# Patient Record
Sex: FEMALE | Race: WHITE | NOT HISPANIC OR LATINO
[De-identification: names, ages, dates, MRNs, and addresses within clinical notes are randomized per-mention and may not be internally consistent; named-entity substitution may affect disease eponyms.]

---

## 2021-07-08 ENCOUNTER — RX RENEWAL (OUTPATIENT)
Age: 76
End: 2021-07-08

## 2021-09-05 ENCOUNTER — RX RENEWAL (OUTPATIENT)
Age: 76
End: 2021-09-05

## 2021-12-09 ENCOUNTER — RX RENEWAL (OUTPATIENT)
Age: 76
End: 2021-12-09

## 2022-02-14 ENCOUNTER — RX RENEWAL (OUTPATIENT)
Age: 77
End: 2022-02-14

## 2022-04-18 ENCOUNTER — RX RENEWAL (OUTPATIENT)
Age: 77
End: 2022-04-18

## 2022-04-22 ENCOUNTER — RX RENEWAL (OUTPATIENT)
Age: 77
End: 2022-04-22

## 2022-04-27 ENCOUNTER — RX RENEWAL (OUTPATIENT)
Age: 77
End: 2022-04-27

## 2022-05-19 ENCOUNTER — RX RENEWAL (OUTPATIENT)
Age: 77
End: 2022-05-19

## 2022-09-15 ENCOUNTER — NON-APPOINTMENT (OUTPATIENT)
Age: 77
End: 2022-09-15

## 2022-09-15 ENCOUNTER — LABORATORY RESULT (OUTPATIENT)
Age: 77
End: 2022-09-15

## 2022-09-15 ENCOUNTER — APPOINTMENT (OUTPATIENT)
Dept: INTERNAL MEDICINE | Facility: CLINIC | Age: 77
End: 2022-09-15

## 2022-09-15 VITALS
OXYGEN SATURATION: 95 % | SYSTOLIC BLOOD PRESSURE: 150 MMHG | HEART RATE: 71 BPM | BODY MASS INDEX: 18.95 KG/M2 | WEIGHT: 111 LBS | DIASTOLIC BLOOD PRESSURE: 80 MMHG | TEMPERATURE: 97.5 F | HEIGHT: 64 IN | RESPIRATION RATE: 17 BRPM

## 2022-09-15 DIAGNOSIS — Z00.00 ENCOUNTER FOR GENERAL ADULT MEDICAL EXAMINATION W/OUT ABNORMAL FINDINGS: ICD-10-CM

## 2022-09-15 PROCEDURE — G0439: CPT

## 2022-09-15 PROCEDURE — 93000 ELECTROCARDIOGRAM COMPLETE: CPT

## 2022-09-15 PROCEDURE — 36415 COLL VENOUS BLD VENIPUNCTURE: CPT

## 2022-09-15 PROCEDURE — 99214 OFFICE O/P EST MOD 30 MIN: CPT | Mod: 25

## 2022-09-15 RX ORDER — ALBUTEROL SULFATE 90 UG/1
108 (90 BASE) INHALANT RESPIRATORY (INHALATION)
Qty: 1 | Refills: 3 | Status: ACTIVE | COMMUNITY
Start: 2022-09-15 | End: 1900-01-01

## 2022-09-16 ENCOUNTER — TRANSCRIPTION ENCOUNTER (OUTPATIENT)
Age: 77
End: 2022-09-16

## 2022-09-16 LAB
25(OH)D3 SERPL-MCNC: 92.5 NG/ML
ALBUMIN SERPL ELPH-MCNC: 4.3 G/DL
ALP BLD-CCNC: 119 U/L
ALT SERPL-CCNC: 27 U/L
ANION GAP SERPL CALC-SCNC: 10 MMOL/L
APPEARANCE: CLEAR
AST SERPL-CCNC: 36 U/L
BACTERIA: NEGATIVE
BASOPHILS # BLD AUTO: 2.96 K/UL
BASOPHILS NFR BLD AUTO: 8.1 %
BILIRUB SERPL-MCNC: 0.6 MG/DL
BILIRUBIN URINE: NEGATIVE
BLOOD URINE: NEGATIVE
BUN SERPL-MCNC: 17 MG/DL
CALCIUM SERPL-MCNC: 11.1 MG/DL
CHLORIDE SERPL-SCNC: 102 MMOL/L
CHOLEST SERPL-MCNC: 223 MG/DL
CO2 SERPL-SCNC: 33 MMOL/L
COLOR: NORMAL
CREAT SERPL-MCNC: 0.57 MG/DL
EGFR: 95 ML/MIN/1.73M2
EOSINOPHIL # BLD AUTO: 1.2 K/UL
EOSINOPHIL NFR BLD AUTO: 3.3 %
ESTIMATED AVERAGE GLUCOSE: 123 MG/DL
GLUCOSE QUALITATIVE U: NEGATIVE
GLUCOSE SERPL-MCNC: 91 MG/DL
HBA1C MFR BLD HPLC: 5.9 %
HBV SURFACE AG SER QL: NONREACTIVE
HCT VFR BLD CALC: 49.7 %
HCV AB SER QL: NONREACTIVE
HCV S/CO RATIO: 0.09 S/CO
HDLC SERPL-MCNC: 76 MG/DL
HGB BLD-MCNC: 14.9 G/DL
HYALINE CASTS: 0 /LPF
KETONES URINE: NEGATIVE
LDLC SERPL CALC-MCNC: 123 MG/DL
LEUKOCYTE ESTERASE URINE: NEGATIVE
LYMPHOCYTES # BLD AUTO: 4.75 K/UL
LYMPHOCYTES NFR BLD AUTO: 13 %
MAN DIFF?: NORMAL
MCHC RBC-ENTMCNC: 27.9 PG
MCHC RBC-ENTMCNC: 30 GM/DL
MCV RBC AUTO: 92.9 FL
MICROSCOPIC-UA: NORMAL
MONOCYTES # BLD AUTO: 2.08 K/UL
MONOCYTES NFR BLD AUTO: 5.7 %
NEUTROPHILS # BLD AUTO: 21.69 K/UL
NEUTROPHILS NFR BLD AUTO: 59.4 %
NITRITE URINE: NEGATIVE
NONHDLC SERPL-MCNC: 148 MG/DL
PH URINE: 7.5
PLATELET # BLD AUTO: 750 K/UL
POTASSIUM SERPL-SCNC: 4.8 MMOL/L
PROT SERPL-MCNC: 6.7 G/DL
PROTEIN URINE: NEGATIVE
RBC # BLD: 5.35 M/UL
RBC # FLD: 16.5 %
RED BLOOD CELLS URINE: 1 /HPF
SODIUM SERPL-SCNC: 146 MMOL/L
SPECIFIC GRAVITY URINE: 1.01
SQUAMOUS EPITHELIAL CELLS: 0 /HPF
T3 SERPL-MCNC: 133 NG/DL
T4 FREE SERPL-MCNC: 1.2 NG/DL
TRIGL SERPL-MCNC: 123 MG/DL
TSH SERPL-ACNC: 2.11 UIU/ML
UROBILINOGEN URINE: NORMAL
VIT B12 SERPL-MCNC: >2000 PG/ML
WBC # FLD AUTO: 36.51 K/UL
WHITE BLOOD CELLS URINE: 0 /HPF

## 2022-09-18 LAB — BACTERIA UR CULT: NORMAL

## 2022-09-18 NOTE — HISTORY OF PRESENT ILLNESS
[FreeTextEntry1] : monika [de-identified] : pfizer x4\par pneum x2\par copd- not on inhalers. has wixela at home . not smoking\par will see dr Trevizo- cardiol.\par Otherwise feels good. Appet, sleep, bms, voiding, mood all ok. no pain.\par

## 2022-09-18 NOTE — HEALTH RISK ASSESSMENT
[Good] : ~his/her~  mood as  good [Former] : Former [20 or more] : 20 or more [No] : In the past 12 months have you used drugs other than those required for medical reasons? No [No falls in past year] : Patient reported no falls in the past year [0] : 2) Feeling down, depressed, or hopeless: Not at all (0) [PHQ-2 Negative - No further assessment needed] : PHQ-2 Negative - No further assessment needed [None] : None [Alone] : lives alone [Retired] : retired [Single] : single [Feels Safe at Home] : Feels safe at home [Fully functional (bathing, dressing, toileting, transferring, walking, feeding)] : Fully functional (bathing, dressing, toileting, transferring, walking, feeding) [Fully functional (using the telephone, shopping, preparing meals, housekeeping, doing laundry, using] : Fully functional and needs no help or supervision to perform IADLs (using the telephone, shopping, preparing meals, housekeeping, doing laundry, using transportation, managing medications and managing finances) [Audit-CScore] : 0 [de-identified] : active [de-identified] : healthy [[id number]] : 0 [Patient declined mammogram] : Patient declined mammogram [Patient declined PAP Smear] : Patient declined PAP Smear [Patient declined bone density test] : Patient declined bone density test [Patient declined colonoscopy] : Patient declined colonoscopy [Change in mental status noted] : No change in mental status noted [Language] : denies difficulty with language [Behavior] : denies difficulty with behavior [Handling Complex Tasks] : denies difficulty handling complex tasks [Reasoning] : denies difficulty with reasoning [Reports changes in hearing] : Reports no changes in hearing [Reports changes in vision] : Reports no changes in vision [Reports changes in dental health] : Reports no changes in dental health [MammogramDate] : yrs [PapSmearDate] : yrs [BoneDensityDate] : yrs [ColonoscopyDate] : 2016 [ColonoscopyComments] : cologuard [AdvancecareDate] : 9/15/22

## 2022-09-18 NOTE — ASSESSMENT
[FreeTextEntry1] : COPD- quit tob 2022, alb/wixela prn\par Hoarse- ent eval\par HTN- stable, cont amlo, atenolol, valsartan hct\par HLD- d/e, chk labs\par Discussed healthy lifestyle, updated vaccinations, healthy diet, exercise, chk labs, discussed appropriate screening tests including colonoscopy, mammo, bone density and pap.\par Discussed the importance of screening for colon cancer. Reviewed screening reccomendations including colonoscopy, Cologuard and Fit DNA testing. I strongly encouraged colonoscopy as that is the best screening test to detect both colon cancer and polyps and is the gold standard.\par Discussed the importance and benefit of a healthy lifestyle including a heart healthy diet such as the Mediterranean diet and regular exercise to try to lose weight as well as 7 hours of sleep nightly and minimization of stress where possible as a way of decreasing heart risk and improving health\par \par

## 2022-09-21 ENCOUNTER — OUTPATIENT (OUTPATIENT)
Dept: OUTPATIENT SERVICES | Facility: HOSPITAL | Age: 77
LOS: 1 days | Discharge: HOME | End: 2022-09-21

## 2022-09-21 ENCOUNTER — TRANSCRIPTION ENCOUNTER (OUTPATIENT)
Age: 77
End: 2022-09-21

## 2022-09-21 DIAGNOSIS — Z01.812 ENCOUNTER FOR PREPROCEDURAL LABORATORY EXAMINATION: ICD-10-CM

## 2022-09-22 ENCOUNTER — RESULT REVIEW (OUTPATIENT)
Age: 77
End: 2022-09-22

## 2022-09-22 ENCOUNTER — TRANSCRIPTION ENCOUNTER (OUTPATIENT)
Age: 77
End: 2022-09-22

## 2022-09-22 ENCOUNTER — APPOINTMENT (OUTPATIENT)
Dept: HEMATOLOGY ONCOLOGY | Facility: CLINIC | Age: 77
End: 2022-09-22

## 2022-09-22 ENCOUNTER — OUTPATIENT (OUTPATIENT)
Dept: OUTPATIENT SERVICES | Facility: HOSPITAL | Age: 77
LOS: 1 days | End: 2022-09-22
Payer: MEDICARE

## 2022-09-22 ENCOUNTER — NON-APPOINTMENT (OUTPATIENT)
Age: 77
End: 2022-09-22

## 2022-09-22 ENCOUNTER — APPOINTMENT (OUTPATIENT)
Dept: RADIOLOGY | Facility: IMAGING CENTER | Age: 77
End: 2022-09-22

## 2022-09-22 ENCOUNTER — LABORATORY RESULT (OUTPATIENT)
Age: 77
End: 2022-09-22

## 2022-09-22 VITALS
WEIGHT: 113.98 LBS | HEIGHT: 62.99 IN | DIASTOLIC BLOOD PRESSURE: 68 MMHG | OXYGEN SATURATION: 97 % | SYSTOLIC BLOOD PRESSURE: 161 MMHG | TEMPERATURE: 98.2 F | RESPIRATION RATE: 16 BRPM | BODY MASS INDEX: 20.2 KG/M2 | HEART RATE: 68 BPM

## 2022-09-22 DIAGNOSIS — Z00.8 ENCOUNTER FOR OTHER GENERAL EXAMINATION: ICD-10-CM

## 2022-09-22 DIAGNOSIS — Z01.812 ENCOUNTER FOR PREPROCEDURAL LABORATORY EXAMINATION: ICD-10-CM

## 2022-09-22 DIAGNOSIS — R05.9 COUGH, UNSPECIFIED: ICD-10-CM

## 2022-09-22 LAB
BASOPHILS # BLD AUTO: 0.66 K/UL — HIGH (ref 0–0.2)
BASOPHILS NFR BLD AUTO: 2 % — SIGNIFICANT CHANGE UP (ref 0–2)
EOSINOPHIL # BLD AUTO: 0.66 K/UL — HIGH (ref 0–0.5)
EOSINOPHIL NFR BLD AUTO: 2 % — SIGNIFICANT CHANGE UP (ref 0–6)
HCT VFR BLD CALC: 44.1 % — SIGNIFICANT CHANGE UP (ref 34.5–45)
HGB BLD-MCNC: 13.9 G/DL — SIGNIFICANT CHANGE UP (ref 11.5–15.5)
LYMPHOCYTES # BLD AUTO: 10 % — LOW (ref 13–44)
LYMPHOCYTES # BLD AUTO: 3.31 K/UL — HIGH (ref 1–3.3)
MCHC RBC-ENTMCNC: 27.7 PG — SIGNIFICANT CHANGE UP (ref 27–34)
MCHC RBC-ENTMCNC: 31.5 G/DL — LOW (ref 32–36)
MCV RBC AUTO: 87.8 FL — SIGNIFICANT CHANGE UP (ref 80–100)
METAMYELOCYTES # FLD: 4 % — HIGH (ref 0–0)
MONOCYTES # BLD AUTO: 1.33 K/UL — HIGH (ref 0–0.9)
MONOCYTES NFR BLD AUTO: 4 % — SIGNIFICANT CHANGE UP (ref 2–14)
MYELOCYTES NFR BLD: 11 % — HIGH (ref 0–0)
NEUTROPHILS # BLD AUTO: 22.2 K/UL — HIGH (ref 1.8–7.4)
NEUTROPHILS NFR BLD AUTO: 67 % — SIGNIFICANT CHANGE UP (ref 43–77)
NRBC # BLD: 0 /100 — SIGNIFICANT CHANGE UP (ref 0–0)
NRBC # BLD: SIGNIFICANT CHANGE UP /100 WBCS (ref 0–0)
PLAT MORPH BLD: NORMAL — SIGNIFICANT CHANGE UP
PLATELET # BLD AUTO: 799 K/UL — HIGH (ref 150–400)
RBC # BLD: 5.02 M/UL — SIGNIFICANT CHANGE UP (ref 3.8–5.2)
RBC # FLD: 15.9 % — HIGH (ref 10.3–14.5)
RBC BLD AUTO: SIGNIFICANT CHANGE UP
RETICS #: 108.9 K/UL — SIGNIFICANT CHANGE UP (ref 25–125)
RETICS/RBC NFR: 2.2 % — SIGNIFICANT CHANGE UP (ref 0.5–2.5)
WBC # BLD: 33.13 K/UL — HIGH (ref 3.8–10.5)
WBC # FLD AUTO: 33.13 K/UL — HIGH (ref 3.8–10.5)

## 2022-09-22 PROCEDURE — 71046 X-RAY EXAM CHEST 2 VIEWS: CPT | Mod: 26

## 2022-09-22 PROCEDURE — 99215 OFFICE O/P EST HI 40 MIN: CPT

## 2022-09-22 PROCEDURE — 86900 BLOOD TYPING SEROLOGIC ABO: CPT

## 2022-09-22 PROCEDURE — 86901 BLOOD TYPING SEROLOGIC RH(D): CPT

## 2022-09-22 PROCEDURE — 71046 X-RAY EXAM CHEST 2 VIEWS: CPT

## 2022-09-22 PROCEDURE — 86850 RBC ANTIBODY SCREEN: CPT

## 2022-09-23 ENCOUNTER — NON-APPOINTMENT (OUTPATIENT)
Age: 77
End: 2022-09-23

## 2022-09-26 NOTE — HISTORY OF PRESENT ILLNESS
[de-identified] : This is a 76 year old woman, hx of smoking and alcohol use, COPD.  Patient had a checkup in May 2021.  [place] [facility] [place]. Was said to have normal Bloodwork.   She did have aa HCT of 52k/ul but normal WBC and diff platelets normal\par \par Now WBC 3.6 with immature granulocytes metamyelocytes, no blasts basophils 81% Plates 750 Hg preserved at 14.9 now. \par \par Fatigue, sweats at nights though patient denies this Babita daughter who is a nurse noted this for months.  Quit smoking 2 months ago. \par Did lose weight over the past year.  \par GI tract is well, normal bowel movements.   Appetite adequate.\par \par Daughter Ariana diagnosed in February with breast caner, patient stopped drinking following this.

## 2022-09-26 NOTE — ASSESSMENT
[FreeTextEntry1] : This is a 76 year old woman, history alcohol use, ex smoker, presents for the evaluation of a critically high WBC count 36,000 with promyelocytes and myelocytes in peripheral peripheral blood. Platelet cont 730, underlying defect from 3/2021 was a polycythemia HCT 52%.  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  Can also be myelofibrosis or CMML.  Repeat CBC, aforementioned leukocytosis would have patient called back for bone marrow biopsy for complete diagnosis. Explained to patient and daughter Trica that this is a readily treatable though incurable chronic leukemia. that responds well to oral tyrosine kinases inhibitors. While this is serious it is not an immanently life threatening findings. Explained to patient  that there is a very very small probability that the CBC is a lab error from label swapping.  \par \par \par Given smoking history, dry cough and weight loss,  no recent imaging, also recommend a baseline Chest Xray.  \par \par Peripheral smear reviewed 9/23/22\par RBC normocytic normochromic normal morphology. Platelets elevated in number, normal granulations and morphology, no clumping.  WBC show some immature cells promyelocytes metamyelocytes and myelocytes 7%, 8% basophils 2% monocytes, 18% lymphocytes, 65% mature segmented neutrophils.  \par \par CBC still shows 790 platelets and 33k/ul WBC, confirmed MPN on peripheral smear, WIll have patient  called back re: bone marrow biopsy.  \par \par Addendum 9/26/22\par Chest Xray\par \par Workup for leukocytosis has not returned, but Friday's Plain Xray PA/Lat demonstrated a 3-4 CM right hilar mass. Will have patient go for CT scan IV contrast then see Thoracic surgery for evaluation.  Explained to patient's daughter Babita that the Bone marrow is still necessary, as we would need to clear patient for surgery once the CT scan is done, but the lung tumor is now the more immanent medical priority.

## 2022-09-27 ENCOUNTER — NON-APPOINTMENT (OUTPATIENT)
Age: 77
End: 2022-09-27

## 2022-09-28 ENCOUNTER — APPOINTMENT (OUTPATIENT)
Dept: CT IMAGING | Facility: IMAGING CENTER | Age: 77
End: 2022-09-28

## 2022-09-28 ENCOUNTER — OUTPATIENT (OUTPATIENT)
Dept: OUTPATIENT SERVICES | Facility: HOSPITAL | Age: 77
LOS: 1 days | End: 2022-09-28
Payer: MEDICARE

## 2022-09-28 DIAGNOSIS — R91.8 OTHER NONSPECIFIC ABNORMAL FINDING OF LUNG FIELD: ICD-10-CM

## 2022-09-28 PROCEDURE — 71260 CT THORAX DX C+: CPT | Mod: 26

## 2022-09-28 PROCEDURE — 71260 CT THORAX DX C+: CPT

## 2022-09-29 ENCOUNTER — APPOINTMENT (OUTPATIENT)
Dept: HEMATOLOGY ONCOLOGY | Facility: CLINIC | Age: 77
End: 2022-09-29

## 2022-09-29 ENCOUNTER — LABORATORY RESULT (OUTPATIENT)
Age: 77
End: 2022-09-29

## 2022-09-29 ENCOUNTER — RESULT REVIEW (OUTPATIENT)
Age: 77
End: 2022-09-29

## 2022-09-29 VITALS
RESPIRATION RATE: 16 BRPM | DIASTOLIC BLOOD PRESSURE: 64 MMHG | TEMPERATURE: 98.1 F | HEART RATE: 61 BPM | BODY MASS INDEX: 15.94 KG/M2 | OXYGEN SATURATION: 94 % | SYSTOLIC BLOOD PRESSURE: 132 MMHG | WEIGHT: 89.95 LBS

## 2022-09-29 LAB
BASOPHILS # BLD AUTO: 0.85 K/UL — HIGH (ref 0–0.2)
BASOPHILS NFR BLD AUTO: 3 % — HIGH (ref 0–2)
EOSINOPHIL # BLD AUTO: 0.85 K/UL — HIGH (ref 0–0.5)
EOSINOPHIL NFR BLD AUTO: 3 % — SIGNIFICANT CHANGE UP (ref 0–6)
HCT VFR BLD CALC: 41.6 % — SIGNIFICANT CHANGE UP (ref 34.5–45)
HGB BLD-MCNC: 13.7 G/DL — SIGNIFICANT CHANGE UP (ref 11.5–15.5)
LYMPHOCYTES # BLD AUTO: 1.99 K/UL — SIGNIFICANT CHANGE UP (ref 1–3.3)
LYMPHOCYTES # BLD AUTO: 7 % — LOW (ref 13–44)
MCHC RBC-ENTMCNC: 28.1 PG — SIGNIFICANT CHANGE UP (ref 27–34)
MCHC RBC-ENTMCNC: 32.9 G/DL — SIGNIFICANT CHANGE UP (ref 32–36)
MCV RBC AUTO: 85.2 FL — SIGNIFICANT CHANGE UP (ref 80–100)
METAMYELOCYTES # FLD: 4 % — HIGH (ref 0–0)
MONOCYTES # BLD AUTO: 0.28 K/UL — SIGNIFICANT CHANGE UP (ref 0–0.9)
MONOCYTES NFR BLD AUTO: 1 % — LOW (ref 2–14)
MYELOCYTES NFR BLD: 14 % — HIGH (ref 0–0)
NEUTROPHILS # BLD AUTO: 19.35 K/UL — HIGH (ref 1.8–7.4)
NEUTROPHILS NFR BLD AUTO: 68 % — SIGNIFICANT CHANGE UP (ref 43–77)
NRBC # BLD: 0 /100 — SIGNIFICANT CHANGE UP (ref 0–0)
NRBC # BLD: SIGNIFICANT CHANGE UP /100 WBCS (ref 0–0)
PLAT MORPH BLD: NORMAL — SIGNIFICANT CHANGE UP
PLATELET # BLD AUTO: 742 K/UL — HIGH (ref 150–400)
RBC # BLD: 4.88 M/UL — SIGNIFICANT CHANGE UP (ref 3.8–5.2)
RBC # FLD: 15.7 % — HIGH (ref 10.3–14.5)
RBC BLD AUTO: SIGNIFICANT CHANGE UP
WBC # BLD: 28.46 K/UL — HIGH (ref 3.8–10.5)
WBC # FLD AUTO: 28.46 K/UL — HIGH (ref 3.8–10.5)

## 2022-09-29 PROCEDURE — 38222 DX BONE MARROW BX & ASPIR: CPT

## 2022-09-29 NOTE — PROCEDURE
[Bone Marrow Biopsy] : bone marrow biopsy [Bone Marrow Aspiration] : bone marrow aspiration  [Patient] : the patient [Verbal Consent Obtained] : verbal consent was obtained prior to the procedure [Patient identification verified] : patient identification verified [Procedure verified and consent obtained] : procedure verified and consent obtained [Laterality verified and correct site marked] : laterality verified and correct site marked [Right] : site: right [Correct positioning] : correct positioning [Prone] : prone [Superior iliac spine was identified] : the superior iliac spine was identified. [The right posterior iliac crest was prepped with betadine and draped, using sterile technique.] : The right posterior iliac crest was prepped with betadine and draped, using sterile technique. [Lidocaine was injected and into the periosteum overlying the site.] : Lidocaine was injected and into the periosteum overlying the site. [Aspirate] : aspirate [Cytogenetics] : cytogenetics [FISH] : FISH [Biopsy] : biopsy [Flow Cytometry] : flow cytometry [] : The patient was instructed to remove the bandage the following AM. The patient may bathe. Acetaminophen may be taken for discomfort, as per package directions.If there are any other problems, the patient was instructed to call the office. The patient verbalized understanding, and is aware of the office contact numbers. [FreeTextEntry1] : leukocytosis/ thrombocytosis [FreeTextEntry2] : 6 cc of lidocaine was used for the procedure. \par \par WBC: 28.46\par Hgb: 13.7\par Hct: 41.6\par Plts: 742\par \par Bone marrow aspiration and biopsy were done. MPN and CML panel requested.

## 2022-09-29 NOTE — REASON FOR VISIT
[Bone Marrow Biopsy] : bone marrow biopsy [Bone Marrow Aspiration] : bone marrow aspiration [FreeTextEntry2] : leukocytosis/ thrombocytosis

## 2022-10-03 ENCOUNTER — APPOINTMENT (OUTPATIENT)
Dept: HEMATOLOGY ONCOLOGY | Facility: CLINIC | Age: 77
End: 2022-10-03

## 2022-10-03 ENCOUNTER — APPOINTMENT (OUTPATIENT)
Dept: THORACIC SURGERY | Facility: CLINIC | Age: 77
End: 2022-10-03

## 2022-10-03 ENCOUNTER — RESULT REVIEW (OUTPATIENT)
Age: 77
End: 2022-10-03

## 2022-10-03 ENCOUNTER — NON-APPOINTMENT (OUTPATIENT)
Age: 77
End: 2022-10-03

## 2022-10-03 VITALS
BODY MASS INDEX: 20.61 KG/M2 | WEIGHT: 112 LBS | OXYGEN SATURATION: 93 % | SYSTOLIC BLOOD PRESSURE: 136 MMHG | DIASTOLIC BLOOD PRESSURE: 74 MMHG | HEIGHT: 62 IN | HEART RATE: 59 BPM

## 2022-10-03 DIAGNOSIS — Z78.9 OTHER SPECIFIED HEALTH STATUS: ICD-10-CM

## 2022-10-03 DIAGNOSIS — Z77.22 CONTACT WITH AND (SUSPECTED) EXPOSURE TO ENVIRONMENTAL TOBACCO SMOKE (ACUTE) (CHRONIC): ICD-10-CM

## 2022-10-03 DIAGNOSIS — Z87.891 PERSONAL HISTORY OF NICOTINE DEPENDENCE: ICD-10-CM

## 2022-10-03 LAB
BASOPHILS # BLD AUTO: 0.72 K/UL — HIGH (ref 0–0.2)
BASOPHILS NFR BLD AUTO: 2 % — SIGNIFICANT CHANGE UP (ref 0–2)
EOSINOPHIL # BLD AUTO: 0.36 K/UL — SIGNIFICANT CHANGE UP (ref 0–0.5)
EOSINOPHIL NFR BLD AUTO: 1 % — SIGNIFICANT CHANGE UP (ref 0–6)
HCT VFR BLD CALC: 41.2 % — SIGNIFICANT CHANGE UP (ref 34.5–45)
HGB BLD-MCNC: 13.4 G/DL — SIGNIFICANT CHANGE UP (ref 11.5–15.5)
LYMPHOCYTES # BLD AUTO: 13 % — SIGNIFICANT CHANGE UP (ref 13–44)
LYMPHOCYTES # BLD AUTO: 4.68 K/UL — HIGH (ref 1–3.3)
MCHC RBC-ENTMCNC: 28 PG — SIGNIFICANT CHANGE UP (ref 27–34)
MCHC RBC-ENTMCNC: 32.5 G/DL — SIGNIFICANT CHANGE UP (ref 32–36)
MCV RBC AUTO: 86 FL — SIGNIFICANT CHANGE UP (ref 80–100)
METAMYELOCYTES # FLD: 5 % — HIGH (ref 0–0)
MONOCYTES # BLD AUTO: 0 K/UL — SIGNIFICANT CHANGE UP (ref 0–0.9)
MONOCYTES NFR BLD AUTO: 0 % — LOW (ref 2–14)
MYELOCYTES NFR BLD: 10 % — HIGH (ref 0–0)
NEUTROPHILS # BLD AUTO: 24.84 K/UL — HIGH (ref 1.8–7.4)
NEUTROPHILS NFR BLD AUTO: 69 % — SIGNIFICANT CHANGE UP (ref 43–77)
NRBC # BLD: 0 /100 — SIGNIFICANT CHANGE UP (ref 0–0)
NRBC # BLD: SIGNIFICANT CHANGE UP /100 WBCS (ref 0–0)
PLAT MORPH BLD: NORMAL — SIGNIFICANT CHANGE UP
PLATELET # BLD AUTO: 870 K/UL — HIGH (ref 150–400)
RBC # BLD: 4.79 M/UL — SIGNIFICANT CHANGE UP (ref 3.8–5.2)
RBC # FLD: 15.8 % — HIGH (ref 10.3–14.5)
RBC BLD AUTO: SIGNIFICANT CHANGE UP
WBC # BLD: 36 K/UL — HIGH (ref 3.8–10.5)
WBC # FLD AUTO: 36 K/UL — HIGH (ref 3.8–10.5)

## 2022-10-03 PROCEDURE — 99205 OFFICE O/P NEW HI 60 MIN: CPT

## 2022-10-03 NOTE — DATA REVIEWED
[FreeTextEntry1] : I have independently reviewed the following:\par CXR on 9/22/22\par CT Chest w/IV contrast on 9/28/22

## 2022-10-03 NOTE — HISTORY OF PRESENT ILLNESS
[FreeTextEntry1] : Ms. KALA VALLEJO, 76 year old female, former smoker, w/ hx of HTN, COPD, leukocytosis, who presented to PCP for routine physical check, was found to have elevated WBC. Pt was then sent to Hem/Onc Dr. Courtney Pandya for further workup. Now s/p bone marrow biopsy, pending result. Pt was also instructed to do a CXR, which showed a lung mass incidentally. \par \par CXR on 9/22/22:\par - 3 and 4 cm Rt hilar masses concerning for neoplasm\par \par CT Chest w/IV contrast on 9/28/22:\par - emphysema\par - mild biapical scarring\par - tree-in-bud opacities with anterior RUL, likely impacted airways\par - 6.4 cm partially calcified  Rt hilar mass within RUL/superior segment of RLL\par - pancreatic duct and CBD are mildly dilated\par \par Pt presents today for CT Sx consultation, referred by Dr. Courtney Pandya. \par \par \par \par

## 2022-10-03 NOTE — ASSESSMENT
[FreeTextEntry1] : Ms. KALA VALLEJO, 76 year old female, former smoker, w/ hx of HTN, COPD, leukocytosis, who presented to PCP for routine physical check, was found to have elevated WBC. Pt was then sent to Hem/Onc Dr. Courtney Pandya for further workup. Now s/p bone marrow biopsy, pending result. Pt was also instructed to do a CXR, which showed a lung mass incidentally. \par \par CXR on 9/22/22:\par - 3 and 4 cm Rt hilar masses concerning for neoplasm\par \par CT Chest w/IV contrast on 9/28/22:\par - emphysema\par - mild biapical scarring\par - tree-in-bud opacities with anterior RUL, likely impacted airways\par - 6.4 cm partially calcified  Rt hilar mass within RUL/superior segment of RLL\par - pancreatic duct and CBD are mildly dilated\par \par I have reviewed the patient's medical records and diagnostic images at time of this office consultation and have made the following recommendation:\par 1. CT reviewed with pt, would like to order a PET/CT for further evaluation of the lung mass. \par 2. BW for Quantiferon TB, Fungitell, Aspergillus, and ACE\par 3. RTC after all above tests\par \par \par I personally performed the services described in the documentation, reviewed the documentation recorded by the scribe in my presence and it accurately and completely records my words and actions. \par \par I, Dannielle Will NP, am scribing for and the presence of TRACEY Stanley, the following sections HISTORY OF PRESENT ILLNESS, PAST MEDICAL/FAMILY/SOCIAL HISTORY; REVIEW OF SYSTEMS; VITAL SIGNS; PHYSICAL EXAM; DISPOSITION.\par

## 2022-10-03 NOTE — CONSULT LETTER
[Dear  ___] : Dear  [unfilled], [Consult Letter:] : I had the pleasure of evaluating your patient, [unfilled]. [( Thank you for referring [unfilled] for consultation for _____ )] : Thank you for referring [unfilled] for consultation for [unfilled] [Please see my note below.] : Please see my note below. [Consult Closing:] : Thank you very much for allowing me to participate in the care of this patient.  If you have any questions, please do not hesitate to contact me. [Sincerely,] : Sincerely, [FreeTextEntry2] : Dr. Courtney Pandya (ref/Hem/Onc) [FreeTextEntry3] : Meaghan Cortes MD\par Attending Surgeon\par Division of Thoracic Surgery\par , [employer] at [facility]\par

## 2022-10-04 LAB
ACE BLD-CCNC: 109 U/L
M TB IFN-G BLD-IMP: NEGATIVE
QUANTIFERON TB PLUS MITOGEN MINUS NIL: 2.49 IU/ML
QUANTIFERON TB PLUS NIL: 0.02 IU/ML
QUANTIFERON TB PLUS TB1 MINUS NIL: -0.01 IU/ML
QUANTIFERON TB PLUS TB2 MINUS NIL: -0.01 IU/ML

## 2022-10-05 ENCOUNTER — APPOINTMENT (OUTPATIENT)
Dept: HEMATOLOGY ONCOLOGY | Facility: CLINIC | Age: 77
End: 2022-10-05

## 2022-10-05 ENCOUNTER — NON-APPOINTMENT (OUTPATIENT)
Age: 77
End: 2022-10-05

## 2022-10-05 LAB
ALBUMIN SERPL ELPH-MCNC: 4.5 G/DL
ALP BLD-CCNC: 111 U/L
ALT SERPL-CCNC: 26 U/L
ANION GAP SERPL CALC-SCNC: 10 MMOL/L
APTT BLD: 29.6 SEC
AST SERPL-CCNC: 30 U/L
BILIRUB SERPL-MCNC: 0.6 MG/DL
BUN SERPL-MCNC: 21 MG/DL
CALCIUM SERPL-MCNC: 10.9 MG/DL
CHLORIDE SERPL-SCNC: 98 MMOL/L
CO2 SERPL-SCNC: 33 MMOL/L
CREAT SERPL-MCNC: 0.55 MG/DL
EGFR: 96 ML/MIN/1.73M2
EXTRACTION: NORMAL
FUNGITELL QUANTITATIVE VALUE: <31 PG/ML
GENE XXX MUT ANL BLD/T: NORMAL
GLUCOSE SERPL-MCNC: 95 MG/DL
INR PPP: 1.03 RATIO
JAK2 P.V617F BLD/T QL: NORMAL
LAB DIRECTOR NAME PROVIDER: NORMAL
LABORATORY COMMENT REPORT: NORMAL
POTASSIUM SERPL-SCNC: 4.3 MMOL/L
PROT SERPL-MCNC: 6.6 G/DL
PT BLD: 11.9 SEC
REFLEX:: NORMAL
SODIUM SERPL-SCNC: 141 MMOL/L
T(9;22)(ABL1,BCR)/CONTROL BLD/T: NORMAL
VWF AG PPP IA-ACNC: 192 %
VWF:RCO ACT/NOR PPP PL AGG: 104 %

## 2022-10-05 PROCEDURE — 99441: CPT

## 2022-10-05 PROCEDURE — 99442: CPT | Mod: 95

## 2022-10-06 ENCOUNTER — RX RENEWAL (OUTPATIENT)
Age: 77
End: 2022-10-06

## 2022-10-06 LAB — GALACTOMANNAN AG SERPL QL IA: 0.04 INDEX

## 2022-10-10 ENCOUNTER — APPOINTMENT (OUTPATIENT)
Dept: NUCLEAR MEDICINE | Facility: IMAGING CENTER | Age: 77
End: 2022-10-10

## 2022-10-10 ENCOUNTER — OUTPATIENT (OUTPATIENT)
Dept: OUTPATIENT SERVICES | Facility: HOSPITAL | Age: 77
LOS: 1 days | End: 2022-10-10
Payer: MEDICARE

## 2022-10-10 DIAGNOSIS — R91.8 OTHER NONSPECIFIC ABNORMAL FINDING OF LUNG FIELD: ICD-10-CM

## 2022-10-10 PROCEDURE — 78815 PET IMAGE W/CT SKULL-THIGH: CPT | Mod: 26,PI

## 2022-10-10 PROCEDURE — 78815 PET IMAGE W/CT SKULL-THIGH: CPT

## 2022-10-10 PROCEDURE — A9552: CPT

## 2022-10-13 ENCOUNTER — APPOINTMENT (OUTPATIENT)
Dept: INTERNAL MEDICINE | Facility: CLINIC | Age: 77
End: 2022-10-13

## 2022-10-17 ENCOUNTER — APPOINTMENT (OUTPATIENT)
Dept: CARDIOLOGY | Facility: CLINIC | Age: 77
End: 2022-10-17

## 2022-10-17 ENCOUNTER — APPOINTMENT (OUTPATIENT)
Dept: THORACIC SURGERY | Facility: CLINIC | Age: 77
End: 2022-10-17

## 2022-10-17 VITALS — SYSTOLIC BLOOD PRESSURE: 160 MMHG | DIASTOLIC BLOOD PRESSURE: 78 MMHG

## 2022-10-17 VITALS
BODY MASS INDEX: 19.25 KG/M2 | OXYGEN SATURATION: 92 % | DIASTOLIC BLOOD PRESSURE: 69 MMHG | WEIGHT: 110 LBS | SYSTOLIC BLOOD PRESSURE: 162 MMHG | HEIGHT: 63.5 IN | RESPIRATION RATE: 16 BRPM | HEART RATE: 63 BPM

## 2022-10-17 DIAGNOSIS — Z86.79 PERSONAL HISTORY OF OTHER DISEASES OF THE CIRCULATORY SYSTEM: ICD-10-CM

## 2022-10-17 PROCEDURE — 93010 ELECTROCARDIOGRAM REPORT: CPT

## 2022-10-17 PROCEDURE — 99205 OFFICE O/P NEW HI 60 MIN: CPT

## 2022-10-17 PROCEDURE — 99215 OFFICE O/P EST HI 40 MIN: CPT

## 2022-10-17 PROCEDURE — 93246 EXT ECG>7D<15D RECORDING: CPT | Mod: 79

## 2022-10-17 PROCEDURE — 93000 ELECTROCARDIOGRAM COMPLETE: CPT | Mod: NC,59

## 2022-10-17 NOTE — HISTORY OF PRESENT ILLNESS
[FreeTextEntry1] : Ms. KALA VALLEJO, 76 year old female, former smoker, w/ hx of HTN, COPD, leukocytosis, who presented to PCP for routine physical check, was found to have elevated WBC. Pt was then sent to Hem/Onc Dr. Courtney Pandya for further workup. Now s/p bone marrow biopsy, pending result. Pt was also instructed to do a CXR, which showed a lung mass incidentally. \par \par CXR on 9/22/22:\par - 3 and 4 cm Rt hilar masses concerning for neoplasm\par \par CT Chest w/IV contrast on 9/28/22:\par - emphysema\par - mild biapical scarring\par - tree-in-bud opacities with anterior RUL, likely impacted airways\par - 6.4 cm partially calcified  Rt hilar mass within RUL/superior segment of RLL\par - pancreatic duct and CBD are mildly dilated\par \par BW for Quantiferon, Fungitell and Aspergillus are negative. ACE level is elevated. \par \par PET/CT on 10/7/22:\par - suboptimally visualized hypodense thyroid nodules with NO ABNORMAL uptake, measuring up to approximately 1.0 cm\par - heterogeneously calcified mass at the RIGHT hilum demonstrates intense uptake, 5.3 x 4.3 cm, SUV 13.5\par - mild uptake seen at peripherally located tree-in-bud nodularity in the anterior RIGHT UPPER lobe, SUV 1.2\par - heterogeneous, moderate to intense uptake seen within the skeleton, predominantly at the spine and pelvic bones, without focally suspicious findings\par \par Pt presents today for follow up. Pt admits to "tasting" blood in the mouth, feeling fatigue, Lt sided abd discomfort and nausea.

## 2022-10-17 NOTE — CONSULT LETTER
[Dear  ___] : Dear  [unfilled], [Consult Letter:] : I had the pleasure of evaluating your patient, [unfilled]. [( Thank you for referring [unfilled] for consultation for _____ )] : Thank you for referring [unfilled] for consultation for [unfilled] [Please see my note below.] : Please see my note below. [Consult Closing:] : Thank you very much for allowing me to participate in the care of this patient.  If you have any questions, please do not hesitate to contact me. [Sincerely,] : Sincerely, [FreeTextEntry2] : Dr. Courtney Pandya (ref/Hem/Onc) [FreeTextEntry3] : Meaghan Cortes MD\par Attending Surgeon\par Division of Thoracic Surgery\par , [facility] [employer] [facility] at \[facility]\""/[facility]\par

## 2022-10-17 NOTE — ASSESSMENT
[FreeTextEntry1] : Ms. KALA VALLEJO, 76 year old female, former smoker, w/ hx of HTN, COPD, leukocytosis, who presented to PCP for routine physical check, was found to have elevated WBC. Pt was then sent to Hem/Onc Dr. Courtney Pandya for further workup. Now s/p bone marrow biopsy, pending result. Pt was also instructed to do a CXR, which showed a lung mass incidentally. \par \par BW for Quantiferon, Fungitell and Aspergillus are negative. ACE level is elevated. \par \par PET/CT on 10/7/22:\par - suboptimally visualized hypodense thyroid nodules with NO ABNORMAL uptake, measuring up to approximately 1.0 cm\par - heterogeneously calcified mass at the RIGHT hilum demonstrates intense uptake, 5.3 x 4.3 cm, SUV 13.5\par - mild uptake seen at peripherally located tree-in-bud nodularity in the anterior RIGHT UPPER lobe, SUV 1.2\par - heterogeneous, moderate to intense uptake seen within the skeleton, predominantly at the spine and pelvic bones, without focally suspicious findings\par \par I have reviewed the patient's medical records and diagnostic images at time of this office consultation and have made the following recommendation:\par 1. PET/CT reviewed with pt, I would like to refer pt to Dr. Lin for navigational bronchoscopy +EBUS biopsy. \par 2. PFTs and V/Q scan\par 3. RTC after all above studies \par \par \par I personally performed the services described in the documentation, reviewed the documentation recorded by the scribe in my presence and it accurately and completely records my words and actions. \par \par I, Dannielle Will, NP, am scribing for and the presence of TRACEY Stanley, the following sections HISTORY OF PRESENT ILLNESS, PAST MEDICAL/FAMILY/SOCIAL HISTORY; REVIEW OF SYSTEMS; VITAL SIGNS; PHYSICAL EXAM; DISPOSITION.\par

## 2022-10-19 NOTE — ASSESSMENT
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 An echocardiogram will be obtained.\par \par On examination there were frequent ectopic beats. A Zio patch monitor will be obtained to further assess this, but need not be completed prior to proceeding with the biopsy.\par \par Optimized to proceed with this low risk procedure from the cardiovascular point of view. She should take all her BP medications as she normally does, including on the AM of the procedure with a small sip of water.\par \par Because of atherosclerotic calcifications and ASCVD risk, aspirin and statin may be prescribed, but will defer doing so until after the biopsy procedure when there is clarity on the plan of care for the CML and lung mass.\par \par All above recommendations were discussed with the patient and her daughter and all questions answered to the best of my ability and their apparent satisfaction.\par

## 2022-10-19 NOTE — HISTORY OF PRESENT ILLNESS
[FreeTextEntry1] : KALA VALLEJO is a 76 year woman smoker with a history of hypertension for many years who was recently found to have CML after routine blood tests done by her PCP. During workup for CML, imaging of the chest showed a 6.4 cm partially calcified right hilar mass in the right upper lobe.\par \par  \par She denies any history of heart disease. Lipid panel and A1c done by PMD in September. Recently quit smoking after 60 years when her partner was diagnosed with breast cancer. She denies any change in exercise tolerance. Able to do >4 METS without difficulty.\par \par Is on atenolol 50, amlodipine, and valsartan/HCTZ for hypertension, but at times BP remains uncontrolled per daughter.\par \par \par Cardiovascular Summary:\par ----------------------------------------------\par ECG:\par 10/14/2022: NSR, possible inferior infarct, possible anterior infarct.\par 9/15/2022: NSR, non-specific IVCD - similar to 10/14/2022\par ----------------------------------------------\par CT/MRI\par 9/28/2022: coronary/aortic calcifications, 6.4 cm right hilar mass as above\par ----------------------------------------------\par \par

## 2022-10-19 NOTE — REASON FOR VISIT
[Family Member] : family member [FreeTextEntry3] : Dr. Hopkins [FreeTextEntry1] : KALA VALLEJO is a 76 year woman with a history of COPD recently diagnosed with CML and lung mass who is referred for cardiac evaluation and clearance for endobronchial biopsy.\par \par \par

## 2022-10-19 NOTE — PHYSICAL EXAM
[Well Developed] : well developed [No Acute Distress] : no acute distress [Normal Conjunctiva] : normal conjunctiva [No Xanthelasma] : no xanthelasma [Normal Venous Pressure] : normal venous pressure [No Carotid Bruit] : no carotid bruit [Normal S1, S2] : normal S1, S2 [No Murmur] : no murmur [No Rub] : no rub [No Gallop] : no gallop [Clear Lung Fields] : clear lung fields [Good Air Entry] : good air entry [No Respiratory Distress] : no respiratory distress  [Soft] : abdomen soft [Normal Gait] : normal gait [Gait - Sufficient for Exercise Testing] : gait - sufficient for exercise testing [No Edema] : no edema [No Cyanosis] : no cyanosis [No Clubbing] : no clubbing [No Varicosities] : no varicosities [No Rash] : no rash [Moves all extremities] : moves all extremities [No Focal Deficits] : no focal deficits [Normal Speech] : normal speech [Alert and Oriented] : alert and oriented [de-identified] : irregular heart beat

## 2022-10-20 ENCOUNTER — NON-APPOINTMENT (OUTPATIENT)
Age: 77
End: 2022-10-20

## 2022-10-20 ENCOUNTER — APPOINTMENT (OUTPATIENT)
Dept: CARDIOLOGY | Facility: CLINIC | Age: 77
End: 2022-10-20

## 2022-10-20 LAB — SARS-COV-2 N GENE NPH QL NAA+PROBE: NOT DETECTED

## 2022-10-20 PROCEDURE — 93306 TTE W/DOPPLER COMPLETE: CPT

## 2022-10-20 PROCEDURE — 93356 MYOCRD STRAIN IMG SPCKL TRCK: CPT

## 2022-10-21 ENCOUNTER — APPOINTMENT (OUTPATIENT)
Dept: PULMONOLOGY | Facility: CLINIC | Age: 77
End: 2022-10-21

## 2022-10-21 ENCOUNTER — LABORATORY RESULT (OUTPATIENT)
Age: 77
End: 2022-10-21

## 2022-10-21 VITALS
HEIGHT: 61 IN | TEMPERATURE: 96.8 F | BODY MASS INDEX: 20.58 KG/M2 | HEART RATE: 60 BPM | WEIGHT: 109 LBS | OXYGEN SATURATION: 96 % | SYSTOLIC BLOOD PRESSURE: 122 MMHG | DIASTOLIC BLOOD PRESSURE: 57 MMHG

## 2022-10-21 PROCEDURE — 94010 BREATHING CAPACITY TEST: CPT

## 2022-10-21 PROCEDURE — 99205 OFFICE O/P NEW HI 60 MIN: CPT | Mod: 25

## 2022-10-21 PROCEDURE — 94726 PLETHYSMOGRAPHY LUNG VOLUMES: CPT

## 2022-10-21 PROCEDURE — 94729 DIFFUSING CAPACITY: CPT

## 2022-10-21 PROCEDURE — ZZZZZ: CPT

## 2022-10-21 NOTE — ASSESSMENT
[FreeTextEntry1] : This is a 77 y/o female recently diagnosed with CML and CML workup revealed large, calcified right sided hilar mass, who presents today with daughter for IP evaluation for right sided lung mass. The differential diagnosis of the mass based on location, history and appearance including malignancy, infectious etiology, inflammatory etiology and other etiologies was discussed\par \par Procedure to be done:\par [place] robotic assisted navigation bronchoscopy, EBUS, Flexible Bronch, Lung biopsy, Lymph node biopsy and cytology. It will be done with fluoroscopy. \par . \par The diagnostic yield of robotic assisted navigation assisted bronchoscopy with biopsy as well as EBUS with biopsy was discussed with the patient. The risk and benefits of bronchoscopy with navigation assisted transbronchial biopsy including risk of bleeding and  risk pneumothorax was discussed with the patient and they demonstrated understanding. In case of pneumothorax, we discussed the need for in hospital monitoring and chest tube placement. In case of bleeding, we explained that they may require inpatient or ICU admission. In case the lesion is suspicious for malignancy on preliminary or there is mediastinal or hilar adenopathy, the patient will need staging of the mediastinum with EBUS guided TBNA in the same procedure. The risk and benefits of EBUS including the risk of bleeding and risk of pneumothorax associated with EBUS was discussed with the patient and he demonstrated understanding. We will also send the tissue/BAL for culture to assess for infectious etiology during the procedure. The patient is agreeable to proceed with a navigation assisted bronchoscopy with biopsy of the lung lesion and EBUS with TBNA. The patient will undergo PST to assess candidacy of general anesthesia as well as discuss the risks and benefits with the anesthesiologist. Educational reading material regarding the procedure, risks and benefits provided to the patient in paper format. \par \par Pt advised she will need the following tests done prior to IP procedure:\par CBC with diff, BMP, PT/INR, PTT, A1c and COVID swab\par \par Office staff will help coordinate scheduling all PST labs. Cardiology/medical clearance pre-op\par \par For patient's dry cough and COPD symptoms, will start her on (LABA/ICS) Breo Ellipta. Referring patient to either Dr. Benavides or Dr. Lisker for copd/pulmonology management. \par  \par \par \par \par \par

## 2022-10-21 NOTE — HISTORY OF PRESENT ILLNESS
[TextBox_4] : Interventional Pulmonology Consultation/Visit Note\par \par 76 year old female with pmhx of former smoker (quit 3 months ago), w/ hx of long-standing HTN, COPD, recently found to have CML. CML workup showed presence of a large calcified right hilar mass. Pt presents for IP evaluation. Referred by Thoracic Surgeon, Dr. Cortes\par \par CT chest w/ contrast 9/28/22:\par - 6.4 x 4.7 x 5.4 cm calcified hilar mass \par - Emphysema\par \par Chest XR 9/22/22:\par - large right hilar mass 3cm and 4cm concerning for neoplasm\par - no pleural effusion and no pnemothorax \par \par Pt reports having a dry cough. Using albuterol but it is not fully helping. Has dyspnea on exertion. Voice has improved since utilizing albuterol.

## 2022-10-21 NOTE — REVIEW OF SYSTEMS
[Cough] : cough [Negative] : Endocrine [Dyspnea] : dyspnea [SOB on Exertion] : sob on exertion [Hemoptysis] : no hemoptysis [Sputum] : no sputum [Edema] : no edema [Headache] : no headache

## 2022-10-21 NOTE — DISCUSSION/SUMMARY
[FreeTextEntry1] : The patients most recent CT scan was reviewed by my independently and my interpretation is stated below:\par \par Right paratracheal/parahilar calcified mass

## 2022-10-21 NOTE — CONSULT LETTER
[Dear  ___] : Dear  [unfilled], [Consult Letter:] : I had the pleasure of evaluating your patient, [unfilled]. [Please see my note below.] : Please see my note below. [Consult Closing:] : Thank you very much for allowing me to participate in the care of this patient.  If you have any questions, please do not hesitate to contact me. [Sincerely,] : Sincerely, [FreeTextEntry3] : Bob Lin MD\par Director of Interventional Pulmonology & Bronchoscopy\par . \par Division of Pulmonary, Critical Care & Sleep Medicine\par [facility] at [facility], [place].\par \par

## 2022-10-21 NOTE — PHYSICAL EXAM
[No Acute Distress] : no acute distress [No Deformities] : no deformities [Normal Oropharynx] : normal oropharynx [Normal Appearance] : normal appearance [Normal Rate/Rhythm] : normal rate/rhythm [Normal S1, S2] : normal s1, s2 [No Resp Distress] : no resp distress [No Acc Muscle Use] : no acc muscle use [Normal Palpation] : normal palpation [Normal Rhythm and Effort] : normal rhythm and effort [Clear to Auscultation Bilaterally] : clear to auscultation bilaterally [No Abnormalities] : no abnormalities [Benign] : benign [Normal Gait] : normal gait [No Clubbing] : no clubbing [No Edema] : no edema [Normal Color/ Pigmentation] : normal color/ pigmentation [No Focal Deficits] : no focal deficits [Oriented x3] : oriented x3 [Normal Mood] : normal mood [Normal Affect] : normal affect

## 2022-10-21 NOTE — REASON FOR VISIT
[Cough] : cough [Emphysema] : emphysema [Family Member] : family member [Consultation] : a consultation [TextBox_44] : lung nodule [TextBox_13] : Dr. Cortes

## 2022-10-25 ENCOUNTER — OUTPATIENT (OUTPATIENT)
Dept: OUTPATIENT SERVICES | Facility: HOSPITAL | Age: 77
LOS: 1 days | End: 2022-10-25

## 2022-10-25 ENCOUNTER — APPOINTMENT (OUTPATIENT)
Dept: NUCLEAR MEDICINE | Facility: HOSPITAL | Age: 77
End: 2022-10-25

## 2022-10-25 VITALS
HEIGHT: 62 IN | DIASTOLIC BLOOD PRESSURE: 65 MMHG | TEMPERATURE: 99 F | SYSTOLIC BLOOD PRESSURE: 133 MMHG | OXYGEN SATURATION: 96 % | WEIGHT: 110.01 LBS | RESPIRATION RATE: 14 BRPM | HEART RATE: 55 BPM

## 2022-10-25 DIAGNOSIS — Z90.710 ACQUIRED ABSENCE OF BOTH CERVIX AND UTERUS: Chronic | ICD-10-CM

## 2022-10-25 DIAGNOSIS — L05.91 PILONIDAL CYST WITHOUT ABSCESS: Chronic | ICD-10-CM

## 2022-10-25 DIAGNOSIS — R91.1 SOLITARY PULMONARY NODULE: ICD-10-CM

## 2022-10-25 DIAGNOSIS — R91.8 OTHER NONSPECIFIC ABNORMAL FINDING OF LUNG FIELD: ICD-10-CM

## 2022-10-25 PROCEDURE — 78597 LUNG PERFUSION DIFFERENTIAL: CPT | Mod: 26

## 2022-10-25 RX ORDER — SODIUM CHLORIDE 9 MG/ML
1000 INJECTION, SOLUTION INTRAVENOUS
Refills: 0 | Status: DISCONTINUED | OUTPATIENT
Start: 2022-10-27 | End: 2022-11-10

## 2022-10-25 NOTE — H&P PST ADULT - NSANTHOSAYNRD_GEN_A_CORE
No. ARISTEO screening performed.  STOP BANG Legend: 0-2 = LOW Risk; 3-4 = INTERMEDIATE Risk; 5-8 = HIGH Risk

## 2022-10-25 NOTE — H&P PST ADULT - RESPIRATORY AND THORAX COMMENTS
right lung calcified mass, hx of sob for for the past 3 yrs, feels symptoms have improved since have been taking inhaler and stopped smoking

## 2022-10-25 NOTE — H&P PST ADULT - PROBLEM SELECTOR PLAN 1
Pt scheduled for monarch robotic fariha endobronchial US bronchoscopy/ radial endobronchial US bronchoscopy/ linear with fluro on 10/27/2022.  labs results in chart, ekg in chart.  Preop teaching done, pt able to verbalize understanding.   medications day of procedure-  amlodipine, atenolol, albuterol,  Abreva   pst request   cardiology eval in chart  echo 2022 in chart   dental eval-  pt with loose tooth instructed to obtain dental eval, Dr. Lin notified via email

## 2022-10-25 NOTE — H&P PST ADULT - RESPIRATORY
normal/clear to auscultation bilaterally/no wheezes/no rales/no rhonchi/no use of accessory muscles/no subcutaneous emphysema/breath sounds equal/good air movement/respirations non-labored/no intercostal retractions

## 2022-10-25 NOTE — H&P PST ADULT - ATTENDING COMMENTS
Patient here for flexible bronchoscopy, robotic assisted navigation bronchoscopy, lung biopsy, ebus and lymph node biopsy. Risks including but not limited to pneumothorax and bleeding discussed, and possible interventions. Agreeable to proceed.

## 2022-10-25 NOTE — H&P PST ADULT - SURGICAL SITE INCISION
-- DO NOT REPLY / DO NOT REPLY ALL --  -- Message is from the [facility]--    General Patient Message      Reason for Call: Patient is scheduled 01/21/2020 at 10am at [facility] for her CT cervical spine and they are requesting the authorization to be sent to them.     Fax number: [phone] (attn: Zoe)    Caller Information       Type Contact Phone    01/15/2020 10:33 AM Phone (Incoming) [facility] CENTRAL SCHEDULING (Provider) [phone]          Alternative phone number: n/a    Turnaround time given to caller:   \"This message will be sent to [state Provider's name]. The clinical team will fulfill your request as soon as they review your message.\"}     no

## 2022-10-25 NOTE — H&P PST ADULT - GASTROINTESTINAL
negative normal/soft/nontender/nondistended/normal active bowel sounds/no guarding/no rigidity/no organomegaly/no palpable sandra/no masses palpable

## 2022-10-26 ENCOUNTER — NON-APPOINTMENT (OUTPATIENT)
Age: 77
End: 2022-10-26

## 2022-10-26 ENCOUNTER — TRANSCRIPTION ENCOUNTER (OUTPATIENT)
Age: 77
End: 2022-10-26

## 2022-10-26 ENCOUNTER — APPOINTMENT (OUTPATIENT)
Dept: PULMONOLOGY | Facility: CLINIC | Age: 77
End: 2022-10-26

## 2022-10-26 VITALS
TEMPERATURE: 97.3 F | HEIGHT: 61 IN | BODY MASS INDEX: 21.34 KG/M2 | DIASTOLIC BLOOD PRESSURE: 67 MMHG | RESPIRATION RATE: 17 BRPM | HEART RATE: 73 BPM | SYSTOLIC BLOOD PRESSURE: 146 MMHG | WEIGHT: 113 LBS | OXYGEN SATURATION: 95 %

## 2022-10-26 PROCEDURE — 99214 OFFICE O/P EST MOD 30 MIN: CPT

## 2022-10-26 NOTE — ASU PATIENT PROFILE, ADULT - FALL HARM RISK - UNIVERSAL INTERVENTIONS
Bed in lowest position, wheels locked, appropriate side rails in place/Call bell, personal items and telephone in reach/Instruct patient to call for assistance before getting out of bed or chair/Non-slip footwear when patient is out of bed/[place] to call system/Physically safe environment - no spills, clutter or unnecessary equipment/Purposeful Proactive Rounding/Room/bathroom lighting operational, light cord in reach

## 2022-10-26 NOTE — DISCUSSION/SUMMARY
[FreeTextEntry1] : Pulmonary function test reveals moderate to severe reduction with hyperinflation and a decreased diffusion capacity.\par \par In summary, this is a 76-year-old woman with a 60-pack-year tobacco history who quit 3 months ago, recently diagnosed with CML as well as a large partially calcified right hilar mass that is PET positive.  And moderate to severe COPD.\par \par Patient is going for bronchoscopy tomorrow.  Further evaluation and treatment based on those results.  She is being followed by interventional pulmonary as well as thoracic surgery at this time.\par \par Given severity of her obstruction and symptoms, I would prefer switching the Breo to Trelegy.  Prescription was sent to the mail order, she will see how much it cost.  If not, we can also just add a LAMA to the Breo\par Continue albuterol as needed\par \par May consider pulmonary rehab as well, if she is not not benefiting from her functional status with these inhalers.\par \par She is up-to-date with COVID, influenza, and Pneum vaccinations\par \par Follow-up in 2 to 3 weeks after above procedures

## 2022-10-26 NOTE — HISTORY OF PRESENT ILLNESS
[Former] : former [TextBox_4] : 76-year-old woman here for initial evaluation with me.  Accompanied by her daughter.\par \par Patient has a 60-pack-year tobacco history which she quit approximately 3 months ago.\par \par In her wellness visit in September with her physician, she had a CBC which was abnormal, she was referred to hematology oncology and was diagnosed with likely CML.  She is scheduled to start Gleevec.  Patient also complained of shortness of breath and chronic cough, therefore she had a chest x-ray which was abnormal.  She then went on to have a CAT scan as well as a follow-up PET scan, which shows a highly FDG avid partially calcified right perihilar mass suspicious for malignancy.  Also noted are the emphysema and some tree-in-bud opacities.  Patient has been evaluated by thoracic surgery as well as interventional pulmonology.  She is scheduled for bronchoscopy tomorrow with biopsy.  She has also undergone a perfusion scan.\par \par Per the patient, and even more so her daughter who is a nurse, she has been having shortness of breath and dyspnea with exertion for quite some time.  As well as a chronic cough.  Patient thinks she may be can walk about 1 city block before becoming out of breath.  However, she was started on Breo last week, which she does feel has already made some of a difference.  Daughter feels she is definitely coughing less and less out of breath when walking and talking.

## 2022-10-26 NOTE — ASU PATIENT PROFILE, ADULT - NSICDXPASTMEDICALHX_GEN_ALL_CORE_FT
PAST MEDICAL HISTORY:  CML (chronic myelocytic leukemia)     COPD (chronic obstructive pulmonary disease)     Hypertension     Solitary pulmonary nodule

## 2022-10-27 ENCOUNTER — TRANSCRIPTION ENCOUNTER (OUTPATIENT)
Age: 77
End: 2022-10-27

## 2022-10-27 ENCOUNTER — RESULT REVIEW (OUTPATIENT)
Age: 77
End: 2022-10-27

## 2022-10-27 ENCOUNTER — APPOINTMENT (OUTPATIENT)
Dept: PULMONOLOGY | Facility: HOSPITAL | Age: 77
End: 2022-10-27

## 2022-10-27 ENCOUNTER — OUTPATIENT (OUTPATIENT)
Dept: OUTPATIENT SERVICES | Facility: HOSPITAL | Age: 77
LOS: 1 days | Discharge: HOME | End: 2022-10-27

## 2022-10-27 VITALS
OXYGEN SATURATION: 96 % | DIASTOLIC BLOOD PRESSURE: 60 MMHG | RESPIRATION RATE: 14 BRPM | SYSTOLIC BLOOD PRESSURE: 139 MMHG | HEART RATE: 62 BPM | TEMPERATURE: 98 F

## 2022-10-27 VITALS
RESPIRATION RATE: 17 BRPM | DIASTOLIC BLOOD PRESSURE: 64 MMHG | WEIGHT: 110.01 LBS | HEIGHT: 62 IN | HEART RATE: 56 BPM | SYSTOLIC BLOOD PRESSURE: 154 MMHG | OXYGEN SATURATION: 97 % | TEMPERATURE: 98 F

## 2022-10-27 DIAGNOSIS — Z90.710 ACQUIRED ABSENCE OF BOTH CERVIX AND UTERUS: Chronic | ICD-10-CM

## 2022-10-27 DIAGNOSIS — R91.1 SOLITARY PULMONARY NODULE: ICD-10-CM

## 2022-10-27 DIAGNOSIS — L05.91 PILONIDAL CYST WITHOUT ABSCESS: Chronic | ICD-10-CM

## 2022-10-27 PROBLEM — I10 ESSENTIAL (PRIMARY) HYPERTENSION: Chronic | Status: ACTIVE | Noted: 2022-10-25

## 2022-10-27 PROBLEM — J44.9 CHRONIC OBSTRUCTIVE PULMONARY DISEASE, UNSPECIFIED: Chronic | Status: ACTIVE | Noted: 2022-10-25

## 2022-10-27 PROBLEM — C92.10 CHRONIC MYELOID LEUKEMIA, BCR/ABL-POSITIVE, NOT HAVING ACHIEVED REMISSION: Chronic | Status: ACTIVE | Noted: 2022-10-25

## 2022-10-27 PROCEDURE — 31625 BRONCHOSCOPY W/BIOPSY(S): CPT | Mod: 59,GC

## 2022-10-27 PROCEDURE — 88173 CYTOPATH EVAL FNA REPORT: CPT | Mod: 26

## 2022-10-27 PROCEDURE — 31652 BRONCH EBUS SAMPLNG 1/2 NODE: CPT | Mod: GC

## 2022-10-27 PROCEDURE — 88342 IMHCHEM/IMCYTCHM 1ST ANTB: CPT | Mod: 26,59

## 2022-10-27 PROCEDURE — 31645 BRNCHSC W/THER ASPIR 1ST: CPT | Mod: GC

## 2022-10-27 PROCEDURE — 88305 TISSUE EXAM BY PATHOLOGIST: CPT | Mod: 26

## 2022-10-27 PROCEDURE — 31624 DX BRONCHOSCOPE/LAVAGE: CPT | Mod: GC

## 2022-10-27 PROCEDURE — 88360 TUMOR IMMUNOHISTOCHEM/MANUAL: CPT | Mod: 26

## 2022-10-27 PROCEDURE — 31641 BRONCHOSCOPY TREAT BLOCKAGE: CPT | Mod: GC

## 2022-10-27 RX ORDER — HYDROMORPHONE HYDROCHLORIDE 2 MG/ML
0.5 INJECTION INTRAMUSCULAR; INTRAVENOUS; SUBCUTANEOUS ONCE
Refills: 0 | Status: DISCONTINUED | OUTPATIENT
Start: 2022-10-27 | End: 2022-10-27

## 2022-10-27 RX ORDER — ONDANSETRON 8 MG/1
4 TABLET, FILM COATED ORAL ONCE
Refills: 0 | Status: DISCONTINUED | OUTPATIENT
Start: 2022-10-27 | End: 2022-11-10

## 2022-10-27 NOTE — BRIEF OPERATIVE NOTE - NSICDXBRIEFPOSTOP_GEN_ALL_CORE_FT
POST-OP DIAGNOSIS:  Bronchial neoplasm 27-Oct-2022 10:39:41  Stanislav Lozano  Hilar mass 27-Oct-2022 10:39:53  Stanislav Lozano

## 2022-10-27 NOTE — BRIEF OPERATIVE NOTE - NSICDXBRIEFPROCEDURE_GEN_ALL_CORE_FT
PROCEDURES:  Bronchoscopy with endobronchial ultrasound and 1 or 2 biopsies 27-Oct-2022 10:40:24  Stanislav Lozano  Bronchoscopy, with argon plasma coagulation 27-Oct-2022 10:40:42  Stanislav Lozano

## 2022-10-27 NOTE — ASU DISCHARGE PLAN (ADULT/PEDIATRIC) - PROCEDURE
Flexible Bronchoscopy with cold forceps biopsy of right upper lobe endobronchial lesion. Argon plasma coagulation of right upper lobe lesion. Endobronchial ultrasound and fine needle aspiration of lymph nodes at stations 4R and 7.

## 2022-10-27 NOTE — ASU DISCHARGE PLAN (ADULT/PEDIATRIC) - FOLLOW UP APPOINTMENTS
may also call Recovery Room (PACU) 24/7 @ [phone]/[facility]

## 2022-10-27 NOTE — ASU DISCHARGE PLAN (ADULT/PEDIATRIC) - CARE PROVIDER_API CALL
Meaghan Cortes)  Surgery; Thoracic Surgery  [id number] [place] [address]  Phone: [phone]  Fax: [phone]  Follow Up Time:

## 2022-10-27 NOTE — BRIEF OPERATIVE NOTE - OPERATION/FINDINGS
Flexible Bronchoscopy with forceps biopsy of Right upper lobe endobroncial lesion. Argon plasma coagulation of RUL endobronchial lesion. EBUS-FNA of lymph nodes at 4R, and 7. Flexible Bronchoscopy with forceps biopsy/fulguration of Right upper lobe endobroncial lesion. Argon plasma coagulation of RUL endobronchial lesion for hemostasis. EBUS-FNA staging of lymph nodes at 4R, and 7.    #[id number]

## 2022-10-28 ENCOUNTER — NON-APPOINTMENT (OUTPATIENT)
Age: 77
End: 2022-10-28

## 2022-11-10 LAB — NON-GYNECOLOGICAL CYTOLOGY STUDY: SIGNIFICANT CHANGE UP

## 2022-11-11 ENCOUNTER — NON-APPOINTMENT (OUTPATIENT)
Age: 77
End: 2022-11-11

## 2022-11-11 PROBLEM — Z86.79 HISTORY OF IRREGULAR HEARTBEAT: Status: RESOLVED | Noted: 2022-10-17 | Resolved: 2022-11-11

## 2022-11-14 ENCOUNTER — APPOINTMENT (OUTPATIENT)
Dept: THORACIC SURGERY | Facility: CLINIC | Age: 77
End: 2022-11-14

## 2022-11-14 VITALS
HEART RATE: 56 BPM | SYSTOLIC BLOOD PRESSURE: 145 MMHG | HEIGHT: 63 IN | BODY MASS INDEX: 19.49 KG/M2 | DIASTOLIC BLOOD PRESSURE: 86 MMHG | OXYGEN SATURATION: 95 % | WEIGHT: 110 LBS | RESPIRATION RATE: 15 BRPM

## 2022-11-14 VITALS — DIASTOLIC BLOOD PRESSURE: 60 MMHG | SYSTOLIC BLOOD PRESSURE: 130 MMHG

## 2022-11-14 PROCEDURE — 99215 OFFICE O/P EST HI 40 MIN: CPT

## 2022-11-14 NOTE — REVIEW OF SYSTEMS
[Recent Weight Loss (___ Lbs)] : recent [unfilled] ~Ulb weight loss [SOB on Exertion] : shortness of breath during exertion [Negative] : Heme/Lymph

## 2022-11-14 NOTE — HISTORY OF PRESENT ILLNESS
[FreeTextEntry1] : Ms. KALA VALLEJO, 76 year old female, former smoker, w/ hx of HTN, COPD, leukocytosis, who presented to PCP for routine physical check, was found to have elevated WBC. Pt was then sent to Hem/Onc Dr. Courtney Pandya for further workup. Now s/p bone marrow biopsy, + for CML. Pt was also instructed to do a CXR, which showed a lung mass incidentally. \par \par CXR on 22:\par - 3 and 4 cm Rt hilar masses concerning for neoplasm\par \par CT Chest w/IV contrast on 22:\par - emphysema\par - mild biapical scarring\par - tree-in-bud opacities with anterior RUL, likely impacted airways\par - 6.4 cm partially calcified  Rt hilar mass within RUL/superior segment of RLL\par - pancreatic duct and CBD are mildly dilated\par \par BW for Quantiferon, Fungitell and Aspergillus are negative. ACE level is elevated. \par \par 2022: bone marrow biopsy and Bloodwork demonstrating BCR/ABL positive CML, not myelofibrosis, not blast phase. \par \par PET/CT on 10/7/22:\par - suboptimally visualized hypodense thyroid nodules with NO ABNORMAL uptake, measuring up to approximately 1.0 cm\par - heterogeneously calcified mass at the RIGHT hilum demonstrates intense uptake, 5.3 x 4.3 cm, SUV 13.5\par - mild uptake seen at peripherally located tree-in-bud nodularity in the anterior RIGHT UPPER lobe, SUV 1.2\par - heterogeneous, moderate to intense uptake seen within the skeleton, predominantly at the spine and pelvic bones, without focally suspicious findings\par \par ECHO on 10/20/22: EF: 65%\par \par PFTs on 10/21: FVC: 1.5 (61%); FEV1: 0.92 (50%); DLCO: 10.6 (56%)\par \par VQ scan on 10/25/22:\par Right lun.34% Left lun.66%\par Right upper lun.74%    Left upper lung: 15.41%\par Right mid lun.17% Left mid lun.96%\par Right lower lung:  15.43%    Left lower lun.30%\par \par s/p Flex bronch, endobronchial biopsy, argon plasma coagulation and electrocautery assisted tumor debulking and biopsy, EBUS guided lymph node biopsy of level 4R and level VII lymph nodes on 10/27/22\par Path: RUL biopsy: + Squamous cell carcinoma. PDL1 TPS: 2%\par LN level 4R and level 7 : Negative for malignancy; Favor a reactive LN\par \par *****OF NOTE per HemeOnc***Patient can wait for the lung tumor to be rectified before stating Gleevec for the chronic phase CML. There is a small chance that the Gleevec could affect the lung, meanwhile no urgency to start treatment for the CML.\par \par Presents to office today to discuss biopsy results as well as further plan of care. Patient reports feeling well. Reports SOB when going up and down stairs. Reports recent weight loss. Denies dizziness, hemoptysis, new coughing.\par \par \par

## 2022-11-14 NOTE — DATA REVIEWED
[FreeTextEntry1] : Independently reviewed the following:\par - PFTs 10/2022\par - VQ scan 10/2022\par - Biopsy results 10/2022\par - ECHO 10/2022

## 2022-11-14 NOTE — ASSESSMENT
[FreeTextEntry1] : Ms. KALA VALLEJO, 76 year old female, former smoker, w/ hx of HTN, COPD; Now w/ recent diagnosis of CML. During workup, incidentally found to have right hilar mass. Upon initial thoracic surgery consultation, was advised to undergo additional diagnostic testing for further characterization of lung finding as well as to determine baseline pulmonary function. She is here today to discuss results and plan of care. Follows with Dr. Dion Pandya ([facility])\par \par Plan:\par \par 1.Patient would require pneumonectomy, given PFT not a surgical candidate at this time.\par \par 1. Follow up at [facility] to establish oncology care. Patient records to be emailed to provided address.\par \par \par \par I personally performed the services described in the documentation, reviewed the documentation recorded by the scribe in my presence and it accurately and completely records my words and actions.\par \par  \par I, Cathryn Gaston NP, am scribing for and the presence of TRACEY Stanley, the following sections HISTORY OF PRESENT ILLNESS, PAST MEDICAL/FAMILY/SOCIAL HISTORY; REVIEW OF SYSTEMS; VITAL SIGNS; PHYSICAL EXAM; DISPOSITION.\par

## 2022-11-14 NOTE — CONSULT LETTER
[Dear  ___] : Dear  [unfilled], [Consult Letter:] : I had the pleasure of evaluating your patient, [unfilled]. [( Thank you for referring [unfilled] for consultation for _____ )] : Thank you for referring [unfilled] for consultation for [unfilled] [Please see my note below.] : Please see my note below. [Consult Closing:] : Thank you very much for allowing me to participate in the care of this patient.  If you have any questions, please do not hesitate to contact me. [Sincerely,] : Sincerely, [FreeTextEntry2] : Dr. Courtney Pandya (ref/Hem/Onc) [FreeTextEntry3] : Meaghan Cortes MD\par Attending Surgeon\par Division of Thoracic Surgery\par , [facility] [employer] [facility]\par

## 2022-11-14 NOTE — PHYSICAL EXAM
[Auscultation Breath Sounds / Voice Sounds] : lungs were clear to auscultation bilaterally [Heart Rate And Rhythm] : heart rate was normal and rhythm regular [Heart Sounds] : normal S1 and S2 [Heart Sounds Gallop] : no gallops [Murmurs] : no murmurs [Heart Sounds Pericardial Friction Rub] : no pericardial rub [Examination Of The Chest] : the chest was normal in appearance [Chest Visual Inspection Thoracic Asymmetry] : no chest asymmetry [Diminished Respiratory Excursion] : normal chest expansion [2+] : left 2+ [No Abnormalities] : the abdominal aorta was not enlarged and no bruit was heard [Bowel Sounds] : normal bowel sounds [Abdomen Soft] : soft [Abdomen Tenderness] : non-tender [] : no hepato-splenomegaly [Abdomen Mass (___ Cm)] : no abdominal mass palpated [Cervical Lymph Nodes Enlarged Posterior Bilaterally] : posterior cervical [Cervical Lymph Nodes Enlarged Anterior Bilaterally] : anterior cervical [Supraclavicular Lymph Nodes Enlarged Bilaterally] : supraclavicular [Axillary Lymph Nodes Enlarged Bilaterally] : axillary [Femoral Lymph Nodes Enlarged Bilaterally] : femoral [Inguinal Lymph Nodes Enlarged Bilaterally] : inguinal [Right Carotid Bruit] : no bruit heard over the right carotid [Left Carotid Bruit] : no bruit heard over the left carotid [Right Femoral Bruit] : no bruit heard over the right femoral artery [Left Femoral Bruit] : no bruit heard over the left femoral artery

## 2022-11-28 ENCOUNTER — OUTPATIENT (OUTPATIENT)
Dept: OUTPATIENT SERVICES | Facility: HOSPITAL | Age: 77
LOS: 1 days | Discharge: HOME | End: 2022-11-28

## 2022-11-28 ENCOUNTER — APPOINTMENT (OUTPATIENT)
Dept: CARDIOLOGY | Facility: CLINIC | Age: 77
End: 2022-11-28

## 2022-11-28 VITALS
DIASTOLIC BLOOD PRESSURE: 71 MMHG | WEIGHT: 113 LBS | BODY MASS INDEX: 20.02 KG/M2 | SYSTOLIC BLOOD PRESSURE: 129 MMHG | HEART RATE: 64 BPM | OXYGEN SATURATION: 96 % | RESPIRATION RATE: 16 BRPM | TEMPERATURE: 97.6 F | HEIGHT: 63 IN

## 2022-11-28 DIAGNOSIS — L05.91 PILONIDAL CYST WITHOUT ABSCESS: Chronic | ICD-10-CM

## 2022-11-28 DIAGNOSIS — Z01.812 ENCOUNTER FOR PREPROCEDURAL LABORATORY EXAMINATION: ICD-10-CM

## 2022-11-28 DIAGNOSIS — Z90.710 ACQUIRED ABSENCE OF BOTH CERVIX AND UTERUS: Chronic | ICD-10-CM

## 2022-11-28 PROCEDURE — 93000 ELECTROCARDIOGRAM COMPLETE: CPT

## 2022-11-28 PROCEDURE — 93010 ELECTROCARDIOGRAM REPORT: CPT

## 2022-11-28 PROCEDURE — 99215 OFFICE O/P EST HI 40 MIN: CPT

## 2022-11-28 NOTE — PHYSICAL EXAM
[Well Developed] : well developed [No Acute Distress] : no acute distress [Normal Conjunctiva] : normal conjunctiva [No Xanthelasma] : no xanthelasma [Normal Venous Pressure] : normal venous pressure [No Carotid Bruit] : no carotid bruit [Normal S1, S2] : normal S1, S2 [No Murmur] : no murmur [No Rub] : no rub [No Gallop] : no gallop [Clear Lung Fields] : clear lung fields [Good Air Entry] : good air entry [No Respiratory Distress] : no respiratory distress  [Soft] : abdomen soft [Normal Gait] : normal gait [Gait - Sufficient for Exercise Testing] : gait - sufficient for exercise testing [No Edema] : no edema [No Cyanosis] : no cyanosis [No Clubbing] : no clubbing [No Varicosities] : no varicosities [No Rash] : no rash [Moves all extremities] : moves all extremities [No Focal Deficits] : no focal deficits [Normal Speech] : normal speech [Alert and Oriented] : alert and oriented [de-identified] : irregular heart beat

## 2022-11-28 NOTE — HISTORY OF PRESENT ILLNESS
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 She chose to go to [facility] for a second opinion and will begin chemo/radiation in a few weeks. Plan for 7 weeks of radiation 35 fractions total (5 days per week for 7 weeks) along with 7 weekly cycles of chemotherapy. Possibility of immunotherapy was discussed after this. CML to be managed conservatively with observation pending treatment of lung cancer.\par \par Started apixaban and notes some easier bleeding (from lip), but was able to achieve hemostasis with pressure and denies any significant blood loss. Occasional palpitations when arguing with her partner. No chest pain. No edema, orthopnea.\par \par \par History:\par KALA VALLEJO is a 76 year woman smoker with a history of hypertension for many years found to have CML after routine blood tests done by her PCP. During workup for CML, imaging of the chest showed a 6.4 cm partially calcified right hilar mass in the right upper lobe.\par \par Is on atenolol 50, amlodipine, and valsartan/HCTZ for hypertension, but at times BP remains uncontrolled.\par \par \par Cardiovascular Summary:\par ----------------------------------------------\par ECG:\par 11/28/2022: NSR 60 bpm, possible lateral infarct, possible inferior infarct. No significant change.\par 10/14/2022: NSR, possible inferior infarct, possible anterior infarct.\par 9/15/2022: NSR, non-specific IVCD - similar to 10/14/2022\par ----------------------------------------------\par Echo:\par 10/20/2022: EF 65 %, GLS -22.6 %\par ----------------------------------------------\par CT/MRI\par 9/28/2022: coronary/aortic calcifications, 6.4 cm right hilar mass as above\par ----------------------------------------------\par Remote/ambulatory rhythm monitoring:\par 11/2022: Zio patch - atrial fibrillation\par

## 2022-11-28 NOTE — REVIEW OF SYSTEMS
[Dyspnea on exertion] : dyspnea during exertion [Negative] : Heme/Lymph [Palpitations] : palpitations

## 2022-11-28 NOTE — REASON FOR VISIT
[Family Member] : family member [Other: _____] : [unfilled] [FreeTextEntry3] : [facility] (Dr. Leon) [FreeTextEntry1] : KALA VALLEJO is a 76 year woman with a history of COPD recently diagnosed with CML and lung mass who presents for follow up of paroxysmal atrial fibrillation, HTN, and HLD.\par \par \par

## 2022-11-28 NOTE — ASSESSMENT
[FreeTextEntry1] : 76 year old woman with ASCVD risk factors, atherosclerotic calcifications on CT scan, newly diagnosed with CML and stage II lung cancer pending initial treatment with chemotherapy/radiation.\par \par ECG suggestive of long-standing hypertension. And echo showed normal LVEF and strain with concentric LVH.\par \par Patch monitoring done for irregular rhythm disclosed paroxysmal atrial fibrillation, mostly at night, rates controlled. Started on apixaban for stroke prevention. Discussed with patient and her daughter the risk/benefits and approach to management.\par \par Needs statin for atherosclerosis.\par \par # Paroxysmal AF:\par - continue apixaban 5 mg BID\par - ok to hold as needed for procedures\par - hold for platelet < 75 or bleeding\par - continue atenolol 50 for now\par \par # Hypertension: BP controlled today. Discussed possibility of labile BP after starting chemotherapy due to dehydration, changes in PO intake, and tumor lysis etc.\par - will monitor BP and call if readings become out of range\par - will continue losartan-HCTZ and atenolol for now\par \par # Atherosclerotic calcifications, CML, lung cancer. JAK2 negative.\par - add rosuvastatin 5 mg daily\par - Lipid panel 9/16/22: , non-, HDL 76, total 223\par \par Follow up with me in 3 months\par \par All above recommendations were discussed with the patient, her partner, and her daughter (by telephone) and all questions answered to the best of my ability and their apparent satisfaction.

## 2022-11-29 ENCOUNTER — APPOINTMENT (OUTPATIENT)
Dept: PULMONOLOGY | Facility: CLINIC | Age: 77
End: 2022-11-29

## 2022-11-29 VITALS
RESPIRATION RATE: 16 BRPM | TEMPERATURE: 97.3 F | OXYGEN SATURATION: 96 % | DIASTOLIC BLOOD PRESSURE: 68 MMHG | WEIGHT: 110.25 LBS | HEIGHT: 63 IN | BODY MASS INDEX: 19.54 KG/M2 | SYSTOLIC BLOOD PRESSURE: 120 MMHG | HEART RATE: 72 BPM

## 2022-11-29 PROCEDURE — 99213 OFFICE O/P EST LOW 20 MIN: CPT

## 2022-11-29 NOTE — DISCUSSION/SUMMARY
[FreeTextEntry1] : SCC , RUL endobronchial biopsy.\par Moderate to severe COPD\par Not a candidate for surgery, which would need to be a pneumonectomy due to location.\par Scheduled to start chemo and RT at [facility] next month\par CML, treatment oh hold for now\par \par COPD - doing well on daily Trelegy\par VEE greatly improved.\par Albuterol prn\par \par Up to date with flu, pna and COVID vaccinations

## 2022-11-29 NOTE — HISTORY OF PRESENT ILLNESS
[TextBox_4] : Follow up visit. \par \par Patient was evaluated by thoracic surgery for the SCC, not a surgical candidate due to location, would require pneumonectomy and patient's lung function is not adequate.\par \par PET and Brain MRI negative\par \par She is now being [place] [facility] [place] planning to start concomitant chemotherapy and RT\par \par CML tx on hold\par \par Using Trelegy daily

## 2022-11-29 NOTE — CONSULT LETTER
[Consult Letter:] : I had the pleasure of evaluating your patient, [unfilled]. [FreeTextEntry2] : Dr. Woo Leon\par [facility] [address]\par [place]

## 2023-02-12 NOTE — PHYSICAL EXAM
[Well Developed] : well developed [No Acute Distress] : no acute distress [Normal Conjunctiva] : normal conjunctiva [No Xanthelasma] : no xanthelasma [Normal Venous Pressure] : normal venous pressure [No Carotid Bruit] : no carotid bruit [Normal S1, S2] : normal S1, S2 [No Murmur] : no murmur [No Rub] : no rub [No Gallop] : no gallop [Clear Lung Fields] : clear lung fields [Good Air Entry] : good air entry [No Respiratory Distress] : no respiratory distress  [Soft] : abdomen soft [Normal Gait] : normal gait [Gait - Sufficient for Exercise Testing] : gait - sufficient for exercise testing [No Edema] : no edema [No Cyanosis] : no cyanosis [No Clubbing] : no clubbing [No Varicosities] : no varicosities [No Rash] : no rash [Moves all extremities] : moves all extremities [No Focal Deficits] : no focal deficits [Normal Speech] : normal speech [Alert and Oriented] : alert and oriented

## 2023-02-13 ENCOUNTER — RESULT REVIEW (OUTPATIENT)
Age: 78
End: 2023-02-13

## 2023-02-13 ENCOUNTER — OUTPATIENT (OUTPATIENT)
Dept: OUTPATIENT SERVICES | Facility: HOSPITAL | Age: 78
LOS: 1 days | Discharge: HOME | End: 2023-02-13
Payer: MEDICARE

## 2023-02-13 ENCOUNTER — APPOINTMENT (OUTPATIENT)
Dept: CARDIOLOGY | Facility: CLINIC | Age: 78
End: 2023-02-13
Payer: MEDICARE

## 2023-02-13 VITALS
BODY MASS INDEX: 21.48 KG/M2 | HEART RATE: 72 BPM | TEMPERATURE: 97.7 F | WEIGHT: 121.25 LBS | RESPIRATION RATE: 16 BRPM | SYSTOLIC BLOOD PRESSURE: 122 MMHG | OXYGEN SATURATION: 95 % | DIASTOLIC BLOOD PRESSURE: 68 MMHG | HEIGHT: 63 IN

## 2023-02-13 DIAGNOSIS — C34.90 MALIGNANT NEOPLASM OF UNSPECIFIED PART OF UNSPECIFIED BRONCHUS OR LUNG: ICD-10-CM

## 2023-02-13 DIAGNOSIS — Z90.710 ACQUIRED ABSENCE OF BOTH CERVIX AND UTERUS: Chronic | ICD-10-CM

## 2023-02-13 DIAGNOSIS — L05.91 PILONIDAL CYST WITHOUT ABSCESS: Chronic | ICD-10-CM

## 2023-02-13 DIAGNOSIS — I48.0 PAROXYSMAL ATRIAL FIBRILLATION: ICD-10-CM

## 2023-02-13 LAB
BASOPHILS # BLD AUTO: 0.14 K/UL — SIGNIFICANT CHANGE UP (ref 0–0.2)
BASOPHILS NFR BLD AUTO: 2.9 % — HIGH (ref 0–2)
EOSINOPHIL # BLD AUTO: 0.06 K/UL — SIGNIFICANT CHANGE UP (ref 0–0.5)
EOSINOPHIL NFR BLD AUTO: 1.2 % — SIGNIFICANT CHANGE UP (ref 0–6)
HCT VFR BLD CALC: 39.6 % — SIGNIFICANT CHANGE UP (ref 34.5–45)
HGB BLD-MCNC: 12.8 G/DL — SIGNIFICANT CHANGE UP (ref 11.5–15.5)
IMM GRANULOCYTES NFR BLD AUTO: 0.4 % — SIGNIFICANT CHANGE UP (ref 0–0.9)
LYMPHOCYTES # BLD AUTO: 0.28 K/UL — LOW (ref 1–3.3)
LYMPHOCYTES # BLD AUTO: 5.7 % — LOW (ref 13–44)
MCHC RBC-ENTMCNC: 29.5 PG — SIGNIFICANT CHANGE UP (ref 27–34)
MCHC RBC-ENTMCNC: 32.3 G/DL — SIGNIFICANT CHANGE UP (ref 32–36)
MCV RBC AUTO: 91.2 FL — SIGNIFICANT CHANGE UP (ref 80–100)
MONOCYTES # BLD AUTO: 0.35 K/UL — SIGNIFICANT CHANGE UP (ref 0–0.9)
MONOCYTES NFR BLD AUTO: 7.1 % — SIGNIFICANT CHANGE UP (ref 2–14)
NEUTROPHILS # BLD AUTO: 4.06 K/UL — SIGNIFICANT CHANGE UP (ref 1.8–7.4)
NEUTROPHILS NFR BLD AUTO: 82.7 % — HIGH (ref 43–77)
NRBC # BLD: 0 /100 WBCS — SIGNIFICANT CHANGE UP (ref 0–0)
PLATELET # BLD AUTO: 429 K/UL — HIGH (ref 150–400)
RBC # BLD: 4.34 M/UL — SIGNIFICANT CHANGE UP (ref 3.8–5.2)
RBC # FLD: 15.7 % — HIGH (ref 10.3–14.5)
TROPONIN-T, HIGH SENSITIVITY: 7 NG/L
WBC # BLD: 4.91 K/UL — SIGNIFICANT CHANGE UP (ref 3.8–10.5)
WBC # FLD AUTO: 4.91 K/UL — SIGNIFICANT CHANGE UP (ref 3.8–10.5)

## 2023-02-13 PROCEDURE — 93000 ELECTROCARDIOGRAM COMPLETE: CPT

## 2023-02-13 PROCEDURE — 93010 ELECTROCARDIOGRAM REPORT: CPT

## 2023-02-13 PROCEDURE — 99215 OFFICE O/P EST HI 40 MIN: CPT

## 2023-02-13 RX ORDER — ONDANSETRON 8 MG/1
8 TABLET ORAL
Qty: 30 | Refills: 0 | Status: ACTIVE | COMMUNITY
Start: 2022-12-13

## 2023-02-13 RX ORDER — OMEPRAZOLE 20 MG/1
20 CAPSULE, DELAYED RELEASE ORAL
Qty: 30 | Refills: 0 | Status: ACTIVE | COMMUNITY
Start: 2023-01-12

## 2023-02-13 RX ORDER — APIXABAN 5 MG/1
5 TABLET, FILM COATED ORAL
Qty: 180 | Refills: 3 | Status: DISCONTINUED | COMMUNITY
Start: 2022-11-15 | End: 2023-02-13

## 2023-02-13 RX ORDER — CLONAZEPAM 0.25 MG/1
0.25 TABLET, ORALLY DISINTEGRATING ORAL
Qty: 60 | Refills: 0 | Status: ACTIVE | COMMUNITY
Start: 2022-12-15

## 2023-02-13 NOTE — ASSESSMENT
[FreeTextEntry1] : 77 year old woman with ASCVD risk factors, atherosclerotic calcifications on CT scan, newly diagnosed with CML and stage II lung cancer pending initial treatment with chemotherapy/radiation.\par \par ECG suggestive of long-standing hypertension. And echo showed normal LVEF and strain with concentric LVH.\par \par Patch monitoring done for irregular rhythm disclosed paroxysmal atrial fibrillation, mostly at night, rates controlled. Started on apixaban for stroke prevention. Discussed with patient and her daughter the risk/benefits and approach to management.\par \par Needs statin for atherosclerosis.\par \par # Paroxysmal AF:\par - will change apixaban to rivaroxaban 20 mg daily for insurance coverage\par - ok to hold as needed for procedures, hold 3 days prior to tooth extraction/dental work\par - hold for platelet < 75 or bleeding\par - continue atenolol 50 for now\par \par # Hypertension: BP Discussed possibility of labile BP after starting chemotherapy due to dehydration, changes in PO intake, and tumor lysis etc. BP normal today.\par - will monitor BP and call if readings become out of range\par - continue valsartan-HCTZ 160-12.5, atenolol 50, amlodipine 5 mg daily\par \par # Atherosclerotic calcifications, CML, lung cancer. JAK2 negative.\par - continue rosuvastatin 5 mg daily\par - Lipid panel 9/16/22: , non-, HDL 76, total 223\par - baseline troponin T and troponin I today prior to immunotherapy\par \par Follow up with me in 3 months (after starting immunotherapy).\par \par All above recommendations were discussed with the patient and Trica her daughter (by telephone) and all questions answered to the best of my ability and their apparent satisfaction.

## 2023-02-13 NOTE — HISTORY OF PRESENT ILLNESS
[FreeTextEntry1] : Interval History:\par Has difficulty affording apixaban, Xarelto preferred (tier 3).\par Feels well. No palpitations. 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 [place] [facility] [place] and will begin chemo/radiation in a few weeks. Plan for 7 weeks of radiation 35 fractions total (5 days per week for 7 weeks) along with 7 weekly cycles of chemotherapy to be followed by immune-checkpoint inhibitor. CML to be managed conservatively with observation pending treatment of lung cancer.\par \par Started apixaban and notes bleeding from lip, but able to achieve hemostasis with pressure and denies significant blood loss. Occasional palpitations when arguing with Ariana. No chest pain. No edema, orthopnea.\par \par \par Cardiovascular Summary:\par ----------------------------------------------\par ECG:\par 2/13/2023: NSR, possible lateral infarct, possible inferior infarct. No change from prior.\par 11/28/2022: NSR 60 bpm, possible lateral infarct, possible inferior infarct. No significant change.\par 10/14/2022: NSR, possible inferior infarct, possible anterior infarct.\par 9/15/2022: NSR, non-specific IVCD - similar to 10/14/2022\par ----------------------------------------------\par Echo:\par 10/20/2022: EF 65 %, GLS -22.6 %\par ----------------------------------------------\par CT/MRI\par 9/28/2022: coronary/aortic calcifications, 6.4 cm right hilar mass as above\par ----------------------------------------------\par Remote/ambulatory rhythm monitoring:\par 11/2022: Zio patch - atrial fibrillation

## 2023-02-13 NOTE — REVIEW OF SYSTEMS
[Dyspnea on exertion] : dyspnea during exertion [Palpitations] : palpitations [Negative] : Heme/Lymph [Dysphagia] : dysphagia

## 2023-02-13 NOTE — REASON FOR VISIT
[Family Member] : family member [Other: _____] : [unfilled] [FreeTextEntry3] [place] [facility] [place] [FreeTextEntry1] : KALA VALLEJO is a 77 year old woman with a history of COPD recently diagnosed with CML and non-small cell lung carcinoma seen for follow up of paroxysmal atrial fibrillation, HTN, and HLD.\par \par Prior Cancer Treatments:\par ------------------------------------------------------------------------\par Chemo/targeted therapy:\par 12/2022-2/2023: carbo/paclitaxel\par ------------------------------------------------------------------------\par Surgery:\par ------------------------------------------------------------------------\par Radiation:\par 12/2022-2/2022: 35 fractions over 7 weeks

## 2023-02-14 LAB
ALBUMIN SERPL ELPH-MCNC: 4 G/DL
ALP BLD-CCNC: 159 U/L
ALT SERPL-CCNC: 24 U/L
ANION GAP SERPL CALC-SCNC: 10 MMOL/L
AST SERPL-CCNC: 20 U/L
BILIRUB SERPL-MCNC: 1.1 MG/DL
BUN SERPL-MCNC: 17 MG/DL
CALCIUM SERPL-MCNC: 10 MG/DL
CHLORIDE SERPL-SCNC: 101 MMOL/L
CO2 SERPL-SCNC: 31 MMOL/L
CREAT SERPL-MCNC: 0.54 MG/DL
EGFR: 95 ML/MIN/1.73M2
GLUCOSE SERPL-MCNC: 99 MG/DL
NT-PROBNP SERPL-MCNC: 275 PG/ML
POTASSIUM SERPL-SCNC: 4.4 MMOL/L
PROT SERPL-MCNC: 5.9 G/DL
SODIUM SERPL-SCNC: 141 MMOL/L
TROPONIN I SERPL-MCNC: <0.01 NG/ML

## 2023-02-15 LAB
CHOLEST SERPL-MCNC: 171 MG/DL
HDLC SERPL-MCNC: 88 MG/DL
LDLC SERPL CALC-MCNC: 65 MG/DL
NONHDLC SERPL-MCNC: 83 MG/DL
TRIGL SERPL-MCNC: 90 MG/DL

## 2023-02-17 ENCOUNTER — INPATIENT (INPATIENT)
Facility: HOSPITAL | Age: 78
LOS: 3 days | Discharge: HOME | End: 2023-02-21
Attending: INTERNAL MEDICINE | Admitting: INTERNAL MEDICINE
Payer: MEDICARE

## 2023-02-17 VITALS
RESPIRATION RATE: 19 BRPM | HEIGHT: 63 IN | SYSTOLIC BLOOD PRESSURE: 113 MMHG | HEART RATE: 99 BPM | WEIGHT: 119.93 LBS | OXYGEN SATURATION: 97 % | TEMPERATURE: 102 F | DIASTOLIC BLOOD PRESSURE: 77 MMHG

## 2023-02-17 DIAGNOSIS — L05.91 PILONIDAL CYST WITHOUT ABSCESS: Chronic | ICD-10-CM

## 2023-02-17 DIAGNOSIS — Z90.710 ACQUIRED ABSENCE OF BOTH CERVIX AND UTERUS: Chronic | ICD-10-CM

## 2023-02-17 LAB
ALBUMIN SERPL ELPH-MCNC: 2.9 G/DL — LOW (ref 3.3–5)
ALP SERPL-CCNC: 108 U/L — SIGNIFICANT CHANGE UP (ref 40–120)
ALT FLD-CCNC: 21 U/L — SIGNIFICANT CHANGE UP (ref 12–78)
ANION GAP SERPL CALC-SCNC: 9 MMOL/L — SIGNIFICANT CHANGE UP (ref 5–17)
APPEARANCE UR: CLEAR — SIGNIFICANT CHANGE UP
APTT BLD: 36 SEC — HIGH (ref 27.5–35.5)
AST SERPL-CCNC: 18 U/L — SIGNIFICANT CHANGE UP (ref 15–37)
BASOPHILS # BLD AUTO: 0.09 K/UL — SIGNIFICANT CHANGE UP (ref 0–0.2)
BASOPHILS NFR BLD AUTO: 2.1 % — HIGH (ref 0–2)
BILIRUB SERPL-MCNC: 1 MG/DL — SIGNIFICANT CHANGE UP (ref 0.2–1.2)
BILIRUB UR-MCNC: NEGATIVE — SIGNIFICANT CHANGE UP
BUN SERPL-MCNC: 23 MG/DL — SIGNIFICANT CHANGE UP (ref 7–23)
CALCIUM SERPL-MCNC: 8.2 MG/DL — LOW (ref 8.5–10.1)
CHLORIDE SERPL-SCNC: 101 MMOL/L — SIGNIFICANT CHANGE UP (ref 96–108)
CO2 SERPL-SCNC: 28 MMOL/L — SIGNIFICANT CHANGE UP (ref 22–31)
COLOR SPEC: YELLOW — SIGNIFICANT CHANGE UP
COMMENT - URINE: SIGNIFICANT CHANGE UP
CREAT SERPL-MCNC: 0.64 MG/DL — SIGNIFICANT CHANGE UP (ref 0.5–1.3)
DIFF PNL FLD: ABNORMAL
EGFR: 92 ML/MIN/1.73M2 — SIGNIFICANT CHANGE UP
EOSINOPHIL # BLD AUTO: 0 K/UL — SIGNIFICANT CHANGE UP (ref 0–0.5)
EOSINOPHIL NFR BLD AUTO: 0 % — SIGNIFICANT CHANGE UP (ref 0–6)
EPI CELLS # UR: SIGNIFICANT CHANGE UP
GLUCOSE SERPL-MCNC: 99 MG/DL — SIGNIFICANT CHANGE UP (ref 70–99)
GLUCOSE UR QL: NEGATIVE MG/DL — SIGNIFICANT CHANGE UP
HCT VFR BLD CALC: 35.3 % — SIGNIFICANT CHANGE UP (ref 34.5–45)
HGB BLD-MCNC: 11.5 G/DL — SIGNIFICANT CHANGE UP (ref 11.5–15.5)
IMM GRANULOCYTES NFR BLD AUTO: 0.9 % — SIGNIFICANT CHANGE UP (ref 0–0.9)
INR BLD: 1.54 RATIO — HIGH (ref 0.88–1.16)
KETONES UR-MCNC: ABNORMAL
LACTATE SERPL-SCNC: 0.9 MMOL/L — SIGNIFICANT CHANGE UP (ref 0.7–2)
LEUKOCYTE ESTERASE UR-ACNC: NEGATIVE — SIGNIFICANT CHANGE UP
LYMPHOCYTES # BLD AUTO: 0.27 K/UL — LOW (ref 1–3.3)
LYMPHOCYTES # BLD AUTO: 6.3 % — LOW (ref 13–44)
MCHC RBC-ENTMCNC: 29 PG — SIGNIFICANT CHANGE UP (ref 27–34)
MCHC RBC-ENTMCNC: 32.6 G/DL — SIGNIFICANT CHANGE UP (ref 32–36)
MCV RBC AUTO: 89.1 FL — SIGNIFICANT CHANGE UP (ref 80–100)
MONOCYTES # BLD AUTO: 0.48 K/UL — SIGNIFICANT CHANGE UP (ref 0–0.9)
MONOCYTES NFR BLD AUTO: 11.2 % — SIGNIFICANT CHANGE UP (ref 2–14)
NEUTROPHILS # BLD AUTO: 3.41 K/UL — SIGNIFICANT CHANGE UP (ref 1.8–7.4)
NEUTROPHILS NFR BLD AUTO: 79.5 % — HIGH (ref 43–77)
NITRITE UR-MCNC: NEGATIVE — SIGNIFICANT CHANGE UP
NRBC # BLD: 0 /100 WBCS — SIGNIFICANT CHANGE UP (ref 0–0)
PH UR: 5 — SIGNIFICANT CHANGE UP (ref 5–8)
PLATELET # BLD AUTO: 306 K/UL — SIGNIFICANT CHANGE UP (ref 150–400)
POTASSIUM SERPL-MCNC: 3.3 MMOL/L — LOW (ref 3.5–5.3)
POTASSIUM SERPL-SCNC: 3.3 MMOL/L — LOW (ref 3.5–5.3)
PROT SERPL-MCNC: 6 GM/DL — SIGNIFICANT CHANGE UP (ref 6–8.3)
PROT UR-MCNC: 15 MG/DL
PROTHROM AB SERPL-ACNC: 18.5 SEC — HIGH (ref 10.5–13.4)
RAPID RVP RESULT: SIGNIFICANT CHANGE UP
RBC # BLD: 3.96 M/UL — SIGNIFICANT CHANGE UP (ref 3.8–5.2)
RBC # FLD: 15.4 % — HIGH (ref 10.3–14.5)
RBC CASTS # UR COMP ASSIST: ABNORMAL /HPF (ref 0–4)
SARS-COV-2 RNA SPEC QL NAA+PROBE: SIGNIFICANT CHANGE UP
SODIUM SERPL-SCNC: 138 MMOL/L — SIGNIFICANT CHANGE UP (ref 135–145)
SP GR SPEC: 1.02 — SIGNIFICANT CHANGE UP (ref 1.01–1.02)
UROBILINOGEN FLD QL: NEGATIVE MG/DL — SIGNIFICANT CHANGE UP
WBC # BLD: 4.29 K/UL — SIGNIFICANT CHANGE UP (ref 3.8–10.5)
WBC # FLD AUTO: 4.29 K/UL — SIGNIFICANT CHANGE UP (ref 3.8–10.5)
WBC UR QL: SIGNIFICANT CHANGE UP

## 2023-02-17 PROCEDURE — 70450 CT HEAD/BRAIN W/O DYE: CPT | Mod: 26,MA

## 2023-02-17 PROCEDURE — 99285 EMERGENCY DEPT VISIT HI MDM: CPT | Mod: FS,CS

## 2023-02-17 PROCEDURE — 99223 1ST HOSP IP/OBS HIGH 75: CPT

## 2023-02-17 PROCEDURE — 93010 ELECTROCARDIOGRAM REPORT: CPT

## 2023-02-17 PROCEDURE — 71045 X-RAY EXAM CHEST 1 VIEW: CPT | Mod: 26

## 2023-02-17 RX ORDER — SODIUM CHLORIDE 9 MG/ML
1700 INJECTION INTRAMUSCULAR; INTRAVENOUS; SUBCUTANEOUS ONCE
Refills: 0 | Status: COMPLETED | OUTPATIENT
Start: 2023-02-17 | End: 2023-02-17

## 2023-02-17 RX ORDER — AZITHROMYCIN 500 MG/1
500 TABLET, FILM COATED ORAL ONCE
Refills: 0 | Status: DISCONTINUED | OUTPATIENT
Start: 2023-02-17 | End: 2023-02-20

## 2023-02-17 RX ORDER — VANCOMYCIN HCL 1 G
1000 VIAL (EA) INTRAVENOUS ONCE
Refills: 0 | Status: COMPLETED | OUTPATIENT
Start: 2023-02-17 | End: 2023-02-17

## 2023-02-17 RX ORDER — ACETAMINOPHEN 500 MG
975 TABLET ORAL ONCE
Refills: 0 | Status: COMPLETED | OUTPATIENT
Start: 2023-02-17 | End: 2023-02-17

## 2023-02-17 RX ORDER — CEFTRIAXONE 500 MG/1
1000 INJECTION, POWDER, FOR SOLUTION INTRAMUSCULAR; INTRAVENOUS ONCE
Refills: 0 | Status: COMPLETED | OUTPATIENT
Start: 2023-02-17 | End: 2023-02-17

## 2023-02-17 RX ORDER — POTASSIUM CHLORIDE 20 MEQ
40 PACKET (EA) ORAL ONCE
Refills: 0 | Status: COMPLETED | OUTPATIENT
Start: 2023-02-17 | End: 2023-02-17

## 2023-02-17 RX ADMIN — SODIUM CHLORIDE 1700 MILLILITER(S): 9 INJECTION INTRAMUSCULAR; INTRAVENOUS; SUBCUTANEOUS at 19:10

## 2023-02-17 RX ADMIN — CEFTRIAXONE 100 MILLIGRAM(S): 500 INJECTION, POWDER, FOR SOLUTION INTRAMUSCULAR; INTRAVENOUS at 18:48

## 2023-02-17 RX ADMIN — Medication 975 MILLIGRAM(S): at 19:10

## 2023-02-17 RX ADMIN — Medication 975 MILLIGRAM(S): at 18:14

## 2023-02-17 RX ADMIN — Medication 40 MILLIEQUIVALENT(S): at 21:29

## 2023-02-17 RX ADMIN — SODIUM CHLORIDE 1700 MILLILITER(S): 9 INJECTION INTRAMUSCULAR; INTRAVENOUS; SUBCUTANEOUS at 17:53

## 2023-02-17 RX ADMIN — Medication 250 MILLIGRAM(S): at 21:26

## 2023-02-17 NOTE — ED PROVIDER NOTE - NS ED MD DISPO DIVISION
[facility]

## 2023-02-17 NOTE — H&P ADULT - NSHPLABSRESULTS_GEN_ALL_CORE
=======================================================  Labs:                        11.5   4.29  )-----------( 306      ( 17 Feb 2023 17:58 )             35.3     02-17    138  |  101  |  23  ----------------------------<  99  3.3<L>   |  28  |  0.64    Ca    8.2<L>      17 Feb 2023 17:58    TPro  6.0  /  Alb  2.9<L>  /  TBili  1.0  /  DBili  x   /  AST  18  /  ALT  21  /  AlkPhos  108  02-17      Creatinine, Serum: 0.64 mg/dL (02-17-23 @ 17:58)            WBC Count: 4.29 K/uL (02-17-23 @ 17:58)    SARS-CoV-2: NotDetec (02-17-23 @ 17:58)      Alkaline Phosphatase, Serum: 108 U/L (02-17-23 @ 17:58)  Alanine Aminotransferase (ALT/SGPT): 21 U/L (02-17-23 @ 17:58)  Aspartate Aminotransferase (AST/SGOT): 18 U/L (02-17-23 @ 17:58)  Bilirubin Total, Serum: 1.0 mg/dL (02-17-23 @ 17:58)      < from: CT Head No Cont (02.17.23 @ 19:51) >    IMPRESSION:  1. No evidence of acute territorial infarction, hemorrhage or mass effect.  2. Age-appropriate atrophy and patchy periventricular hypoattenuation,   involving the left greater than right external capsules as well; a   nonspecific finding which statistically reflects chronic microvascular   ischemic change.    --- End of Report ---    < end of copied text >

## 2023-02-17 NOTE — ED PROVIDER NOTE - OBJECTIVE STATEMENT
78 yo F PMHx HTN, Afib on Eliquis, COPD not on home O2, lung CA on chemo/radiation, leukemia presents to ED with daughter c/o fever/chills x 2 days. Daughter also reports having noticed some disorientation/confusion over the last 5 hours. Pt 2 days sp shingles vaccine. Pt denies any specific complaint. Denies HA, dizziness, chest pain, SOB, cough, abd pain, N/V/D, urinary symptoms. Pt states she did not take any medications prior to arrival. 76 yo F PMHx HTN, Afib on Eliquis, COPD not on home O2, lung CA on chemo/radiation, leukemia presents to ED with daughter c/o fever/chills x 2 days. Daughter also reports having noticed some disorientation/confusion over the last 5 hours. Pt 2 days sp shingles vaccine. Pt denies any specific complaint. Denies HA, dizziness, chest pain, SOB, cough, abd pain, N/V/D, urinary symptoms. Pt states she did not take any medications prior to arrival.

## 2023-02-17 NOTE — ED CLERICAL - DIVISION
[facility]... [facility]... [facility]...

## 2023-02-17 NOTE — ED ADULT TRIAGE NOTE - CHIEF COMPLAINT QUOTE
patient BIBA c/o of fever started yesterday , patient has a lung CA last chemo last week , asper daughter Babita patient has episodes of confusion started today , moving all extremities no facial droop clear speech at the time of triage A&Ox3, patient took a shingles shot 3 days ago

## 2023-02-17 NOTE — ED ADULT NURSE NOTE - NSIMPLEMENTINTERV_GEN_ALL_ED
Implemented All Universal Safety Interventions:  [place] to call system. Call bell, personal items and telephone within reach. Instruct patient to call for assistance. Room bathroom lighting operational. Non-slip footwear when patient is off stretcher. Physically safe environment: no spills, clutter or unnecessary equipment. Stretcher in lowest position, wheels locked, appropriate side rails in place. Implemented All Universal Safety Interventions:  [place] to call system. Call bell, personal items and telephone within reach. Instruct patient to call for assistance. Room bathroom lighting operational. Non-slip footwear when patient is off stretcher. Physically safe environment: no spills, clutter or unnecessary equipment. Stretcher in lowest position, wheels locked, appropriate side rails in place. Implemented All Universal Safety Interventions:  [place] to call system. Call bell, personal items and telephone within reach. Instruct patient to call for assistance. Room bathroom lighting operational. Non-slip footwear when patient is off stretcher. Physically safe environment: no spills, clutter or unnecessary equipment. Stretcher in lowest position, wheels locked, appropriate side rails in place.

## 2023-02-17 NOTE — H&P ADULT - PROBLEM SELECTOR PLAN 1
hypotensive in ED , febrile  Unknown source of fever at this time  unlikely due to vaccine considering 48hrs out and tmax 102  broad spectrum coverage Vanc + zosyn  Consider ID in AM   have radiology read the cxr if any pna which I was not able to see considering extensive changes 2/2 lung ca

## 2023-02-17 NOTE — H&P ADULT - NSHPREVIEWOFSYSTEMS_GEN_ALL_CORE
REVIEW OF SYSTEMS:    CONSTITUTIONAL: No weakness, + fevers /chills  EYES/ENT: No visual changes;  No vertigo or throat pain   NECK: No pain or stiffness  RESPIRATORY: + cough baseline,- wheezing, hemoptysis; No shortness of breath though 02 low 90 on RA in ED   CARDIOVASCULAR: No chest pain or palpitations  GASTROINTESTINAL: No abdominal or epigastric pain. No nausea, vomiting, or hematemesis; No diarrhea or constipation. No melena or hematochezia.  GENITOURINARY: No dysuria, frequency or hematuria  NEUROLOGICAL: No numbness or weakness  SKIN: No itching, rashes

## 2023-02-17 NOTE — ED PROVIDER NOTE - NSICDXPASTMEDICALHX_GEN_ALL_CORE_FT
PAST MEDICAL HISTORY:  Atrial fibrillation     COPD (chronic obstructive pulmonary disease)     Hypertension     Leukemia     Lung cancer

## 2023-02-17 NOTE — ED ADULT NURSE NOTE - OBJECTIVE STATEMENT
patient brought in for episode of confusion earlier today. Reports fever since this morning, states received shingles vaccine 2 days prior. Pt is axo4 on assessment, respirations spontaneous and unlabored.   Hx: Leukemia, Lung Ca, COPD, Afib, HTN

## 2023-02-17 NOTE — H&P ADULT - NSHPPHYSICALEXAM_GEN_ALL_CORE
VITALS:   T(C): 38 (02-18-23 @ 05:36), Max: 39 (02-17-23 @ 17:17)  HR: 78 (02-18-23 @ 05:36) (66 - 99)  BP: 120/57 (02-18-23 @ 05:36) (102/50 - 120/57)  RR: 18 (02-18-23 @ 05:36) (16 - 19)  SpO2: 97% (02-18-23 @ 05:36) (95% - 98%)    GENERAL: NAD, lying in bed comfortably  HEAD:  Atraumatic, Normocephalic  EYES: EOMI, PERRLA, conjunctiva and sclera clear  ENT: Moist mucous membranes  NECK: Supple, No JVD  CHEST/LUNG: mild rhonchi,  - wheezing, or rubs. Unlabored respirations  HEART: Regular rate and rhythm; No murmurs, rubs, or gallops  ABDOMEN: BSx4; Soft, nontender, nondistended  EXTREMITIES:  2+ Peripheral Pulses, brisk capillary refill. No clubbing, cyanosis, or edema  NERVOUS SYSTEM:  A&Ox3, no focal deficits   SKIN: No rashes or lesions

## 2023-02-17 NOTE — H&P ADULT - NSHPADDITIONALINFOADULT_GEN_ALL_CORE
HTN meds held for hypotension in ED with BP in the low 100s while normaly BP in the 130s per daughter.   - restart when appropriate.     get accurate med rec in AM

## 2023-02-17 NOTE — ED ADULT NURSE NOTE - CAS EDP DISCH DISPOSITION ADMI
[facility]

## 2023-02-17 NOTE — ED PROVIDER NOTE - ATTENDING APP SHARED VISIT CONTRIBUTION OF CARE
Chest xray looks to me like pneumonia vs lung cancer. given the fever, will start broad spectrum abx but target CAP.

## 2023-02-17 NOTE — H&P ADULT - ASSESSMENT
78 yo F PMHx HTN, Afib on Eliquis, COPD not on home O2, lung CA on chemo/radiation, leukemia presents to ED with daughter c/o fever/chills x 2 days. Denies any specific complaints. last chemo and radiation last week.  76 yo F PMHx HTN, Afib on Eliquis, COPD not on home O2, lung CA on chemo/radiation, leukemia presents to ED with daughter c/o fever/chills x 2 days. Denies any specific complaints. last chemo and radiation last week.

## 2023-02-18 DIAGNOSIS — A41.9 SEPSIS, UNSPECIFIED ORGANISM: ICD-10-CM

## 2023-02-18 DIAGNOSIS — I48.91 UNSPECIFIED ATRIAL FIBRILLATION: ICD-10-CM

## 2023-02-18 DIAGNOSIS — C34.90 MALIGNANT NEOPLASM OF UNSPECIFIED PART OF UNSPECIFIED BRONCHUS OR LUNG: ICD-10-CM

## 2023-02-18 DIAGNOSIS — J44.9 CHRONIC OBSTRUCTIVE PULMONARY DISEASE, UNSPECIFIED: ICD-10-CM

## 2023-02-18 LAB
ANION GAP SERPL CALC-SCNC: 7 MMOL/L — SIGNIFICANT CHANGE UP (ref 5–17)
BUN SERPL-MCNC: 12 MG/DL — SIGNIFICANT CHANGE UP (ref 7–23)
CALCIUM SERPL-MCNC: 8.3 MG/DL — LOW (ref 8.5–10.1)
CHLORIDE SERPL-SCNC: 104 MMOL/L — SIGNIFICANT CHANGE UP (ref 96–108)
CO2 SERPL-SCNC: 30 MMOL/L — SIGNIFICANT CHANGE UP (ref 22–31)
CREAT SERPL-MCNC: 0.45 MG/DL — LOW (ref 0.5–1.3)
CULTURE RESULTS: NO GROWTH — SIGNIFICANT CHANGE UP
EGFR: 100 ML/MIN/1.73M2 — SIGNIFICANT CHANGE UP
GLUCOSE SERPL-MCNC: 79 MG/DL — SIGNIFICANT CHANGE UP (ref 70–99)
POTASSIUM SERPL-MCNC: 3.4 MMOL/L — LOW (ref 3.5–5.3)
POTASSIUM SERPL-SCNC: 3.4 MMOL/L — LOW (ref 3.5–5.3)
SODIUM SERPL-SCNC: 141 MMOL/L — SIGNIFICANT CHANGE UP (ref 135–145)
SPECIMEN SOURCE: SIGNIFICANT CHANGE UP

## 2023-02-18 PROCEDURE — 99233 SBSQ HOSP IP/OBS HIGH 50: CPT

## 2023-02-18 RX ORDER — SODIUM CHLORIDE 9 MG/ML
1000 INJECTION INTRAMUSCULAR; INTRAVENOUS; SUBCUTANEOUS
Refills: 0 | Status: COMPLETED | OUTPATIENT
Start: 2023-02-18 | End: 2023-02-18

## 2023-02-18 RX ORDER — ACETAMINOPHEN 500 MG
650 TABLET ORAL EVERY 6 HOURS
Refills: 0 | Status: DISCONTINUED | OUTPATIENT
Start: 2023-02-18 | End: 2023-02-21

## 2023-02-18 RX ORDER — VANCOMYCIN HCL 1 G
750 VIAL (EA) INTRAVENOUS EVERY 12 HOURS
Refills: 0 | Status: DISCONTINUED | OUTPATIENT
Start: 2023-02-18 | End: 2023-02-21

## 2023-02-18 RX ORDER — PIPERACILLIN AND TAZOBACTAM 4; .5 G/20ML; G/20ML
3.38 INJECTION, POWDER, LYOPHILIZED, FOR SOLUTION INTRAVENOUS EVERY 8 HOURS
Refills: 0 | Status: DISCONTINUED | OUTPATIENT
Start: 2023-02-18 | End: 2023-02-21

## 2023-02-18 RX ORDER — PANTOPRAZOLE SODIUM 20 MG/1
40 TABLET, DELAYED RELEASE ORAL
Refills: 0 | Status: DISCONTINUED | OUTPATIENT
Start: 2023-02-18 | End: 2023-02-21

## 2023-02-18 RX ORDER — RIVAROXABAN 15 MG-20MG
20 KIT ORAL DAILY
Refills: 0 | Status: DISCONTINUED | OUTPATIENT
Start: 2023-02-18 | End: 2023-02-21

## 2023-02-18 RX ORDER — POTASSIUM CHLORIDE 20 MEQ
40 PACKET (EA) ORAL ONCE
Refills: 0 | Status: COMPLETED | OUTPATIENT
Start: 2023-02-18 | End: 2023-02-20

## 2023-02-18 RX ORDER — CLONAZEPAM 1 MG
0.25 TABLET ORAL
Refills: 0 | Status: DISCONTINUED | OUTPATIENT
Start: 2023-02-18 | End: 2023-02-19

## 2023-02-18 RX ORDER — LANOLIN ALCOHOL/MO/W.PET/CERES
3 CREAM (GRAM) TOPICAL AT BEDTIME
Refills: 0 | Status: DISCONTINUED | OUTPATIENT
Start: 2023-02-18 | End: 2023-02-21

## 2023-02-18 RX ORDER — BUDESONIDE AND FORMOTEROL FUMARATE DIHYDRATE 160; 4.5 UG/1; UG/1
2 AEROSOL RESPIRATORY (INHALATION)
Refills: 0 | Status: DISCONTINUED | OUTPATIENT
Start: 2023-02-18 | End: 2023-02-21

## 2023-02-18 RX ORDER — ONDANSETRON 8 MG/1
4 TABLET, FILM COATED ORAL EVERY 8 HOURS
Refills: 0 | Status: DISCONTINUED | OUTPATIENT
Start: 2023-02-18 | End: 2023-02-21

## 2023-02-18 RX ORDER — ALBUTEROL 90 UG/1
2 AEROSOL, METERED ORAL EVERY 6 HOURS
Refills: 0 | Status: DISCONTINUED | OUTPATIENT
Start: 2023-02-17 | End: 2023-02-21

## 2023-02-18 RX ADMIN — Medication 650 MILLIGRAM(S): at 06:11

## 2023-02-18 RX ADMIN — PIPERACILLIN AND TAZOBACTAM 25 GRAM(S): 4; .5 INJECTION, POWDER, LYOPHILIZED, FOR SOLUTION INTRAVENOUS at 21:01

## 2023-02-18 RX ADMIN — RIVAROXABAN 20 MILLIGRAM(S): KIT at 11:32

## 2023-02-18 RX ADMIN — SODIUM CHLORIDE 100 MILLILITER(S): 9 INJECTION INTRAMUSCULAR; INTRAVENOUS; SUBCUTANEOUS at 21:01

## 2023-02-18 RX ADMIN — Medication 30 MILLILITER(S): at 21:58

## 2023-02-18 RX ADMIN — Medication 250 MILLIGRAM(S): at 09:32

## 2023-02-18 RX ADMIN — Medication 650 MILLIGRAM(S): at 17:18

## 2023-02-18 RX ADMIN — PANTOPRAZOLE SODIUM 40 MILLIGRAM(S): 20 TABLET, DELAYED RELEASE ORAL at 07:27

## 2023-02-18 RX ADMIN — SODIUM CHLORIDE 100 MILLILITER(S): 9 INJECTION INTRAMUSCULAR; INTRAVENOUS; SUBCUTANEOUS at 03:06

## 2023-02-18 RX ADMIN — BUDESONIDE AND FORMOTEROL FUMARATE DIHYDRATE 2 PUFF(S): 160; 4.5 AEROSOL RESPIRATORY (INHALATION) at 06:12

## 2023-02-18 RX ADMIN — PIPERACILLIN AND TAZOBACTAM 25 GRAM(S): 4; .5 INJECTION, POWDER, LYOPHILIZED, FOR SOLUTION INTRAVENOUS at 14:03

## 2023-02-18 RX ADMIN — PIPERACILLIN AND TAZOBACTAM 25 GRAM(S): 4; .5 INJECTION, POWDER, LYOPHILIZED, FOR SOLUTION INTRAVENOUS at 06:12

## 2023-02-18 RX ADMIN — SODIUM CHLORIDE 100 MILLILITER(S): 9 INJECTION INTRAMUSCULAR; INTRAVENOUS; SUBCUTANEOUS at 06:21

## 2023-02-18 RX ADMIN — Medication 250 MILLIGRAM(S): at 21:02

## 2023-02-18 RX ADMIN — BUDESONIDE AND FORMOTEROL FUMARATE DIHYDRATE 2 PUFF(S): 160; 4.5 AEROSOL RESPIRATORY (INHALATION) at 17:20

## 2023-02-18 RX ADMIN — Medication 650 MILLIGRAM(S): at 16:19

## 2023-02-18 NOTE — PROGRESS NOTE ADULT - PROBLEM SELECTOR PLAN 1
per my colleague's documentation "hypotensive in ED , febrile  Unknown source of fever at this time  unlikely due to vaccine considering 48hrs out and tmax 102  broad spectrum coverage Vanc + zosyn  Consider ID in AM   have radiology read the cxr if any pna which I was not able to see considering extensive changes 2/2 lung ca"  2/18/2023 will get ct chest continue with antibx for now until culture data has resulted, f/u ct chest per my colleague's documentation "hypotensive in ED , febrile  Unknown source of fever at this time  unlikely due to vaccine considering 48hrs out and tmax 102  broad spectrum coverage Vanc + zosyn  Consider ID in AM   have radiology read the cxr if any pna which I was not able to see considering extensive changes 2/2 lung ca"  2/18/2023 will get ct chest continue with antibx for now until culture data has resulted, f/u ct chest   if culture data unrevealing one can presume fever could be secondary to shingles vaccine per CDC can have sx   2-3 days post vaccine

## 2023-02-18 NOTE — PATIENT PROFILE ADULT - FALL HARM RISK - HARM RISK INTERVENTIONS
Assistance with ambulation/Assistance OOB with selected safe patient handling equipment/Communicate Risk of Fall with Harm to all staff/Discuss with provider need for PT consult/Monitor gait and stability/Reinforce activity limits and safety measures with patient and family/Sit up slowly, dangle for a short time, stand at bedside before walking/Tailored Fall Risk Interventions/Visual Cue: Yellow wristband and red socks/Bed in lowest position, wheels locked, appropriate side rails in place/Call bell, personal items and telephone in reach/Instruct patient to call for assistance before getting out of bed or chair/Non-slip footwear when patient is out of bed/[place] to call system/Physically safe environment - no spills, clutter or unnecessary equipment/Purposeful Proactive Rounding/Room/bathroom lighting operational, light cord in reach Assistance with ambulation/Assistance OOB with selected safe patient handling equipment/Communicate Risk of Fall with Harm to all staff/Discuss with provider need for PT consult/Monitor gait and stability/Reinforce activity limits and safety measures with patient and family/Sit up slowly, dangle for a short time, stand at bedside before walking/Tailored Fall Risk Interventions/Visual Cue: Yellow wristband and red socks/Bed in lowest position, wheels locked, appropriate side rails in place/Call bell, personal items and telephone in reach/Instruct patient to call for assistance before getting out of bed or chair/Non-slip footwear when patient is out of bed/[place] to call system/Physically safe environment - no spills, clutter or unnecessary equipment/Purposeful Proactive Rounding/Room/bathroom lighting operational, light cord in reach Assistance with ambulation/Assistance OOB with selected safe patient handling equipment/Communicate Risk of Fall with Harm to all staff/Discuss with provider need for PT consult/Monitor gait and stability/Reinforce activity limits and safety measures with patient and family/Sit up slowly, dangle for a short time, stand at bedside before walking/Tailored Fall Risk Interventions/Visual Cue: Yellow wristband and red socks/Bed in lowest position, wheels locked, appropriate side rails in place/Call bell, personal items and telephone in reach/Instruct patient to call for assistance before getting out of bed or chair/Non-slip footwear when patient is out of bed/[place] to call system/Physically safe environment - no spills, clutter or unnecessary equipment/Purposeful Proactive Rounding/Room/bathroom lighting operational, light cord in reach

## 2023-02-18 NOTE — PROGRESS NOTE ADULT - SUBJECTIVE AND OBJECTIVE BOX
Patient is a 77y old  Female who presents with a chief complaint of fever in immunocompromised (2023 22:40)      OVERNIGHT EVENTS:      REVIEW OF SYSTEMS: denies chest pain/SOB, diaphoresis, no F/C, cough, dizziness, headache, blurry vision, nausea, vomiting, abdominal pain. Rest unremarkable     MEDICATIONS  (STANDING):  azithromycin  IVPB 500 milliGRAM(s) IV Intermittent once  budesonide 160 MICROgram(s)/formoterol 4.5 MICROgram(s) Inhaler 2 Puff(s) Inhalation two times a day  pantoprazole    Tablet 40 milliGRAM(s) Oral before breakfast  piperacillin/tazobactam IVPB.. 3.375 Gram(s) IV Intermittent every 8 hours  potassium chloride    Tablet ER 40 milliEquivalent(s) Oral once  rivaroxaban 20 milliGRAM(s) Oral daily  sodium chloride 0.9%. 1000 milliLiter(s) (100 mL/Hr) IV Continuous <Continuous>  vancomycin  IVPB 750 milliGRAM(s) IV Intermittent every 12 hours    MEDICATIONS  (PRN):  acetaminophen     Tablet .. 650 milliGRAM(s) Oral every 6 hours PRN Temp greater or equal to 38C (100.4F), Mild Pain (1 - 3)  albuterol    90 MICROgram(s) HFA Inhaler 2 Puff(s) Inhalation every 6 hours PRN Shortness of Breath and/or Wheezing  aluminum hydroxide/magnesium hydroxide/simethicone Suspension 30 milliLiter(s) Oral every 4 hours PRN Dyspepsia  clonazePAM Oral Disintegrating Tablet 0.25 milliGRAM(s) Oral two times a day PRN anxiety  melatonin 3 milliGRAM(s) Oral at bedtime PRN Insomnia  ondansetron Injectable 4 milliGRAM(s) IV Push every 8 hours PRN Nausea and/or Vomiting      Allergies    No Known Allergies    Intolerances        SUBJECTIVE: in bed in NAD, no acute events overnight     T(F): 98 (23 @ 11:22), Max: 102.2 (23 @ 17:17)  HR: 78 (23 @ 11:22) (66 - 99)  BP: 121/67 (23 @ 11:22) (102/50 - 121/67)  RR: 17 (23 @ 11:22) (16 - 19)  SpO2: 96% (23 @ 11:22) (95% - 98%)  Wt(kg): --    PHYSICAL EXAM:  GENERAL: NAD, well-groomed, well-developed  HEAD:  Atraumatic, Normocephalic  EYES: EOMI, PERRLA, conjunctiva and sclera clear  ENMT: No tonsillar erythema, exudates, or enlargement; Moist mucous membranes, Good dentition, No lesions  NECK: Supple,   CHEST/LUNG: Clear to  auscultation bilaterally; No rales, rhonchi, wheezing, or rubs  bilaterally  HEART: Regular rate and rhythm; No murmurs, rubs, or gallops  ABDOMEN: Soft, Nontender, Nondistended; Bowel sounds present  EXTREMITIES:  2+ Peripheral Pulses, No clubbing, cyanosis, or edema BL LE    SKIN: No rashes or lesions  NERVOUS SYSTEM:  Alert & Oriented X3, Good concentration; Motor Strength 5/5 B/L upper and lower extremities;   DTRs 2+ intact and symmetric, sensation intact BL    LABS:                        11.5   4.29  )-----------( 306      ( 2023 17:58 )             35.3         141  |  104  |  12  ----------------------------<  79  3.4<L>   |  30  |  0.45<L>    Ca    8.3<L>      2023 05:50    TPro  6.0  /  Alb  2.9<L>  /  TBili  1.0  /  DBili  x   /  AST  18  /  ALT  21  /  AlkPhos  108      PT/INR - ( 2023 17:58 )   PT: 18.5 sec;   INR: 1.54 ratio         PTT - ( 2023 17:58 )  PTT:36.0 sec  Urinalysis Basic - ( 2023 21:30 )    Color: Yellow / Appearance: Clear / S.020 / pH: x  Gluc: x / Ketone: Trace  / Bili: Negative / Urobili: Negative mg/dL   Blood: x / Protein: 15 mg/dL / Nitrite: Negative   Leuk Esterase: Negative / RBC: 3-5 /HPF / WBC 0-2   Sq Epi: x / Non Sq Epi: Occasional / Bacteria: x      Cultures;   CAPILLARY BLOOD GLUCOSE        Lipid panel:           RADIOLOGY & ADDITIONAL TESTS:      Imaging Personally Reviewed:  [ x] YES      Consultant(s) Notes Reviewed:  [x ] YES     Care Discussed with [x ] Consultants [X ] Patient [x ] Family  [x ]    [x ]  Other; RN Patient is a 77y old  Female who presents with a chief complaint of fever in immunocompromised (2023 22:40)      OVERNIGHT EVENTS:    none    MEDICATIONS  (STANDING):  azithromycin  IVPB 500 milliGRAM(s) IV Intermittent once  budesonide 160 MICROgram(s)/formoterol 4.5 MICROgram(s) Inhaler 2 Puff(s) Inhalation two times a day  pantoprazole    Tablet 40 milliGRAM(s) Oral before breakfast  piperacillin/tazobactam IVPB.. 3.375 Gram(s) IV Intermittent every 8 hours  potassium chloride    Tablet ER 40 milliEquivalent(s) Oral once  rivaroxaban 20 milliGRAM(s) Oral daily  sodium chloride 0.9%. 1000 milliLiter(s) (100 mL/Hr) IV Continuous <Continuous>  vancomycin  IVPB 750 milliGRAM(s) IV Intermittent every 12 hours    MEDICATIONS  (PRN):  acetaminophen     Tablet .. 650 milliGRAM(s) Oral every 6 hours PRN Temp greater or equal to 38C (100.4F), Mild Pain (1 - 3)  albuterol    90 MICROgram(s) HFA Inhaler 2 Puff(s) Inhalation every 6 hours PRN Shortness of Breath and/or Wheezing  aluminum hydroxide/magnesium hydroxide/simethicone Suspension 30 milliLiter(s) Oral every 4 hours PRN Dyspepsia  clonazePAM Oral Disintegrating Tablet 0.25 milliGRAM(s) Oral two times a day PRN anxiety  melatonin 3 milliGRAM(s) Oral at bedtime PRN Insomnia  ondansetron Injectable 4 milliGRAM(s) IV Push every 8 hours PRN Nausea and/or Vomiting      Allergies    No Known Allergies    Intolerances        SUBJECTIVE: in bed in NAD, no acute events overnight     T(F): 98 (23 @ 11:22), Max: 102.2 (23 @ 17:17)  HR: 78 (23 @ 11:22) (66 - 99)  BP: 121/67 (23 @ 11:22) (102/50 - 121/67)  RR: 17 (23 @ 11:22) (16 - 19)  SpO2: 96% (23 @ 11:22) (95% - 98%)  Wt(kg): --    PHYSICAL EXAM:  GENERAL: NAD, well-groomed, well-developed  HEAD:  Atraumatic, Normocephalic  EYES: EOMI, PERRLA, conjunctiva and sclera clear  ENMT: No tonsillar erythema, exudates, or enlargement; Moist mucous membranes, Good dentition, No lesions  NECK: Supple,   CHEST/LUNG: Clear to  auscultation bilaterally; No rales, rhonchi, wheezing, or rubs  bilaterally  HEART: Regular rate and rhythm; No murmurs, rubs, or gallops  ABDOMEN: Soft, Nontender, Nondistended; Bowel sounds present  EXTREMITIES:  2+ Peripheral Pulses, No clubbing, cyanosis, or edema BL LE    SKIN: No rashes or lesions  NERVOUS SYSTEM:  Alert & Oriented X3, Good concentration; Motor Strength 5/5 B/L upper and lower extremities;   DTRs 2+ intact and symmetric, sensation intact BL    LABS:                        11.5   4.29  )-----------( 306      ( 2023 17:58 )             35.3     02-18    141  |  104  |  12  ----------------------------<  79  3.4<L>   |  30  |  0.45<L>    Ca    8.3<L>      2023 05:50    TPro  6.0  /  Alb  2.9<L>  /  TBili  1.0  /  DBili  x   /  AST  18  /  ALT  21  /  AlkPhos  108  02-17    PT/INR - ( 2023 17:58 )   PT: 18.5 sec;   INR: 1.54 ratio         PTT - ( 2023 17:58 )  PTT:36.0 sec  Urinalysis Basic - ( 2023 21:30 )    Color: Yellow / Appearance: Clear / S.020 / pH: x  Gluc: x / Ketone: Trace  / Bili: Negative / Urobili: Negative mg/dL   Blood: x / Protein: 15 mg/dL / Nitrite: Negative   Leuk Esterase: Negative / RBC: 3-5 /HPF / WBC 0-2   Sq Epi: x / Non Sq Epi: Occasional / Bacteria: x      Cultures;   CAPILLARY BLOOD GLUCOSE        Lipid panel:           RADIOLOGY & ADDITIONAL TESTS:      Imaging Personally Reviewed:  [ x] YES      Consultant(s) Notes Reviewed:  [x ] YES     Care Discussed with [x ] Consultants [X ] Patient [x ] Family  [x ]    [x ]  Other; RN

## 2023-02-19 DIAGNOSIS — E87.6 HYPOKALEMIA: ICD-10-CM

## 2023-02-19 DIAGNOSIS — E83.39 OTHER DISORDERS OF PHOSPHORUS METABOLISM: ICD-10-CM

## 2023-02-19 LAB
ANION GAP SERPL CALC-SCNC: 6 MMOL/L — SIGNIFICANT CHANGE UP (ref 5–17)
BUN SERPL-MCNC: 7 MG/DL — SIGNIFICANT CHANGE UP (ref 7–23)
CALCIUM SERPL-MCNC: 8 MG/DL — LOW (ref 8.5–10.1)
CHLORIDE SERPL-SCNC: 105 MMOL/L — SIGNIFICANT CHANGE UP (ref 96–108)
CO2 SERPL-SCNC: 30 MMOL/L — SIGNIFICANT CHANGE UP (ref 22–31)
CREAT SERPL-MCNC: 0.43 MG/DL — LOW (ref 0.5–1.3)
EGFR: 101 ML/MIN/1.73M2 — SIGNIFICANT CHANGE UP
FLUAV AG NPH QL: SIGNIFICANT CHANGE UP
FLUBV AG NPH QL: SIGNIFICANT CHANGE UP
GLUCOSE SERPL-MCNC: 112 MG/DL — HIGH (ref 70–99)
HCT VFR BLD CALC: 35.4 % — SIGNIFICANT CHANGE UP (ref 34.5–45)
HCV AB S/CO SERPL IA: 0.04 S/CO — SIGNIFICANT CHANGE UP (ref 0–0.99)
HCV AB SERPL-IMP: SIGNIFICANT CHANGE UP
HGB BLD-MCNC: 11.5 G/DL — SIGNIFICANT CHANGE UP (ref 11.5–15.5)
MAGNESIUM SERPL-MCNC: 1.8 MG/DL — SIGNIFICANT CHANGE UP (ref 1.6–2.6)
MCHC RBC-ENTMCNC: 28.5 PG — SIGNIFICANT CHANGE UP (ref 27–34)
MCHC RBC-ENTMCNC: 32.5 G/DL — SIGNIFICANT CHANGE UP (ref 32–36)
MCV RBC AUTO: 87.6 FL — SIGNIFICANT CHANGE UP (ref 80–100)
NRBC # BLD: 0 /100 WBCS — SIGNIFICANT CHANGE UP (ref 0–0)
PHOSPHATE SERPL-MCNC: 2.2 MG/DL — LOW (ref 2.5–4.5)
PLATELET # BLD AUTO: 310 K/UL — SIGNIFICANT CHANGE UP (ref 150–400)
POTASSIUM SERPL-MCNC: 2.8 MMOL/L — CRITICAL LOW (ref 3.5–5.3)
POTASSIUM SERPL-SCNC: 2.8 MMOL/L — CRITICAL LOW (ref 3.5–5.3)
RBC # BLD: 4.04 M/UL — SIGNIFICANT CHANGE UP (ref 3.8–5.2)
RBC # FLD: 14.9 % — HIGH (ref 10.3–14.5)
SARS-COV-2 RNA SPEC QL NAA+PROBE: SIGNIFICANT CHANGE UP
SODIUM SERPL-SCNC: 141 MMOL/L — SIGNIFICANT CHANGE UP (ref 135–145)
VANCOMYCIN TROUGH SERPL-MCNC: 11.3 UG/ML — SIGNIFICANT CHANGE UP (ref 10–20)
WBC # BLD: 2.53 K/UL — LOW (ref 3.8–10.5)
WBC # FLD AUTO: 2.53 K/UL — LOW (ref 3.8–10.5)

## 2023-02-19 PROCEDURE — 71250 CT THORAX DX C-: CPT | Mod: 26

## 2023-02-19 PROCEDURE — 99232 SBSQ HOSP IP/OBS MODERATE 35: CPT

## 2023-02-19 RX ORDER — ATENOLOL 25 MG/1
50 TABLET ORAL DAILY
Refills: 0 | Status: DISCONTINUED | OUTPATIENT
Start: 2023-02-19 | End: 2023-02-21

## 2023-02-19 RX ORDER — POTASSIUM PHOSPHATE, MONOBASIC POTASSIUM PHOSPHATE, DIBASIC 236; 224 MG/ML; MG/ML
15 INJECTION, SOLUTION INTRAVENOUS ONCE
Refills: 0 | Status: COMPLETED | OUTPATIENT
Start: 2023-02-19 | End: 2023-02-19

## 2023-02-19 RX ORDER — POTASSIUM CHLORIDE 20 MEQ
10 PACKET (EA) ORAL
Refills: 0 | Status: COMPLETED | OUTPATIENT
Start: 2023-02-19 | End: 2023-02-19

## 2023-02-19 RX ORDER — POTASSIUM CHLORIDE 20 MEQ
40 PACKET (EA) ORAL
Refills: 0 | Status: COMPLETED | OUTPATIENT
Start: 2023-02-19 | End: 2023-02-19

## 2023-02-19 RX ORDER — CLONAZEPAM 1 MG
0.25 TABLET ORAL
Refills: 0 | Status: DISCONTINUED | OUTPATIENT
Start: 2023-02-19 | End: 2023-02-21

## 2023-02-19 RX ORDER — AMLODIPINE BESYLATE 2.5 MG/1
2.5 TABLET ORAL DAILY
Refills: 0 | Status: DISCONTINUED | OUTPATIENT
Start: 2023-02-19 | End: 2023-02-21

## 2023-02-19 RX ORDER — AMLODIPINE BESYLATE 2.5 MG/1
5 TABLET ORAL DAILY
Refills: 0 | Status: DISCONTINUED | OUTPATIENT
Start: 2023-02-19 | End: 2023-02-19

## 2023-02-19 RX ORDER — VALSARTAN 80 MG/1
160 TABLET ORAL DAILY
Refills: 0 | Status: DISCONTINUED | OUTPATIENT
Start: 2023-02-20 | End: 2023-02-21

## 2023-02-19 RX ADMIN — Medication 100 MILLIEQUIVALENT(S): at 09:02

## 2023-02-19 RX ADMIN — Medication 100 MILLIEQUIVALENT(S): at 07:10

## 2023-02-19 RX ADMIN — BUDESONIDE AND FORMOTEROL FUMARATE DIHYDRATE 2 PUFF(S): 160; 4.5 AEROSOL RESPIRATORY (INHALATION) at 17:08

## 2023-02-19 RX ADMIN — POTASSIUM PHOSPHATE, MONOBASIC POTASSIUM PHOSPHATE, DIBASIC 62.5 MILLIMOLE(S): 236; 224 INJECTION, SOLUTION INTRAVENOUS at 12:14

## 2023-02-19 RX ADMIN — Medication 250 MILLIGRAM(S): at 09:59

## 2023-02-19 RX ADMIN — RIVAROXABAN 20 MILLIGRAM(S): KIT at 12:14

## 2023-02-19 RX ADMIN — PIPERACILLIN AND TAZOBACTAM 25 GRAM(S): 4; .5 INJECTION, POWDER, LYOPHILIZED, FOR SOLUTION INTRAVENOUS at 15:54

## 2023-02-19 RX ADMIN — Medication 250 MILLIGRAM(S): at 21:28

## 2023-02-19 RX ADMIN — PIPERACILLIN AND TAZOBACTAM 25 GRAM(S): 4; .5 INJECTION, POWDER, LYOPHILIZED, FOR SOLUTION INTRAVENOUS at 05:01

## 2023-02-19 RX ADMIN — Medication 40 MILLIEQUIVALENT(S): at 08:40

## 2023-02-19 RX ADMIN — ATENOLOL 50 MILLIGRAM(S): 25 TABLET ORAL at 17:08

## 2023-02-19 RX ADMIN — Medication 40 MILLIEQUIVALENT(S): at 07:11

## 2023-02-19 RX ADMIN — Medication 650 MILLIGRAM(S): at 17:00

## 2023-02-19 RX ADMIN — PANTOPRAZOLE SODIUM 40 MILLIGRAM(S): 20 TABLET, DELAYED RELEASE ORAL at 06:16

## 2023-02-19 RX ADMIN — PIPERACILLIN AND TAZOBACTAM 25 GRAM(S): 4; .5 INJECTION, POWDER, LYOPHILIZED, FOR SOLUTION INTRAVENOUS at 22:46

## 2023-02-19 RX ADMIN — Medication 650 MILLIGRAM(S): at 16:14

## 2023-02-19 NOTE — PHYSICAL THERAPY INITIAL EVALUATION ADULT - PERTINENT HX OF CURRENT PROBLEM, REHAB EVAL
As per ED provider notes, A 76 yo F PMHx HTN, Afib on Eliquis, COPD not on home O2, lung CA on chemo/radiation, leukemia presents to ED with daughter c/o fever/chills x 2 days. Daughter also reports having noticed some disorientation/confusion over the last 5 hours. Pt 2 days sp shingles vaccine. Pt denies any specific complaint. Denies HA, dizziness, chest pain, SOB, cough, abd pain, N/V/D, urinary symptoms. Pt states she did not take any medications prior to arrival. As per ED provider notes, A 78 yo F PMHx HTN, Afib on Eliquis, COPD not on home O2, lung CA on chemo/radiation, leukemia presents to ED with daughter c/o fever/chills x 2 days. Daughter also reports having noticed some disorientation/confusion over the last 5 hours. Pt 2 days sp shingles vaccine. Pt denies any specific complaint. Denies HA, dizziness, chest pain, SOB, cough, abd pain, N/V/D, urinary symptoms. Pt states she did not take any medications prior to arrival.

## 2023-02-19 NOTE — PROVIDER CONTACT NOTE (CRITICAL VALUE NOTIFICATION) - PERSON GIVING RESULT:
[facility]-  huber mata [facility]-  huber mata [facility]-  huber mata

## 2023-02-19 NOTE — PHYSICAL THERAPY INITIAL EVALUATION ADULT - GAIT TRAINING, PT EVAL
Patient will ambulate 200-300  feet independently with appropriate assistive device as needed, in 2 weeks.

## 2023-02-19 NOTE — PHYSICAL THERAPY INITIAL EVALUATION ADULT - BALANCE TRAINING, PT EVAL
Patient will perform Independent sitting, transfers, standing and ambulation with good balance using appropriate assistive device and prevent falls.

## 2023-02-19 NOTE — PROGRESS NOTE ADULT - PROBLEM SELECTOR PLAN 1
per my colleague's documentation "hypotensive in ED , febrile  Unknown source of fever at this time  unlikely due to vaccine considering 48hrs out and tmax 102  broad spectrum coverage Vanc + zosyn  Consider ID in AM   have radiology read the cxr if any pna which I was not able to see considering extensive changes 2/2 lung ca"  2/18/2023 will get ct chest continue with antibx for now until culture data has resulted, f/u ct chest   if culture data unrevealing one can presume fever could be secondary to shingles vaccine per CDC can have sx   2-3 days post vaccine    2/19/2023-f/u ct chest and  culture data/ mrsa pcr

## 2023-02-19 NOTE — PROGRESS NOTE ADULT - NSPROGADDITIONALINFOA_GEN_ALL_CORE
d/.w daughter Babita ,  in front patient using patient's  cell phone
d/.w daughter Babita ,  in front patient using patient's  cell phone

## 2023-02-19 NOTE — PHYSICAL THERAPY INITIAL EVALUATION ADULT - ADDITIONAL COMMENTS
As per pt lives in a private house, to enter (back entrance) stairs with 3 steps & bilateral rails up wider, front entrance 2 steps with No Hand rails, Bed room at the 2nd level and 1 flight of steps  with Lt rails up. Reports to be previously independent in all mobility with occasional assistance of straight cane, denies any falls.

## 2023-02-20 LAB
ANION GAP SERPL CALC-SCNC: 5 MMOL/L — SIGNIFICANT CHANGE UP (ref 5–17)
BUN SERPL-MCNC: 3 MG/DL — LOW (ref 7–23)
CALCIUM SERPL-MCNC: 8.8 MG/DL — SIGNIFICANT CHANGE UP (ref 8.5–10.1)
CHLORIDE SERPL-SCNC: 105 MMOL/L — SIGNIFICANT CHANGE UP (ref 96–108)
CO2 SERPL-SCNC: 30 MMOL/L — SIGNIFICANT CHANGE UP (ref 22–31)
CREAT SERPL-MCNC: 0.43 MG/DL — LOW (ref 0.5–1.3)
EGFR: 101 ML/MIN/1.73M2 — SIGNIFICANT CHANGE UP
GLUCOSE SERPL-MCNC: 95 MG/DL — SIGNIFICANT CHANGE UP (ref 70–99)
HCT VFR BLD CALC: 35.1 % — SIGNIFICANT CHANGE UP (ref 34.5–45)
HGB BLD-MCNC: 11.6 G/DL — SIGNIFICANT CHANGE UP (ref 11.5–15.5)
LEGIONELLA AG UR QL: NEGATIVE — SIGNIFICANT CHANGE UP
MAGNESIUM SERPL-MCNC: 1.9 MG/DL — SIGNIFICANT CHANGE UP (ref 1.6–2.6)
MCHC RBC-ENTMCNC: 28.8 PG — SIGNIFICANT CHANGE UP (ref 27–34)
MCHC RBC-ENTMCNC: 33 G/DL — SIGNIFICANT CHANGE UP (ref 32–36)
MCV RBC AUTO: 87.1 FL — SIGNIFICANT CHANGE UP (ref 80–100)
MRSA PCR RESULT.: SIGNIFICANT CHANGE UP
NRBC # BLD: 0 /100 WBCS — SIGNIFICANT CHANGE UP (ref 0–0)
PHOSPHATE SERPL-MCNC: 2.8 MG/DL — SIGNIFICANT CHANGE UP (ref 2.5–4.5)
PLATELET # BLD AUTO: 401 K/UL — HIGH (ref 150–400)
POTASSIUM SERPL-MCNC: 3.5 MMOL/L — SIGNIFICANT CHANGE UP (ref 3.5–5.3)
POTASSIUM SERPL-SCNC: 3.5 MMOL/L — SIGNIFICANT CHANGE UP (ref 3.5–5.3)
RBC # BLD: 4.03 M/UL — SIGNIFICANT CHANGE UP (ref 3.8–5.2)
RBC # FLD: 14.9 % — HIGH (ref 10.3–14.5)
S AUREUS DNA NOSE QL NAA+PROBE: SIGNIFICANT CHANGE UP
SODIUM SERPL-SCNC: 140 MMOL/L — SIGNIFICANT CHANGE UP (ref 135–145)
VANCOMYCIN TROUGH SERPL-MCNC: 6.9 UG/ML — LOW (ref 10–20)
VANCOMYCIN TROUGH SERPL-MCNC: 7.6 UG/ML — LOW (ref 10–20)
WBC # BLD: 2.92 K/UL — LOW (ref 3.8–10.5)
WBC # FLD AUTO: 2.92 K/UL — LOW (ref 3.8–10.5)

## 2023-02-20 PROCEDURE — 99232 SBSQ HOSP IP/OBS MODERATE 35: CPT

## 2023-02-20 PROCEDURE — 93306 TTE W/DOPPLER COMPLETE: CPT | Mod: 26

## 2023-02-20 RX ORDER — AZITHROMYCIN 500 MG/1
500 TABLET, FILM COATED ORAL ONCE
Refills: 0 | Status: COMPLETED | OUTPATIENT
Start: 2023-02-20 | End: 2023-02-20

## 2023-02-20 RX ORDER — AZITHROMYCIN 500 MG/1
TABLET, FILM COATED ORAL
Refills: 0 | Status: DISCONTINUED | OUTPATIENT
Start: 2023-02-20 | End: 2023-02-21

## 2023-02-20 RX ORDER — POTASSIUM CHLORIDE 20 MEQ
40 PACKET (EA) ORAL ONCE
Refills: 0 | Status: COMPLETED | OUTPATIENT
Start: 2023-02-20 | End: 2023-02-20

## 2023-02-20 RX ORDER — LACTOBACILLUS ACIDOPHILUS 100MM CELL
1 CAPSULE ORAL EVERY 12 HOURS
Refills: 0 | Status: DISCONTINUED | OUTPATIENT
Start: 2023-02-20 | End: 2023-02-21

## 2023-02-20 RX ORDER — AZITHROMYCIN 500 MG/1
500 TABLET, FILM COATED ORAL EVERY 24 HOURS
Refills: 0 | Status: DISCONTINUED | OUTPATIENT
Start: 2023-02-21 | End: 2023-02-21

## 2023-02-20 RX ADMIN — ALBUTEROL 2 PUFF(S): 90 AEROSOL, METERED ORAL at 11:11

## 2023-02-20 RX ADMIN — Medication 0.25 MILLIGRAM(S): at 17:35

## 2023-02-20 RX ADMIN — BUDESONIDE AND FORMOTEROL FUMARATE DIHYDRATE 2 PUFF(S): 160; 4.5 AEROSOL RESPIRATORY (INHALATION) at 05:38

## 2023-02-20 RX ADMIN — BUDESONIDE AND FORMOTEROL FUMARATE DIHYDRATE 2 PUFF(S): 160; 4.5 AEROSOL RESPIRATORY (INHALATION) at 17:32

## 2023-02-20 RX ADMIN — RIVAROXABAN 20 MILLIGRAM(S): KIT at 11:12

## 2023-02-20 RX ADMIN — Medication 650 MILLIGRAM(S): at 07:50

## 2023-02-20 RX ADMIN — PANTOPRAZOLE SODIUM 40 MILLIGRAM(S): 20 TABLET, DELAYED RELEASE ORAL at 05:35

## 2023-02-20 RX ADMIN — Medication 1 TABLET(S): at 11:12

## 2023-02-20 RX ADMIN — Medication 40 MILLIEQUIVALENT(S): at 05:36

## 2023-02-20 RX ADMIN — PIPERACILLIN AND TAZOBACTAM 25 GRAM(S): 4; .5 INJECTION, POWDER, LYOPHILIZED, FOR SOLUTION INTRAVENOUS at 13:48

## 2023-02-20 RX ADMIN — PIPERACILLIN AND TAZOBACTAM 25 GRAM(S): 4; .5 INJECTION, POWDER, LYOPHILIZED, FOR SOLUTION INTRAVENOUS at 05:35

## 2023-02-20 RX ADMIN — Medication 250 MILLIGRAM(S): at 09:03

## 2023-02-20 RX ADMIN — AMLODIPINE BESYLATE 2.5 MILLIGRAM(S): 2.5 TABLET ORAL at 05:35

## 2023-02-20 RX ADMIN — VALSARTAN 160 MILLIGRAM(S): 80 TABLET ORAL at 05:35

## 2023-02-20 RX ADMIN — ATENOLOL 50 MILLIGRAM(S): 25 TABLET ORAL at 05:35

## 2023-02-20 RX ADMIN — PIPERACILLIN AND TAZOBACTAM 25 GRAM(S): 4; .5 INJECTION, POWDER, LYOPHILIZED, FOR SOLUTION INTRAVENOUS at 21:33

## 2023-02-20 RX ADMIN — AZITHROMYCIN 500 MILLIGRAM(S): 500 TABLET, FILM COATED ORAL at 13:48

## 2023-02-20 RX ADMIN — Medication 650 MILLIGRAM(S): at 17:33

## 2023-02-20 RX ADMIN — Medication 1 TABLET(S): at 17:32

## 2023-02-20 RX ADMIN — Medication 250 MILLIGRAM(S): at 21:34

## 2023-02-20 RX ADMIN — Medication 40 MILLIEQUIVALENT(S): at 11:12

## 2023-02-20 NOTE — PROGRESS NOTE ADULT - SUBJECTIVE AND OBJECTIVE BOX
Patient is a 77y old  Female who presents with a chief complaint of fever in immunocompromised (17 Feb 2023 22:40)      OVERNIGHT EVENTS:  had one episode of diarrhea yesterday        Allergies    No Known Allergies    Intolerances        SUBJECTIVE: in bed in NAD,     Vital Signs Last 24 Hrs  T(C): 37.5 (20 Feb 2023 04:55), Max: 38.2 (19 Feb 2023 16:27)  T(F): 99.5 (20 Feb 2023 04:55), Max: 100.8 (19 Feb 2023 16:27)  HR: 80 (20 Feb 2023 10:00) (77 - 115)  BP: 104/54 (20 Feb 2023 09:50) (104/54 - 163/75)  BP(mean): 96 (19 Feb 2023 21:25) (96 - 96)  RR: 19 (20 Feb 2023 10:05) (15 - 19)  SpO2: 94% (20 Feb 2023 10:05) (87% - 96%)    Parameters below as of 20 Feb 2023 10:05  Patient On (Oxygen Delivery Method): nasal cannula  O2 Flow (L/min): 2      PHYSICAL EXAM:  GENERAL: NAD, well-groomed, well-developed  HEAD:  Atraumatic, Normocephalic  EYES: EOMI, PERRLA, conjunctiva and sclera clear  ENMT: No tonsillar erythema, exudates, or enlargement; Moist mucous membranes, Good dentition, No lesions  NECK: Supple,   CHEST/LUNG: Clear to  auscultation bilaterally; No rales, rhonchi, wheezing, or rubs  bilaterally  HEART: Regular rate and rhythm; No murmurs, rubs, or gallops  ABDOMEN: Soft, Nontender, Nondistended; Bowel sounds present  EXTREMITIES:  2+ Peripheral Pulses, No clubbing, cyanosis, or edema BL LE  SKIN: No rashes or lesions  NERVOUS SYSTEM:  Alert & Oriented X3, Good concentration; Motor Strength 5/5 B/L upper and lower extremities;   DTRs 2+ intact and symmetric, sensation intact BL    LABS:                                               11.6   2.92  )-----------( 401      ( 20 Feb 2023 06:09 )             35.1   02-20    140  |  105  |  3<L>  ----------------------------<  95  3.5   |  30  |  0.43<L>    Ca    8.8      20 Feb 2023 06:09  Phos  2.8     02-20  Mg     1.9     02-20          Cultures;   CAPILLARY BLOOD GLUCOSE        Lipid panel:           RADIOLOGY & ADDITIONAL TESTS:      Imaging Personally Reviewed:  [ x] YES      Consultant(s) Notes Reviewed:  [x ] YES     Care Discussed with [x ] Consultants [X ] Patient [x ] Family  [x ]    [x ]  Other; RN

## 2023-02-20 NOTE — PROGRESS NOTE ADULT - PROBLEM SELECTOR PLAN 1
per my colleague's documentation "hypotensive in ED , febrile  Unknown source of fever at this time  unlikely due to vaccine considering 48hrs out and tmax 102  broad spectrum coverage Vanc + zosyn  Consider ID in AM   have radiology read the cxr if any pna which I was not able to see considering extensive changes 2/2 lung ca"  2/18/2023 will get ct chest continue with antibx for now until culture data has resulted, f/u ct chest   if culture data unrevealing one can presume fever could be secondary to shingles vaccine per CDC can have sx   2-3 days post vaccine    2/19/2023-f/u ct chest and  culture data/ mrsa pcr  2/20/2023 ct chest as above , reviewed prior outpt oncology notes and I appears lung cancer is in similar area difficult to determine if what is seen on ct chest id tumor and infection, due to fever will presumes both and continue with antibx   obtained oxygen saturation  and it does appear patient may need  home oxygen  will d/w CM   also called her oncologist per her request dr. mariana de jesus  office is closed per my colleague's documentation "hypotensive in ED , febrile  Unknown source of fever at this time  unlikely due to vaccine considering 48hrs out and tmax 102  broad spectrum coverage Vanc + zosyn  Consider ID in AM   have radiology read the cxr if any pna which I was not able to see considering extensive changes 2/2 lung ca"  2/18/2023 will get ct chest continue with antibx for now until culture data has resulted, f/u ct chest   if culture data unrevealing one can presume fever could be secondary to shingles vaccine per CDC can have sx   2-3 days post vaccine    2/19/2023-f/u ct chest and  culture data/ mrsa pcr  2/20/2023 ct chest as above , reviewed prior outpt oncology notes and I appears lung cancer is in similar area difficult to determine if what is seen on ct chest id tumor and infection, due to fever will presumes both and continue with antibx   obtained oxygen saturation  and it does appear patient may need  home oxygen  will d/w CM   also called her oncologist per her request dr. mariana de jesus  office is closed     vanco trough low but was drawn at wrong time will repeat for 8pm today if low increase  dose of vanco to 1g q12.

## 2023-02-20 NOTE — PROGRESS NOTE ADULT - ASSESSMENT
76 yo F PMHx HTN, Afib on Eliquis, COPD not on home O2, lung CA on chemo/radiation, leukemia presents to ED with daughter c/o fever/chills x 2 days. Denies any specific complaints. last chemo and radiation last week.  78 yo F PMHx HTN, Afib on Eliquis, COPD not on home O2, lung CA on chemo/radiation, leukemia presents to ED with daughter c/o fever/chills x 2 days. Denies any specific complaints. last chemo and radiation last week.

## 2023-02-21 ENCOUNTER — TRANSCRIPTION ENCOUNTER (OUTPATIENT)
Age: 78
End: 2023-02-21

## 2023-02-21 VITALS
RESPIRATION RATE: 18 BRPM | SYSTOLIC BLOOD PRESSURE: 153 MMHG | HEART RATE: 93 BPM | TEMPERATURE: 99 F | OXYGEN SATURATION: 98 % | DIASTOLIC BLOOD PRESSURE: 73 MMHG

## 2023-02-21 DIAGNOSIS — J96.01 ACUTE RESPIRATORY FAILURE WITH HYPOXIA: ICD-10-CM

## 2023-02-21 LAB
ANION GAP SERPL CALC-SCNC: 7 MMOL/L — SIGNIFICANT CHANGE UP (ref 5–17)
BUN SERPL-MCNC: 5 MG/DL — LOW (ref 7–23)
CALCIUM SERPL-MCNC: 8.5 MG/DL — SIGNIFICANT CHANGE UP (ref 8.5–10.1)
CHLORIDE SERPL-SCNC: 103 MMOL/L — SIGNIFICANT CHANGE UP (ref 96–108)
CO2 SERPL-SCNC: 29 MMOL/L — SIGNIFICANT CHANGE UP (ref 22–31)
CREAT SERPL-MCNC: 0.59 MG/DL — SIGNIFICANT CHANGE UP (ref 0.5–1.3)
EGFR: 93 ML/MIN/1.73M2 — SIGNIFICANT CHANGE UP
GLUCOSE SERPL-MCNC: 115 MG/DL — HIGH (ref 70–99)
MAGNESIUM SERPL-MCNC: 2 MG/DL — SIGNIFICANT CHANGE UP (ref 1.6–2.6)
PHOSPHATE SERPL-MCNC: 3.2 MG/DL — SIGNIFICANT CHANGE UP (ref 2.5–4.5)
POTASSIUM SERPL-MCNC: 3.3 MMOL/L — LOW (ref 3.5–5.3)
POTASSIUM SERPL-SCNC: 3.3 MMOL/L — LOW (ref 3.5–5.3)
SODIUM SERPL-SCNC: 139 MMOL/L — SIGNIFICANT CHANGE UP (ref 135–145)

## 2023-02-21 PROCEDURE — 99239 HOSP IP/OBS DSCHRG MGMT >30: CPT

## 2023-02-21 RX ORDER — ALBUTEROL 90 UG/1
2 AEROSOL, METERED ORAL
Qty: 1 | Refills: 0
Start: 2023-02-21 | End: 2023-03-22

## 2023-02-21 RX ORDER — IPRATROPIUM/ALBUTEROL SULFATE 18-103MCG
3 AEROSOL WITH ADAPTER (GRAM) INHALATION
Qty: 360 | Refills: 0
Start: 2023-02-21 | End: 2023-03-22

## 2023-02-21 RX ORDER — VALSARTAN 80 MG/1
1 TABLET ORAL
Qty: 30 | Refills: 0
Start: 2023-02-21 | End: 2023-03-22

## 2023-02-21 RX ORDER — CEFPODOXIME PROXETIL 100 MG
1 TABLET ORAL
Qty: 6 | Refills: 0
Start: 2023-02-21 | End: 2023-02-23

## 2023-02-21 RX ORDER — LACTOBACILLUS ACIDOPHILUS 100MM CELL
1 CAPSULE ORAL
Qty: 10 | Refills: 0
Start: 2023-02-21 | End: 2023-02-25

## 2023-02-21 RX ORDER — AMLODIPINE BESYLATE 2.5 MG/1
1 TABLET ORAL
Qty: 0 | Refills: 0 | DISCHARGE
Start: 2023-02-21

## 2023-02-21 RX ADMIN — Medication 250 MILLIGRAM(S): at 08:24

## 2023-02-21 RX ADMIN — Medication 0.25 MILLIGRAM(S): at 16:37

## 2023-02-21 RX ADMIN — BUDESONIDE AND FORMOTEROL FUMARATE DIHYDRATE 2 PUFF(S): 160; 4.5 AEROSOL RESPIRATORY (INHALATION) at 08:25

## 2023-02-21 RX ADMIN — PANTOPRAZOLE SODIUM 40 MILLIGRAM(S): 20 TABLET, DELAYED RELEASE ORAL at 06:18

## 2023-02-21 RX ADMIN — AMLODIPINE BESYLATE 2.5 MILLIGRAM(S): 2.5 TABLET ORAL at 05:24

## 2023-02-21 RX ADMIN — Medication 650 MILLIGRAM(S): at 08:32

## 2023-02-21 RX ADMIN — RIVAROXABAN 20 MILLIGRAM(S): KIT at 11:03

## 2023-02-21 RX ADMIN — PIPERACILLIN AND TAZOBACTAM 25 GRAM(S): 4; .5 INJECTION, POWDER, LYOPHILIZED, FOR SOLUTION INTRAVENOUS at 05:25

## 2023-02-21 RX ADMIN — PIPERACILLIN AND TAZOBACTAM 25 GRAM(S): 4; .5 INJECTION, POWDER, LYOPHILIZED, FOR SOLUTION INTRAVENOUS at 13:24

## 2023-02-21 RX ADMIN — VALSARTAN 160 MILLIGRAM(S): 80 TABLET ORAL at 05:24

## 2023-02-21 RX ADMIN — ATENOLOL 50 MILLIGRAM(S): 25 TABLET ORAL at 05:25

## 2023-02-21 RX ADMIN — Medication 1 TABLET(S): at 05:24

## 2023-02-21 NOTE — PROGRESS NOTE ADULT - PROBLEM SELECTOR PLAN 7
will need home oxygen  in  flowsheet from 2/20/2023 d/w sw/cm on 2/202/2023 to start home oxygen process   reiterated  again today in IDR

## 2023-02-21 NOTE — DISCHARGE NOTE PROVIDER - HOSPITAL COURSE
76 yo F PMHx HTN, Afib on Eliquis, COPD not on home O2, lung CA on chemo/radiation, leukemia presents to ED with daughter c/o fever/chills x 2 days. Denies any specific complaints. last chemo and radiation last week.      Problem/Plan - 1:  ·  Problem: Sepsis, unspecified organism.   ·  Plan: per my colleague's documentation "hypotensive in ED , febrile  Unknown source of fever at this time  unlikely due to vaccine considering 48hrs out and tmax 102  broad spectrum coverage Vanc + zosyn  Consider ID in AM   have radiology read the cxr if any pna which I was not able to see considering extensive changes 2/2 lung ca"  2/18/2023 will get ct chest continue with antibx for now until culture data has resulted, f/u ct chest   if culture data unrevealing one can presume fever could be secondary to shingles vaccine per CDC can have sx   2-3 days post vaccine    2/19/2023-f/u ct chest and  culture data/ mrsa pcr  2/20/2023 ct chest as above , reviewed prior outpt oncology notes and I appears lung cancer is in similar area difficult to determine if what is seen on ct chest id tumor and infection, due to fever will presumes both and continue with antibx   obtained oxygen saturation  and it does appear patient may need  home oxygen  will d/w CM   also called her oncologist per her request dr. mariana de jesus  office is closed     vanco trough low but was drawn at wrong time will repeat for 8pm today if low increase  dose of vanco to 1g q12.    2/21/2023 mrsa swab negative - low concern for stap pna- stop vanco   legionella negative stop zithromax.     Problem/Plan - 2:  ·  Problem: Lung cancer.   ·  Plan: - last chemo last week.     Problem/Plan - 3:  ·  Problem: Atrial fibrillation.   ·  Plan: xarelto   rate controlled.     Problem/Plan - 4:  ·  Problem: COPD (chronic obstructive pulmonary disease).   ·  Plan: symbicort in place of trelogy  albuterol PRN.     Problem/Plan - 5:  ·  Problem: Hypokalemia.   ·  Plan: resolved.     Problem/Plan - 6:  ·  Problem: Hypophosphatemia.   ·  Plan: resolved.     Problem/Plan - 7:  ·  Problem: Acute respiratory failure with hypoxia.   ·  Plan: will need home oxygen  in  flowsheet from 2/20/2023 d/w sw/cm on 2/202/2023 to start home oxygen process   78 yo F PMHx HTN, Afib on Eliquis, COPD not on home O2, lung CA on chemo/radiation, leukemia presents to ED with daughter c/o fever/chills x 2 days. Denies any specific complaints. last chemo and radiation last week.      Problem/Plan - 1:  ·  Problem: Sepsis, unspecified organism.   ·  Plan: per my colleague's documentation "hypotensive in ED , febrile  Unknown source of fever at this time  unlikely due to vaccine considering 48hrs out and tmax 102  broad spectrum coverage Vanc + zosyn  Consider ID in AM   have radiology read the cxr if any pna which I was not able to see considering extensive changes 2/2 lung ca"  2/18/2023 will get ct chest continue with antibx for now until culture data has resulted, f/u ct chest   if culture data unrevealing one can presume fever could be secondary to shingles vaccine per CDC can have sx   2-3 days post vaccine    2/19/2023-f/u ct chest and  culture data/ mrsa pcr  2/20/2023 ct chest as above , reviewed prior outpt oncology notes and I appears lung cancer is in similar area difficult to determine if what is seen on ct chest id tumor and infection, due to fever will presumes both and continue with antibx   obtained oxygen saturation  and it does appear patient may need  home oxygen  will d/w CM   also called her oncologist per her request dr. mariana de jesus  office is closed     vanco trough low but was drawn at wrong time will repeat for 8pm today if low increase  dose of vanco to 1g q12.    2/21/2023 mrsa swab negative - low concern for stap pna- stop vanco   legionella negative stop zithromax.     Problem/Plan - 2:  ·  Problem: Lung cancer.   ·  Plan: - last chemo last week.     Problem/Plan - 3:  ·  Problem: Atrial fibrillation.   ·  Plan: xarelto   rate controlled.     Problem/Plan - 4:  ·  Problem: COPD (chronic obstructive pulmonary disease).   ·  Plan: symbicort in place of trelogy  albuterol PRN.     Problem/Plan - 5:  ·  Problem: Hypokalemia.   ·  Plan: resolved.     Problem/Plan - 6:  ·  Problem: Hypophosphatemia.   ·  Plan: resolved.     Problem/Plan - 7:  ·  Problem: Acute respiratory failure with hypoxia.   ·  Plan: will need home oxygen  in  flowsheet from 2/20/2023 d/w sw/cm on 2/202/2023 to start home oxygen process

## 2023-02-21 NOTE — DISCHARGE NOTE NURSING/CASE MANAGEMENT/SOCIAL WORK - NSDCPEFALRISK_GEN_ALL_CORE
For information on Fall & Injury Prevention, visit: [email or web address]/[email or web address]-[email or web address]-[email or web address]-health-and-mobility OR  [email or web address]/[email or web address]-prevention-tips-[email or web address]-injury OR  https://www.cdc.gov/steadi/patient.html For information on Fall & Injury Prevention, visit: [email or web address]://www.[facility]/news/fall-prevention-protects-[email or web address]-maintains-health-and-mobility OR  https://www.[facility]/news/fall-prevention-tips-to-avoid-injury OR  https://www.cdc.gov/steadi/patient.html For information on Fall & Injury Prevention, visit: https://www.[facility]/news/fall-prevention-protects-and-maintains-health-and-mobility OR  https://www.[facility]/news/fall-prevention-tips-to-avoid-injury OR  https://www.cdc.gov/steadi/patient.html

## 2023-02-21 NOTE — PROGRESS NOTE ADULT - PROBLEM SELECTOR PLAN 1
per my colleague's documentation "hypotensive in ED , febrile  Unknown source of fever at this time  unlikely due to vaccine considering 48hrs out and tmax 102  broad spectrum coverage Vanc + zosyn  Consider ID in AM   have radiology read the cxr if any pna which I was not able to see considering extensive changes 2/2 lung ca"  2/18/2023 will get ct chest continue with antibx for now until culture data has resulted, f/u ct chest   if culture data unrevealing one can presume fever could be secondary to shingles vaccine per CDC can have sx   2-3 days post vaccine    2/19/2023-f/u ct chest and  culture data/ mrsa pcr  2/20/2023 ct chest as above , reviewed prior outpt oncology notes and I appears lung cancer is in similar area difficult to determine if what is seen on ct chest id tumor and infection, due to fever will presumes both and continue with antibx   obtained oxygen saturation  and it does appear patient may need  home oxygen  will d/w CM   also called her oncologist per her request dr. peña   office is closed     vanco trough low but was drawn at wrong time will repeat for 8pm today if low increase  dose of vanco to 1g q12.    2/21/2023 mrsa swab negative - low concern for stap pna- stop vanco   legionella negative stop zithromax

## 2023-02-21 NOTE — DISCHARGE NOTE PROVIDER - NSDCCPCAREPLAN_GEN_ALL_CORE_FT
PRINCIPAL DISCHARGE DIAGNOSIS  Diagnosis: Fever  Assessment and Plan of Treatment: Resolved. Workup has remained negative thus far. Could possibly be related to recent shingles vaccine. No positive cultures obtained during admission. Follow up with your Oncologist and Primary Care provider within 1 week.       PRINCIPAL DISCHARGE DIAGNOSIS  Diagnosis: Sepsis, unspecified organism  Assessment and Plan of Treatment:   < end of copied text >  Degenerative changes of the spine are noted.  IMPRESSION: Patchy opacities within the right upper/right lower lobes as   well as small bilateral pleural effusions are noted. Primary differential   diagnostic consideration includes pulmonary edema.  5 cm predominately calcified mass in the right upper lobe isof uncertain   etiology.< from: CT Chest No Cont (02.19.23 @ 09:02) >  presumed pna, blood cx negtaive , covid and rvp negative        SECONDARY DISCHARGE DIAGNOSES  Diagnosis: COPD (chronic obstructive pulmonary disease)  Assessment and Plan of Treatment:     Diagnosis: Lung cancer  Assessment and Plan of Treatment:     Diagnosis: Atrial fibrillation  Assessment and Plan of Treatment:     Diagnosis: Hypokalemia  Assessment and Plan of Treatment:     Diagnosis: Acute respiratory failure with hypoxia  Assessment and Plan of Treatment: need home oxygen

## 2023-02-21 NOTE — PROGRESS NOTE ADULT - REASON FOR ADMISSION
fever in immunocompromised

## 2023-02-21 NOTE — DISCHARGE NOTE PROVIDER - CARE PROVIDER_API CALL
PCP, Oncologist  Follow up with your PCP and Oncologist within 1 week.  Phone: (   )    -  Fax: (   )    -  Follow Up Time:

## 2023-02-21 NOTE — CHART NOTE - NSCHARTNOTEFT_GEN_A_CORE
Patient is being discharged to home on oxygen. Patient is in a chronic stable state with copd, lung cancer.  On room air, resting spo2 is 88% and ambulating spo2 is 87%.   On oxygen, resting spo2 is 94%.  Patient is currently on 2L via nasal cannula. Patient is mobile within the home and needs portability.

## 2023-02-21 NOTE — DISCHARGE NOTE PROVIDER - NSDCMRMEDTOKEN_GEN_ALL_CORE_FT
albuterol 90 mcg/inh inhalation powder:   amLODIPine 5 mg oral tablet: 1 tab(s) orally once a day  atenolol 50 mg oral tablet: 1 tab(s) orally once a day  clonazePAM 0.25 mg oral tablet, disintegrating: orally 2 times a day, As Needed  Eliquis 5 mg oral tablet: 1 tab(s) orally 2 times a day  OMEPRAZOLE DR 20 MG CAPSULE: TAKE 1 CAPSULE BY MOUTH EVERY DAY FOR 30 DAYS  ondansetron 8 mg oral tablet: 1 tab(s) orally 3 times a day  Trelegy Ellipta 100 mcg-62.5 mcg-25 mcg/inh inhalation powder: 1 puff(s) inhaled once a day  TRELEGY ELLIPTA 100-62.5-25: INHALE 1 PUFF BY MOUTH EVERY DAY  valsartan-hydrochlorothiazide 160 mg-12.5 mg oral tablet: 1 tab(s) orally once a day  XARELTO 20 MG TABLET: TAKE 1 TABLET BY MOUTH EVERY DAY   Albuterol (Eqv-ProAir HFA) 90 mcg/inh inhalation aerosol: 2 puff(s) inhaled every 6 hours, As Needed   amLODIPine 2.5 mg oral tablet: 1 tab(s) orally once a day  atenolol 50 mg oral tablet: 1 tab(s) orally once a day  clonazePAM 0.25 mg oral tablet, disintegrating: orally 2 times a day, As Needed  lactobacillus acidophilus oral capsule: 1 cap(s) orally 2 times a day   OMEPRAZOLE DR 20 MG CAPSULE: TAKE 1 CAPSULE BY MOUTH EVERY DAY FOR 30 DAYS  ondansetron 8 mg oral tablet: 1 tab(s) orally 3 times a day, As Needed  Trelegy Ellipta 100 mcg-62.5 mcg-25 mcg/inh inhalation powder: 1 puff(s) inhaled once a day  valsartan 160 mg oral tablet: 1 tab(s) orally once a day  XARELTO 20 MG TABLET: 1 each orally once a day   Albuterol (Eqv-ProAir HFA) 90 mcg/inh inhalation aerosol: 2 puff(s) inhaled every 6 hours, As Needed   amLODIPine 2.5 mg oral tablet: 1 tab(s) orally once a day  atenolol 50 mg oral tablet: 1 tab(s) orally once a day  cefpodoxime 200 mg oral tablet: 1 tab(s) orally 2 times a day   clonazePAM 0.25 mg oral tablet, disintegrating: orally 2 times a day, As Needed  lactobacillus acidophilus oral capsule: 1 cap(s) orally 2 times a day   OMEPRAZOLE DR 20 MG CAPSULE: TAKE 1 CAPSULE BY MOUTH EVERY DAY FOR 30 DAYS  ondansetron 8 mg oral tablet: 1 tab(s) orally 3 times a day, As Needed  Trelegy Ellipta 100 mcg-62.5 mcg-25 mcg/inh inhalation powder: 1 puff(s) inhaled once a day  valsartan 160 mg oral tablet: 1 tab(s) orally once a day  XARELTO 20 MG TABLET: 1 each orally once a day   Albuterol (Eqv-ProAir HFA) 90 mcg/inh inhalation aerosol: 2 puff(s) inhaled every 6 hours, As Needed   amLODIPine 2.5 mg oral tablet: 1 tab(s) orally once a day  atenolol 50 mg oral tablet: 1 tab(s) orally once a day  cefpodoxime 200 mg oral tablet: 1 tab(s) orally 2 times a day   clonazePAM 0.25 mg oral tablet, disintegrating: orally 2 times a day, As Needed  ipratropium-albuterol 0.5 mg-2.5 mg/3 mL inhalation solution: 3 milliliter(s) by nebulizer every 6 hours, As Needed -for shortness of breath and/or wheezing   lactobacillus acidophilus oral capsule: 1 cap(s) orally 2 times a day   nebulizer machine: 1   OMEPRAZOLE DR 20 MG CAPSULE: TAKE 1 CAPSULE BY MOUTH EVERY DAY FOR 30 DAYS  ondansetron 8 mg oral tablet: 1 tab(s) orally 3 times a day, As Needed  Trelegy Ellipta 100 mcg-62.5 mcg-25 mcg/inh inhalation powder: 1 puff(s) inhaled once a day  valsartan 160 mg oral tablet: 1 tab(s) orally once a day  XARELTO 20 MG TABLET: 1 each orally once a day   Albuterol (Eqv-ProAir HFA) 90 mcg/inh inhalation aerosol: 2 puff(s) inhaled every 6 hours, As Needed   amLODIPine 2.5 mg oral tablet: 1 tab(s) orally once a day  atenolol 50 mg oral tablet: 1 tab(s) orally once a day  cefpodoxime 200 mg oral tablet: 1 tab(s) orally 2 times a day   clonazePAM 0.25 mg oral tablet, disintegrating: orally 2 times a day, As Needed  ipratropium-albuterol 0.5 mg-2.5 mg/3 mL inhalation solution: 3 milliliter(s) by nebulizer every 6 hours, As Needed -for shortness of breath and/or wheezing   lactobacillus acidophilus oral capsule: 1 cap(s) orally 2 times a day   nebulizer machine: 1   OMEPRAZOLE DR 20 MG CAPSULE: TAKE 1 CAPSULE BY MOUTH EVERY DAY FOR 30 DAYS  ondansetron 8 mg oral tablet: 1 tab(s) orally 3 times a day, As Needed  outpatient physical therapy : weakness  Trelegy Ellipta 100 mcg-62.5 mcg-25 mcg/inh inhalation powder: 1 puff(s) inhaled once a day  valsartan 160 mg oral tablet: 1 tab(s) orally once a day  XARELTO 20 MG TABLET: 1 each orally once a day

## 2023-02-21 NOTE — DISCHARGE NOTE NURSING/CASE MANAGEMENT/SOCIAL WORK - PATIENT PORTAL LINK FT
You can access the FollowMyHealth Patient Portal offered by [facility] by registering at the following website: [email or web address]. By joining [employer]’s FollowMyHealth portal, you will also be able to view your health information using other applications (apps) compatible with our system. You can access the FollowMyHealth Patient Portal offered by [facility] by registering at the following website: [email or web address]. By joining [employer]’s FollowMyHealth portal, you will also be able to view your health information using other applications (apps) compatible with our system. You can access the FollowMyHealth Patient Portal offered by [facility] by registering at the following website: [email or web address]. By joining [employer]’s FollowMyHealth portal, you will also be able to view your health information using other applications (apps) compatible with our system.

## 2023-02-21 NOTE — PROGRESS NOTE ADULT - SUBJECTIVE AND OBJECTIVE BOX
Patient is a 77y old  Female who presents with a chief complaint of fever in immunocompromised (17 Feb 2023 22:40)      OVERNIGHT EVENTS:  none          MEDICATIONS  (STANDING):  amLODIPine   Tablet 2.5 milliGRAM(s) Oral daily  atenolol  Tablet 50 milliGRAM(s) Oral daily  azithromycin  IVPB      azithromycin  IVPB 500 milliGRAM(s) IV Intermittent every 24 hours  budesonide 160 MICROgram(s)/formoterol 4.5 MICROgram(s) Inhaler 2 Puff(s) Inhalation two times a day  lactobacillus acidophilus 1 Tablet(s) Oral every 12 hours  pantoprazole    Tablet 40 milliGRAM(s) Oral before breakfast  piperacillin/tazobactam IVPB.. 3.375 Gram(s) IV Intermittent every 8 hours  rivaroxaban 20 milliGRAM(s) Oral daily  valsartan 160 milliGRAM(s) Oral daily  vancomycin  IVPB 750 milliGRAM(s) IV Intermittent every 12 hours    MEDICATIONS  (PRN):  acetaminophen     Tablet .. 650 milliGRAM(s) Oral every 6 hours PRN Temp greater or equal to 38C (100.4F), Mild Pain (1 - 3)  albuterol    90 MICROgram(s) HFA Inhaler 2 Puff(s) Inhalation every 6 hours PRN Shortness of Breath and/or Wheezing  aluminum hydroxide/magnesium hydroxide/simethicone Suspension 30 milliLiter(s) Oral every 4 hours PRN Dyspepsia  clonazePAM  Tablet 0.25 milliGRAM(s) Oral two times a day PRN Anxiety  melatonin 3 milliGRAM(s) Oral at bedtime PRN Insomnia  ondansetron Injectable 4 milliGRAM(s) IV Push every 8 hours PRN Nausea and/or Vomiting  Allergies    No Known Allergies    Intolerances        SUBJECTIVE: in bed in NAD,     Vital Signs Last 24 Hrs  T(C): 37.3 (21 Feb 2023 05:00), Max: 37.5 (20 Feb 2023 17:18)  T(F): 99.2 (21 Feb 2023 05:00), Max: 99.5 (20 Feb 2023 17:18)  HR: 98 (21 Feb 2023 06:29) (67 - 102)  BP: 141/91 (21 Feb 2023 05:00) (108/68 - 153/72)  BP(mean): --  RR: 18 (21 Feb 2023 05:00) (16 - 18)  SpO2: 95% (21 Feb 2023 05:00) (93% - 95%)    Parameters below as of 21 Feb 2023 05:00  Patient On (Oxygen Delivery Method): nasal cannula        PHYSICAL EXAM:  GENERAL: NAD, well-groomed, well-developed  HEAD:  Atraumatic, Normocephalic  EYES: EOMI, PERRLA, conjunctiva and sclera clear  ENMT: No tonsillar erythema, exudates, or enlargement; Moist mucous membranes, Good dentition, No lesions  NECK: Supple,   CHEST/LUNG: Clear to  auscultation bilaterally; No rales, rhonchi, wheezing, or rubs  bilaterally  HEART: Regular rate and rhythm; No murmurs, rubs, or gallops  ABDOMEN: Soft, Nontender, Nondistended; Bowel sounds present  EXTREMITIES:  2+ Peripheral Pulses, No clubbing, cyanosis, or edema BL LE  SKIN: No rashes or lesions  NERVOUS SYSTEM:  Alert & Oriented X3, Good concentration; Motor Strength 5/5 B/L upper and lower extremities;   DTRs 2+ intact and symmetric, sensation intact BL    LABS:                                                     11.6   2.92  )-----------( 401      ( 20 Feb 2023 06:09 )             35.1   02-20    140  |  105  |  3<L>  ----------------------------<  95  3.5   |  30  |  0.43<L>    Ca    8.8      20 Feb 2023 06:09  Phos  2.8     02-20  Mg     1.9     02-20        Cultures;   CAPILLARY BLOOD GLUCOSE        Lipid panel:           RADIOLOGY & ADDITIONAL TESTS:      Imaging Personally Reviewed:  [ x] YES      Consultant(s) Notes Reviewed:  [x ] YES     Care Discussed with [x ] Consultants [X ] Patient [x ] Family  [x ]    [x ]  Other; RN

## 2023-02-21 NOTE — DISCHARGE NOTE PROVIDER - PROVIDER TOKENS
INTERNAL MED PROGRESS NOTE    Urvashi Staley is a 85 year old female patient.    Subjective:    Seen this am  Denies pain  Chart reviewed  Vitals stable  Calorie count completed : 42% of estimated energy needs and 51% of estimated protein needs. Pt with severe malnutrition and not consistently taking adequate nutrition    Objective:    Current Vitals:  Visit Vitals  /73   Pulse 84   Temp 98.4 °F (36.9 °C) (Oral)   Resp 16   Ht 5' 6\" (1.676 m)   Wt 44.1 kg (97 lb 3.6 oz)   SpO2 96%   BMI 15.69 kg/m²       Constitutional: a&O to self only  HEENT: no icterus, no thrush. Om moist  Cardiovascular: regular  Pulmonary/Chest: clear b/l   Abdominal: Soft. Tender in the lower quadrants ; no guarding;  ND  Extr :  1+ pitting pedal  edema.no cyanosis    A/P:    1) Klebsiella uti : completed 5 days of rocephin    2) Dehydration on admission : corrected with ivf    3) acute on chronic hypotension sec to volume depletion on admission : resolved with ivf; continue midodrine.    4) Left adnexal cystic/solid mass : gyne rec gyne oncologist at [facility] if further mgmt pursued    5) Multiple b/l renal calculi with mild right hydronephrosis . Nonobstructive    6) mild Chronic anemia     7) h/o DM-2 but she has been off meds and sugars at goal    8) severe protein malnutrition     9) Adult failure to thrive with BMI 15. On marinol. Variable po intake.    10) leucocytosis , reactive    My associate spoke to pt's SDM Ms Zuniga feels that quality of life is important and she does not want pt's life to be prolonged on machines.  She would like pt to be DNR.  She also does not want any workup of the pelvic mass.      Will get hospice eval .  Will call SDM to discuss this as well    I/O Summary:    Intake/Output Summary (Last 24 hours) at 7/26/2021 8675  Last data filed at 7/26/2021 0834  Gross per 24 hour   Intake 240 ml   Output --   Net 240 ml       Admit Weight: 44.1 kg (97  FREE:[LAST:[PCP],FIRST:[Oncologist],PHONE:[(   )    -],FAX:[(   )    -],ADDRESS:[Follow up with your PCP and Oncologist within 1 week.]] lb 3.6 oz)   Current Weight: 44.1 kg (97 lb 3.6 oz)    Medications:  Continuous Medications:      Scheduled Medications:  • dronabinol  2.5 mg Oral BID   • QUEtiapine  25 mg Oral Nightly   • aspirin  81 mg Oral Daily   • docusate sodium  100 mg Oral Daily   • folic acid  1 mg Oral Daily   • midodrine  5 mg Oral BID   • oxybutynin  5 mg Oral Daily       PRN Medications  acetaminophen, melatonin    LABS:   Recent Labs   Lab 07/24/21  0538 07/23/21  0459 07/22/21  0834   SODIUM 142 140 141   CHLORIDE 107 107 109*   CO2 28 27 29   BUN 5* 6 11   CREATININE 0.30* 0.28* 0.33*   CALCIUM 8.2* 8.3* 8.2*   GLUCOSE 120* 92 115*     Recent Labs   Lab 07/24/21  0538   WBC 11.3*   RBC 4.25   HGB 11.0*   HCT 36.2        No results found  Results for orders placed or performed during the hospital encounter of 07/19/21   Electrocardiogram 12-Lead   Result Value Ref Range    Ventricular Rate EKG/Min (BPM) 87     Atrial Rate (BPM) 87     DC-Interval (MSEC) 190     QRS-Interval (MSEC) 62     QT-Interval (MSEC) 344     QTc 414     P Axis (Degrees) 103     R Axis (Degrees) -31     T Axis (Degrees) -3     REPORT TEXT       Normal sinus rhythm  Left axis deviation  Pulmonary disease pattern  Inferior infarct  , age undetermined  Abnormal ECG  No previous ECGs available  Confirmed by JORDY MACIAS MD ([id number]) on 7/19/2021 5:04:58 PM             Ann Hernandez MD   7/26/2021 11:32 PM

## 2023-02-22 ENCOUNTER — NON-APPOINTMENT (OUTPATIENT)
Age: 78
End: 2023-02-22

## 2023-02-22 ENCOUNTER — TRANSCRIPTION ENCOUNTER (OUTPATIENT)
Age: 78
End: 2023-02-22

## 2023-02-23 ENCOUNTER — APPOINTMENT (OUTPATIENT)
Dept: PULMONOLOGY | Facility: CLINIC | Age: 78
End: 2023-02-23
Payer: MEDICARE

## 2023-02-23 LAB
CULTURE RESULTS: SIGNIFICANT CHANGE UP
SPECIMEN SOURCE: SIGNIFICANT CHANGE UP

## 2023-02-23 PROCEDURE — 99496 TRANSJ CARE MGMT HIGH F2F 7D: CPT | Mod: 95

## 2023-02-23 NOTE — ASSESSMENT
[FreeTextEntry1] : Complex case, 77-year-old woman with history as above.  Had actually been doing very well overall despite her moderate to severe COPD and right upper lobe lung cancer has been tolerating chemo and radiation up to early February.  Had been scheduled for follow-up CT in the hopes of immunotherapy.  Admitted to the hospital 2/17 after an episode of confusion and fever of 102.7.  Acute respiratory failure with hypoxemia, groundglass opacities throughout the right lung emphysema, and small bilateral pleural effusions.  Cultures all negative.  White count was 2.9.  Patient was empirically treated with antibiotics.  Afebrile through her hospital stay.  Required oxygen at t discharge.  Still requiring significant amounts of oxygen, oxygen saturation on 3 L 90 to 94% and desaturates quickly.  Seems to improve with albuterol.  Daughter reported 1 fever the night she came home but has been afebrile since.  She is completing a course of cefpodoxime at home.\par She feels well overall and does not feel sick\par \par Also considering the possibility this is a radiation pneumonitis.\par Has an end of course of steroids with possible infection.  She is now completing antibiotics.  Unclear what that 1 episode of fever was when she first got home.  She been afebrile since.  I advised her to please monitor her temperatures, we will follow-up tomorrow.  If afebrile for 72 hours, also hypoxic, will contact it or adding steroids\par \par Message left with the office of CONRADO Hodge, to discuss as well\par \par Pt now also agrees to Homecare - referaral made

## 2023-02-23 NOTE — PHYSICAL EXAM
[No Acute Distress] : no acute distress [Normal Sclera/Conjunctiva] : normal sclera/conjunctiva [Normal Outer Ear/Nose] : the outer ears and nose were normal in appearance [No Respiratory Distress] : no respiratory distress  [No Accessory Muscle Use] : no accessory muscle use [Normal] : affect was normal and insight and judgment were intact [76201 - High Complexity requires an extensive number of possible diagnoses and/or the management options, extensive complexity of the medical data (tests, etc.) to be reviewed, and a high risk of significant complications, morbidity, and/or mortality as w] : High Complexity  [de-identified] : on 3lpm. speaking clearly and easily, no resp effort

## 2023-02-23 NOTE — HISTORY OF PRESENT ILLNESS
[Home] : at home, [unfilled] , at the time of the visit. [Medical Office: ([facility])___] : at the medical office located in  [Family Member] : family member [Verbal consent obtained from patient] : the patient, [unfilled] [Discharged with Oxygen] : Discharged with oxygen [[facility]] : [facility]hospitalization from [facility] [Pneumonia] : Pneumonia [Respiratory failure with hypoxia] : Respiratory failure with hypoxia [Yes] : Yes [Admitted on: ___] : The patient was admitted on [unfilled] [Discharged on ___] : discharged on [unfilled] [Discharge Summary] : discharge summary [Pertinent Labs] : pertinent labs [Radiology Findings] : radiology findings [Discharge Med List] : discharge medication list [Med Reconciliation] : medication reconciliation has been completed [Patient Contacted By: ____] : and contacted by [unfilled] [FreeTextEntry2] : Telehealth post hospital follow-up visit.  Patient's daughter is present as well.  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  Not previously on oxygen\par \par Patient had received her last dose of chemotherapy, Taxol/carboplatinum and radiation to the right lung mass February 8.\par Overall she has been tolerating it well.  Was scheduled to be evaluated for immunotherapy as the next treatment.  And was maintained on daily Trelegy COPD.  Had also been diagnosed with atrial fibrillation and started on NOAC.\par \par On February 17 she had an episode of acute confusion.  Her daughter found her temperature to be 102.7, patient was taken to [place].  Initially noted to be hypotensive to 90/60.  Hypoxia requiring oxygen patient was started on empiric antibiotics.  She had a CT that showed the known right upper lobe mass, patchy right upper lobe and right lower lobe opacities On top of underlying emphysema, bilateral small pleural effusions.  She was afebrile in the hospital.  White blood cell count was 2.9 cultures were negative.  Patient was empirically treated for pneumonia.  She was discharged home 2 days ago, to complete an outpatient course of cefpodoxime.  She also required oxygen at discharge.  Per her daughter home care was offered but the patient refused.\par Patient had received the shingles shot 3 days before admission, which was also \par \par Daughter reports that on arrival back home on the night of the 21st she again had 1 temperature of 102.  But has not had fever since then.  She does sometimes feel cold.  Oral temperature at time 96.  He is back on Trelegy (had been on Symbicort in the hospital).  She is still requiring significant amounts of oxygen.  This morning, doing her activity in the bathroom off of oxygen, her daughter found her at 75%.  She reports that prior to albuterol even on the 3 L she was saturation 88 to 90%.  After albuterol increased 94%, he definitely feels better after albuterol.  Patient notes a little bit of shortness of breath with exertion, but overall she does not feel bad.  She does not feel sick.

## 2023-02-24 ENCOUNTER — FORM ENCOUNTER (OUTPATIENT)
Age: 78
End: 2023-02-24

## 2023-02-24 ENCOUNTER — APPOINTMENT (OUTPATIENT)
Dept: PULMONOLOGY | Facility: CLINIC | Age: 78
End: 2023-02-24
Payer: MEDICARE

## 2023-02-24 PROCEDURE — 99213 OFFICE O/P EST LOW 20 MIN: CPT | Mod: 95

## 2023-02-24 NOTE — REASON FOR VISIT
[Home] : at home, [unfilled] , at the time of the visit. [Medical Office: ([facility])___] : at the medical office located in  [Other:____] : [unfilled] [Patient] : the patient

## 2023-02-24 NOTE — HISTORY OF PRESENT ILLNESS
[TextBox_4] : Follow-up visit.  Telehealth tried, but was unable.  Switch to telephonic\par \par Patient reports her daughter said 100.2 yesterday at night.  Afebrile today.  Blood pressure stable today 123/66.  Oxygen saturation remains only 92% on 3 L.  She is still feeling short of breath.\par On the phone, she sounds okay.  Speaking clearly without respiratory effort.  Currently she said her sat is 92% on the 3 L.\par \par I also spoke with the nurse practitioner from Dr. Woo Leon's office to return my call yesterday.  Dr. Leon is currently away.  She agrees that certainly the possibility of that this is an acute radiation issue is likely.\par \par Given the fact the patient remains so short of breath and with such high oxygen needs despite the antibiotics, I think steroids are warranted.\par I am starting with a Medrol pack.  She has a follow-up appointment with me in person next week.  Patient advised to call me however if fevers go up or blood pressure goes down\par \par Time spent 23 minutes\par

## 2023-02-25 DIAGNOSIS — E87.6 HYPOKALEMIA: ICD-10-CM

## 2023-02-25 DIAGNOSIS — E83.39 OTHER DISORDERS OF PHOSPHORUS METABOLISM: ICD-10-CM

## 2023-02-25 DIAGNOSIS — Z87.891 PERSONAL HISTORY OF NICOTINE DEPENDENCE: ICD-10-CM

## 2023-02-25 DIAGNOSIS — I95.9 HYPOTENSION, UNSPECIFIED: ICD-10-CM

## 2023-02-25 DIAGNOSIS — J44.9 CHRONIC OBSTRUCTIVE PULMONARY DISEASE, UNSPECIFIED: ICD-10-CM

## 2023-02-25 DIAGNOSIS — I48.91 UNSPECIFIED ATRIAL FIBRILLATION: ICD-10-CM

## 2023-02-25 DIAGNOSIS — Z79.01 LONG TERM (CURRENT) USE OF ANTICOAGULANTS: ICD-10-CM

## 2023-02-25 DIAGNOSIS — I10 ESSENTIAL (PRIMARY) HYPERTENSION: ICD-10-CM

## 2023-02-25 DIAGNOSIS — A41.9 SEPSIS, UNSPECIFIED ORGANISM: ICD-10-CM

## 2023-02-25 DIAGNOSIS — J96.01 ACUTE RESPIRATORY FAILURE WITH HYPOXIA: ICD-10-CM

## 2023-03-01 ENCOUNTER — APPOINTMENT (OUTPATIENT)
Dept: PULMONOLOGY | Facility: CLINIC | Age: 78
End: 2023-03-01
Payer: MEDICARE

## 2023-03-01 VITALS
HEIGHT: 63 IN | OXYGEN SATURATION: 93 % | SYSTOLIC BLOOD PRESSURE: 159 MMHG | BODY MASS INDEX: 21.44 KG/M2 | WEIGHT: 121 LBS | TEMPERATURE: 97 F | HEART RATE: 69 BPM | RESPIRATION RATE: 15 BRPM | DIASTOLIC BLOOD PRESSURE: 71 MMHG

## 2023-03-01 PROCEDURE — 99213 OFFICE O/P EST LOW 20 MIN: CPT

## 2023-03-01 NOTE — END OF VISIT
[] : Fellow [FreeTextEntry3] : Patient is feeling so much better after the Medrol pack.  Almost back to baseline.  Monitors her oxygen at home, really does not need supplementation anymore at all with rest.  She still using it with exertion, unclear if she even needs that.  Her energy is back and she is feeling much better.  Continue to titrate off O2 as long as she keeps oxygen 90 to higher.  Continue with her COPD regimen.  She has an upcoming follow-up appointment with oncology at [facility], they are going to be repeating a CAT scan prior to immunotherapy.  Hopefully it shows good improvement as I expect it well.  I asked them to have them send me the report.

## 2023-03-01 NOTE — HISTORY OF PRESENT ILLNESS
[TextBox_4] : 76YO Female PMH SCC (s/p chemo, radiation, not surgical candidate) and moderate to severe COPD who presents for follow up.\par daughter present\par \par She was recently admitted to [facility] 2/17-2/23 for confusion and fever. She was treated for pneumonia with antibiotics\par \par She was seen last for a televisit the day after discharge 2/24\par \par She is using her Trelegy daily and her Albuterol 4x a day. Her SOB at rest and hypoxia has improved after being on prednisone. \par She finished the Medrol dose pack today. She has not had a fever. \par She still has the SOB and hypoxia with exertion. \par \par She is planning to go to [facility] to start immunotherapy in the coming weeks though they are planning a CT chest first./

## 2023-03-02 RX ORDER — VALSARTAN AND HYDROCHLOROTHIAZIDE 160; 12.5 MG/1; MG/1
160-12.5 TABLET, FILM COATED ORAL
Qty: 90 | Refills: 3 | Status: DISCONTINUED | COMMUNITY
Start: 2021-07-08 | End: 2023-03-02

## 2023-03-19 ENCOUNTER — RX RENEWAL (OUTPATIENT)
Age: 78
End: 2023-03-19

## 2023-04-11 ENCOUNTER — RX CHANGE (OUTPATIENT)
Age: 78
End: 2023-04-11

## 2023-04-25 ENCOUNTER — APPOINTMENT (OUTPATIENT)
Dept: PULMONOLOGY | Facility: CLINIC | Age: 78
End: 2023-04-25
Payer: MEDICARE

## 2023-04-25 VITALS
OXYGEN SATURATION: 94 % | DIASTOLIC BLOOD PRESSURE: 66 MMHG | WEIGHT: 123.25 LBS | TEMPERATURE: 97.5 F | HEART RATE: 89 BPM | SYSTOLIC BLOOD PRESSURE: 133 MMHG | RESPIRATION RATE: 16 BRPM | BODY MASS INDEX: 21.84 KG/M2 | HEIGHT: 63 IN

## 2023-04-25 PROCEDURE — 99214 OFFICE O/P EST MOD 30 MIN: CPT

## 2023-04-25 RX ORDER — DEXAMETHASONE 4 MG/1
4 TABLET ORAL
Qty: 4 | Refills: 0 | Status: DISCONTINUED | COMMUNITY
Start: 2023-01-19 | End: 2023-04-25

## 2023-04-25 RX ORDER — FLUTICASONE FUROATE AND VILANTEROL TRIFENATATE 200; 25 UG/1; UG/1
200-25 POWDER RESPIRATORY (INHALATION) DAILY
Qty: 2 | Refills: 0 | Status: DISCONTINUED | COMMUNITY
Start: 2022-10-21 | End: 2023-04-25

## 2023-04-25 NOTE — DISCUSSION/SUMMARY
[FreeTextEntry1] : 77-year-old woman, non-small cell lung cancer, CMML, severe COPD, admission earlier this year with Pittore failure with hypoxemia, pneumonia, possible radiation pneumonitis.  Much better now.\par \par Patient is feeling well and doing very well.  Currently on immunotherapy after completing radiation and chemotherapy for non-small cell lung cancer, decrease in size of the right hilar mass on CAT scan in March and resolved previous bilateral pulmonary nodules.\par The CT report from [facility] in March reports on new areas of consolidation on the right since November, but these would not have been able to be compared to February.  Therefore, I have asked her to please ask [facility] to mail me a CD so that it can be compared\par \par No longer requiring oxygen\par \par Continue daily Trelegy\par \par Continued follow-up with [facility] oncology, Dr. ROCHE, for treatment of the non-small cell lung cancer\par She follows regularly with hematology for treatment of the CMML.  While on chemo, her counts had nadired back essentially to the normal range, have been rising again since then.  She showed me her most recent labs from today.  White blood cell count came down to 41 from 43, platelets came down to 828 from 895.  Her dose of Hydrea is being increased.\par \par She will follow-up with me in 3 months with a pulmonary function test at that time.

## 2023-04-25 NOTE — HISTORY OF PRESENT ILLNESS
[Former] : former [TextBox_4] : 77-year-old woman here for follow-up visit.\par Non-small cell lung cancer status post chemotherapy and radiation therapy, now on immunotherapy with durvalumab\par CMML, currently on Hydrea with weekly CBCs being monitored\par Severe COPD on Trelegy\par Admission in February with pneumonia, possible postradiation pneumonitis treated as well after discharge.  This helped with symptoms and to get her off the oxygen.\par \par Overall she is doing really well.  Patient feels well.  She does not require oxygen.  Her sats remain in the mid 90s to upper 90s.  She uses her Trelegy daily.  No shortness of breath.\par \par She is taking her durvalumab, Hydrea, dose was doubled today, atenolol, Xarelto, statin, valsartan, omeprazole and the Trelegy.\par \par She had a CT of the chest at [facility] on March 14.  I am able to see the report on her phone.  The report reads that since November 2022 there has been decrease size in the right perihilar mass, resolved bilateral nodules, and new areas of consolidation on the right.\par Liver, [facility] would have been unable to see the consolidations that were present in February.\par

## 2023-04-25 NOTE — CONSULT LETTER
[Consult Letter:] : I had the pleasure of evaluating your patient, [unfilled]. [FreeTextEntry2] : Dr. Woo ROCHE\par [facility]

## 2023-05-15 ENCOUNTER — APPOINTMENT (OUTPATIENT)
Dept: CARDIOLOGY | Facility: CLINIC | Age: 78
End: 2023-05-15
Payer: MEDICARE

## 2023-05-15 VITALS
HEIGHT: 63 IN | BODY MASS INDEX: 22.27 KG/M2 | HEART RATE: 76 BPM | DIASTOLIC BLOOD PRESSURE: 65 MMHG | WEIGHT: 125.66 LBS | SYSTOLIC BLOOD PRESSURE: 119 MMHG | OXYGEN SATURATION: 96 % | RESPIRATION RATE: 16 BRPM | TEMPERATURE: 97.5 F

## 2023-05-15 PROCEDURE — 93010 ELECTROCARDIOGRAM REPORT: CPT

## 2023-05-15 PROCEDURE — 99215 OFFICE O/P EST HI 40 MIN: CPT

## 2023-05-15 PROCEDURE — 93000 ELECTROCARDIOGRAM COMPLETE: CPT

## 2023-05-15 NOTE — PHYSICAL EXAM
[Well Developed] : well developed [No Acute Distress] : no acute distress [Normal Conjunctiva] : normal conjunctiva [No Xanthelasma] : no xanthelasma [Normal Venous Pressure] : normal venous pressure [Normal S1, S2] : normal S1, S2 [No Murmur] : no murmur [No Rub] : no rub [No Gallop] : no gallop [Clear Lung Fields] : clear lung fields [Good Air Entry] : good air entry [No Respiratory Distress] : no respiratory distress  [Soft] : abdomen soft [Normal Gait] : normal gait [Gait - Sufficient for Exercise Testing] : gait - sufficient for exercise testing [No Edema] : no edema [No Cyanosis] : no cyanosis [No Clubbing] : no clubbing [No Varicosities] : no varicosities [No Rash] : no rash [Moves all extremities] : moves all extremities [No Focal Deficits] : no focal deficits [Normal Speech] : normal speech [Alert and Oriented] : alert and oriented [Well Nourished] : well nourished

## 2023-05-15 NOTE — REVIEW OF SYSTEMS
[Dyspnea on exertion] : dyspnea during exertion [Negative] : Heme/Lymph [Palpitations] : no palpitations [Dysphagia] : dysphagia

## 2023-05-15 NOTE — REASON FOR VISIT
[Other: _____] : [unfilled] [FreeTextEntry3] : [facility] (Dr. Leon) [FreeTextEntry1] : KALA VALLEJO is a 77 year old woman with a history of COPD who was diagnosed with concurrent  CML and non-small cell lung carcinoma in 2022 seen for follow up of paroxysmal atrial fibrillation, HTN, and HLD.\par \par Prior Cancer Treatments:\par ------------------------------------------------------------------------\par Chemo/targeted therapy:\par 12/2022-2/2023: carbo/paclitaxel\par 3/2023: durvalumab (s/p 3 cycles thus far)\par 5/2023: hydroxyurea\par ------------------------------------------------------------------------\par Surgery:\par ------------------------------------------------------------------------\par Radiation:\par 12/2022-2/2022: 35 fractions over 7 weeks

## 2023-05-15 NOTE — HISTORY OF PRESENT ILLNESS
[FreeTextEntry1] : Interval History:\antonina Admitted to the hospital in February for PNA following radiation and some cardiac medication adjustments were made. Since discharge, she has gone back on previous regimen. \par Started on durvalumab 3/16 and s/p 3 cycles thus far, without side effects or apparent toxicity.\par Because of rising platelets, hydroxyurea was started for CML a few weeks ago. She is unsure about whether she should have TKI therapy, and current plan is to delay this, if possible, until further along in the course of immunotherapy.\par She remains on Xarelto.\par Feels good overall.\par \par History:\par This female smoker with a history of hypertension for many years found to have CML after routine blood tests done by her PCP. 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 [place] [facility] [place] and will begin chemo/radiation in a few weeks. Plan for 7 weeks of radiation 35 fractions total (5 days per week for 7 weeks) along with 7 weekly cycles of chemotherapy to be followed by immune-checkpoint inhibitor. CML to be managed conservatively with observation pending treatment of lung cancer.\par \par Started apixaban and notes bleeding from lip, but able to achieve hemostasis with pressure and denies significant blood loss. Occasional palpitations when arguing with Ariana. No chest pain. No edema, orthopnea.\par \par Feb 13, 2023:\par Has difficulty affording apixaban, Xarelto preferred (tier 3).\par Feels well. No palpitations. No chest pain, edema.\par Completed carbo/taxol.\par BPs have been generally controlled on current regimen.\par To begin durvalumab for 6-12 months after follow up CT.\par \par \par Cardiovascular Summary:\par ----------------------------------------------\par ECG:\par 5/15/2023:NSR 68 bpm, possible lateral infarct, no change from prior\par 2/13/2023: NSR, possible lateral infarct, possible inferior infarct. No change from prior.\par 11/28/2022: NSR 60 bpm, possible lateral infarct, possible inferior infarct. No significant change.\par 10/14/2022: NSR, possible inferior infarct, possible anterior infarct.\par 9/15/2022: NSR, non-specific IVCD - similar to 10/14/2022\par ----------------------------------------------\par Echo:\par 10/20/2022: EF 65 %, GLS -22.6 %\par ----------------------------------------------\par CT/MRI\par 9/28/2022: coronary/aortic calcifications, 6.4 cm right hilar mass as above\par ----------------------------------------------\par Remote/ambulatory rhythm monitoring:\par 11/2022: Zio patch - atrial fibrillation

## 2023-05-15 NOTE — ASSESSMENT
[FreeTextEntry1] : 77 year old woman with ASCVD risk factors, atherosclerotic calcifications on CT scan, CML and stage II lung cancer s/p chemotherapy/radiation and 3 cycles into planned one year consolidation immunotherapy with durvalumab. Prior to starting cancer treatment was found to have new onset AF and we started anticoagulation. She is currently in sinus rhythm and rate is controlled.\par \par ECG suggestive of long-standing hypertension. And echo showed normal LVEF and strain with concentric LVH.\par \par Patch monitoring done for irregular rhythm disclosed paroxysmal atrial fibrillation, mostly at night, rates controlled. Started on apixaban for stroke prevention (changed to Xarelto due to insurance). \par \par # Paroxysmal AF:\par - continue rivaroxaban 20 mg daily\par - ok to hold as needed for procedures, hold 3 days prior to tooth extraction/dental work\par - hold for platelet < 75 or bleeding\par - continue atenolol 50 for now\par \par # Hypertension: BP is controlled on the present regimen, so will make no change today\par - will monitor BP and call if readings become out of range\par - continue valsartan 160, atenolol 50, amlodipine 5 mg daily\par \par # Atherosclerotic calcifications, CML, lung cancer. JAK2 negative.\par - continue rosuvastatin 5 mg daily\par - Lipid panel 2/15/2023: LDL 65, non-HDL 83, HDL 88, total 171, after statin initiated\par - baseline troponin T and troponin I  prior to immunotherapy both normal\par \par Outside records and reports reviewed. No evidence of IRAE after 3 cycles. TSH and CMP were checked recently at [facility]. \par \par Follow up in 4 months with me and for baseline optimization prior to initiation of BCR-Abl TKI PRN

## 2023-07-05 ENCOUNTER — APPOINTMENT (OUTPATIENT)
Dept: PULMONOLOGY | Facility: CLINIC | Age: 78
End: 2023-07-05
Payer: MEDICARE

## 2023-07-05 VITALS
HEIGHT: 61.4 IN | WEIGHT: 129 LBS | OXYGEN SATURATION: 93 % | HEART RATE: 83 BPM | BODY MASS INDEX: 24.04 KG/M2 | DIASTOLIC BLOOD PRESSURE: 64 MMHG | SYSTOLIC BLOOD PRESSURE: 115 MMHG

## 2023-07-05 DIAGNOSIS — J44.9 CHRONIC OBSTRUCTIVE PULMONARY DISEASE, UNSPECIFIED: ICD-10-CM

## 2023-07-05 PROCEDURE — 94726 PLETHYSMOGRAPHY LUNG VOLUMES: CPT

## 2023-07-05 PROCEDURE — ZZZZZ: CPT

## 2023-07-05 PROCEDURE — 94060 EVALUATION OF WHEEZING: CPT

## 2023-07-05 PROCEDURE — 94729 DIFFUSING CAPACITY: CPT

## 2023-07-05 PROCEDURE — 99213 OFFICE O/P EST LOW 20 MIN: CPT | Mod: 25

## 2023-07-05 NOTE — ASSESSMENT
[FreeTextEntry1] : Plan:\par \par -Continue to use Trelegy and Albuterol daily\par -Use oxygen at home, especially at night\par -Continue to refrain from smoking\par -Increase exercise\par -Request copy of report and CD scheduled CT scan on 7/31 at [facility] [place], sent to office\par \par \par attedning:\par agree with above\par doing well, off 02. daily trelegy. not really using albuterol -\par dry cough. f/u CT is scheduled for end of the month

## 2023-07-05 NOTE — HISTORY OF PRESENT ILLNESS
[Former] : former [>= 20 pack years] : >= 20 pack years [Never] : never [Intermittent] : Intermittent [NC] : Nasal Cannula [PRN] : As needed [TextBox_4] : Patient is a 76y/o F, with history of severe COPD, Non-small cell lung cancer, S/P  chemotherapy and radiation therapy, presently in fifth month of immunotherapy, presents for F/U. \par \par Endorses episodes of SOB S/P knee pain when walking up stairs, and when during argument with her roommate of 17 years, and dry cough occasionally. Denies wheezing, fever, hoarseness, change in voice, or rhinorrhea. Continue to use Trelegy, Albuterol, on Xarelto. Has home oxygen, however does not use frequently. \par \par Followed by [facility], presented last CT scan from 3.2023, next CT scan scheduled for 7/31/23, copy of CD will be mailed to practice with report. \par \par Quit smoking 1 year ago after a 60 years, pack a day history. \par  [TextBox_13] : 60 [YearQuit] : 2022 [FreeTextEntry1] : 2

## 2023-07-25 NOTE — H&P PST ADULT - NSICDXPASTMEDICALHX_GEN_ALL_CORE_FT
"Brenna Leblanc, you are seeing this mutual patient in a few days.  He mentioned to me today that his mother "had to have her blood drained", I could not find any objective data regarding hemochromatosis in his family history.  Although his cardiologist had mentioned "hemochromatosis" in one of his notes previously.  The patient himself has no clue as to this diagnosis.  I do not see a ferritin level in his chart.  I do not believe this relates to the abnormal white blood cells but thought you may want to know in case you wanted to evaluate for other potential coexisting conditions.  He has mild elevations in liver enzymes.  And he also does have diabetes.  Thanks."
PAST MEDICAL HISTORY:  CML (chronic myelocytic leukemia)     COPD (chronic obstructive pulmonary disease)     Hypertension     Solitary pulmonary nodule

## 2023-08-01 LAB
ALBUMIN SERPL ELPH-MCNC: 4.4 G/DL
ALP BLD-CCNC: 109 U/L
ALT SERPL-CCNC: 21 U/L
ANION GAP SERPL CALC-SCNC: 15 MMOL/L
APTT BLD: 30.1 SEC
AST SERPL-CCNC: 32 U/L
BASOPHILS # BLD AUTO: 3.22 K/UL
BASOPHILS NFR BLD AUTO: 7.8 %
BILIRUB SERPL-MCNC: 0.6 MG/DL
BUN SERPL-MCNC: 17 MG/DL
CALCIUM SERPL-MCNC: 11.6 MG/DL
CHLORIDE SERPL-SCNC: 97 MMOL/L
CO2 SERPL-SCNC: 28 MMOL/L
CREAT SERPL-MCNC: 0.61 MG/DL
EGFR: 93 ML/MIN/1.73M2
EOSINOPHIL # BLD AUTO: 0.37 K/UL
EOSINOPHIL NFR BLD AUTO: 0.9 %
GLUCOSE SERPL-MCNC: 63 MG/DL
HCT VFR BLD CALC: 46.3 %
HGB BLD-MCNC: 13.9 G/DL
INR PPP: 1.03 RATIO
LYMPHOCYTES # BLD AUTO: 3.55 K/UL
LYMPHOCYTES NFR BLD AUTO: 8.6 %
MAN DIFF?: NORMAL
MCHC RBC-ENTMCNC: 27.9 PG
MCHC RBC-ENTMCNC: 30 GM/DL
MCV RBC AUTO: 92.8 FL
MONOCYTES # BLD AUTO: 1.4 K/UL
MONOCYTES NFR BLD AUTO: 3.4 %
NEUTROPHILS # BLD AUTO: 25.65 K/UL
NEUTROPHILS NFR BLD AUTO: 62.1 %
PLATELET # BLD AUTO: 833 K/UL
POTASSIUM SERPL-SCNC: 4.3 MMOL/L
PROT SERPL-MCNC: 6.9 G/DL
PT BLD: 12 SEC
RBC # BLD: 4.99 M/UL
RBC # FLD: 16.7 %
SODIUM SERPL-SCNC: 140 MMOL/L
WBC # FLD AUTO: 41.31 K/UL

## 2023-08-22 ENCOUNTER — RX RENEWAL (OUTPATIENT)
Age: 78
End: 2023-08-22

## 2023-08-24 PROBLEM — Z00.00 ENCOUNTER FOR PREVENTIVE HEALTH EXAMINATION: Status: ACTIVE | Noted: 2023-08-24

## 2023-08-29 ENCOUNTER — APPOINTMENT (OUTPATIENT)
Dept: ORTHOPEDIC SURGERY | Facility: CLINIC | Age: 78
End: 2023-08-29

## 2023-09-05 ENCOUNTER — RX RENEWAL (OUTPATIENT)
Age: 78
End: 2023-09-05

## 2023-09-10 NOTE — REASON FOR VISIT
[Other: _____] : [unfilled] [FreeTextEntry3] [place] [facility] 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  Prior Cancer Treatments: ------------------------------------------------------------------------ Chemo/targeted therapy: 12/2022-2/2023: carbo/paclitaxel 3/2023: durvalumab (s/p 3 cycles thus far) 5/2023: hydroxyurea ------------------------------------------------------------------------ Surgery: ------------------------------------------------------------------------ Radiation: 12/2022-2/2022: 35 fractions over 7 weeks

## 2023-09-10 NOTE — HISTORY OF PRESENT ILLNESS
[FreeTextEntry1] : Interval History:   History: This female smoker with a history of hypertension for many years found to have CML after routine blood tests done by her PCP. During workup for CML, imaging of the chest showed a 6.4 cm partially calcified right hilar mass in the right upper lobe.  Takes atenolol 50, amlodipine, and valsartan/HCTZ for hypertension, but at times BP uncontrolled.  2022: Biopsy of lung mass consistent with NSCL adenocarcinoma. She chose to go to [facility] [place] for a second opinion and will begin chemo/radiation in a few weeks. Plan for 7 weeks of radiation 35 fractions total (5 days per week for 7 weeks) along with 7 weekly cycles of chemotherapy to be followed by immune-checkpoint inhibitor. CML to be managed conservatively with observation pending treatment of lung cancer.  Started apixaban and notes bleeding from lip, but able to achieve hemostasis with pressure and denies significant blood loss. Occasional palpitations when arguing with Ariana. No chest pain. No edema, orthopnea.  2023: Has difficulty affording apixaban, Xarelto preferred (tier 3). Feels well. No palpitations. No chest pain, edema. Completed carbo/taxol. BPs have been generally controlled on current regimen. To begin durvalumab for 6-12 months after follow up CT.  May 15, 2023: Admitted to the hospital in February for PNA following radiation and some cardiac medication adjustments were made. Since discharge, she has gone back on previous regimen.  Started on durvalumab 3/ and s/p 3 cycles thus far, without side effects or apparent toxicity. Because of rising platelets, hydroxyurea was started for CML a few weeks ago. She is unsure about whether she should have TKI therapy, and current plan is to delay this, if possible, until further along in the course of immunotherapy. She remains on Xarelto. Feels good overall.   Cardiovascular Summary: ---------------------------------------------- EC2023:  5/15/2023:NSR 68 bpm, possible lateral infarct, no change from prior 2023: NSR, possible lateral infarct, possible inferior infarct. No change from prior. 2022: NSR 60 bpm, possible lateral infarct, possible inferior infarct. No significant change. 10/14/2022: NSR, possible inferior infarct, possible anterior infarct. 9/15/2022: NSR, non-specific IVCD - similar to 10/14/2022 ---------------------------------------------- Echo: 10/20/2022: EF 65 %, GLS -22.6 % ---------------------------------------------- CT/MRI 2022: coronary/aortic calcifications, 6.4 cm right hilar mass as above ---------------------------------------------- Remote/ambulatory rhythm monitorin2022: Zio patch - atrial fibrillation

## 2023-09-10 NOTE — PHYSICAL EXAM
[Well Developed] : well developed [Well Nourished] : well nourished [No Acute Distress] : no acute distress [Normal Conjunctiva] : normal conjunctiva [No Xanthelasma] : no xanthelasma [Normal Venous Pressure] : normal venous pressure [Normal S1, S2] : normal S1, S2 [No Murmur] : no murmur [No Rub] : no rub [No Gallop] : no gallop [Clear Lung Fields] : clear lung fields [Good Air Entry] : good air entry [No Respiratory Distress] : no respiratory distress  [Soft] : abdomen soft [Normal Gait] : normal gait [Gait - Sufficient for Exercise Testing] : gait - sufficient for exercise testing [No Edema] : no edema [No Cyanosis] : no cyanosis [No Clubbing] : no clubbing [No Varicosities] : no varicosities [No Rash] : no rash [Moves all extremities] : moves all extremities [No Focal Deficits] : no focal deficits [Normal Speech] : normal speech [Alert and Oriented] : alert and oriented

## 2023-09-10 NOTE — ASSESSMENT
[FreeTextEntry1] : -------------- 77 year old woman with ASCVD risk factors, atherosclerotic calcifications on CT scan, CML and stage II lung cancer s/p chemotherapy/radiation and 3 cycles into planned one year consolidation immunotherapy with durvalumab. Prior to starting cancer treatment was found to have new onset AF and we started anticoagulation. She is currently in sinus rhythm and rate is controlled.    # Paroxysmal AF: Patch monitoring done for irregular rhythm disclosed paroxysmal atrial fibrillation, mostly at night, rates controlled. Started on apixaban for stroke prevention (changed to Xarelto due to insurance).  - continue rivaroxaban 20 mg daily - hold as needed for procedures, hold 3 days prior to tooth extraction/dental work - hold for platelet < 75 or bleeding - continue atenolol 50 for now  # Hypertension: ECG suggestive of long-standing hypertension. And echo showed normal LVEF and strain with concentric LVH. - will monitor BP and call if readings become out of range - continue valsartan 160, atenolol 50, amlodipine 5 mg daily  # Atherosclerotic calcifications, CML, lung cancer. JAK2 negative. - continue rosuvastatin 5 mg daily - Lipid panel 2/15/2023: LDL 65, non-HDL 83, HDL 88, total 171, after statin initiated - baseline troponin T and troponin I  prior to immunotherapy both normal   Follow up in 4 months with me and for baseline optimization prior to initiation of BCR-Abl TKI PRN

## 2023-09-10 NOTE — REVIEW OF SYSTEMS
[Dyspnea on exertion] : dyspnea during exertion [Palpitations] : no palpitations [Dysphagia] : dysphagia [Negative] : Heme/Lymph

## 2023-09-11 ENCOUNTER — APPOINTMENT (OUTPATIENT)
Dept: CARDIOLOGY | Facility: CLINIC | Age: 78
End: 2023-09-11
Payer: MEDICARE

## 2023-09-11 VITALS
WEIGHT: 131.83 LBS | OXYGEN SATURATION: 96 % | BODY MASS INDEX: 24.58 KG/M2 | TEMPERATURE: 97.5 F | DIASTOLIC BLOOD PRESSURE: 76 MMHG | HEART RATE: 85 BPM | SYSTOLIC BLOOD PRESSURE: 133 MMHG

## 2023-09-11 DIAGNOSIS — R94.31 ABNORMAL ELECTROCARDIOGRAM [ECG] [EKG]: ICD-10-CM

## 2023-09-11 PROCEDURE — 93000 ELECTROCARDIOGRAM COMPLETE: CPT

## 2023-09-11 PROCEDURE — 99214 OFFICE O/P EST MOD 30 MIN: CPT

## 2023-09-19 ENCOUNTER — APPOINTMENT (OUTPATIENT)
Dept: CARDIOLOGY | Facility: CLINIC | Age: 78
End: 2023-09-19

## 2023-09-19 ENCOUNTER — APPOINTMENT (OUTPATIENT)
Dept: CARDIOLOGY | Facility: CLINIC | Age: 78
End: 2023-09-19
Payer: MEDICARE

## 2023-09-19 PROCEDURE — 93306 TTE W/DOPPLER COMPLETE: CPT

## 2023-09-19 PROCEDURE — 93356 MYOCRD STRAIN IMG SPCKL TRCK: CPT

## 2023-09-22 ENCOUNTER — NON-APPOINTMENT (OUTPATIENT)
Age: 78
End: 2023-09-22

## 2023-10-18 ENCOUNTER — NON-APPOINTMENT (OUTPATIENT)
Age: 78
End: 2023-10-18

## 2023-11-05 ENCOUNTER — RX RENEWAL (OUTPATIENT)
Age: 78
End: 2023-11-05

## 2023-11-05 RX ORDER — ROSUVASTATIN CALCIUM 5 MG/1
5 TABLET, FILM COATED ORAL
Qty: 90 | Refills: 1 | Status: ACTIVE | COMMUNITY
Start: 2022-11-28 | End: 1900-01-01

## 2023-11-22 ENCOUNTER — TRANSCRIPTION ENCOUNTER (OUTPATIENT)
Age: 78
End: 2023-11-22

## 2023-12-05 ENCOUNTER — TRANSCRIPTION ENCOUNTER (OUTPATIENT)
Age: 78
End: 2023-12-05

## 2023-12-07 RX ORDER — RIVAROXABAN 15 MG-20MG
0 KIT ORAL
Qty: 0 | Refills: 3 | DISCHARGE

## 2023-12-07 RX ORDER — APIXABAN 2.5 MG/1
1 TABLET, FILM COATED ORAL
Qty: 0 | Refills: 0 | DISCHARGE

## 2023-12-07 RX ORDER — ONDANSETRON 8 MG/1
1 TABLET, FILM COATED ORAL
Qty: 0 | Refills: 0 | DISCHARGE

## 2023-12-07 RX ORDER — FLUTICASONE FUROATE, UMECLIDINIUM BROMIDE AND VILANTEROL TRIFENATATE 200; 62.5; 25 UG/1; UG/1; UG/1
0 POWDER RESPIRATORY (INHALATION)
Qty: 0 | Refills: 1 | DISCHARGE

## 2023-12-07 RX ORDER — ALBUTEROL 90 UG/1
0 AEROSOL, METERED ORAL
Qty: 0 | Refills: 0 | DISCHARGE

## 2023-12-07 RX ORDER — AMLODIPINE BESYLATE 2.5 MG/1
1 TABLET ORAL
Qty: 0 | Refills: 0 | DISCHARGE

## 2023-12-11 RX ORDER — RIVAROXABAN 20 MG/1
20 TABLET, FILM COATED ORAL
Qty: 90 | Refills: 3 | Status: ACTIVE | COMMUNITY
Start: 2023-02-13 | End: 1900-01-01

## 2024-02-04 NOTE — REASON FOR VISIT
[Other: _____] : [unfilled] [FreeTextEntry1] : ------------------------------------------------------------------------ KALA VALLEJO is a 78 year old woman with a history of COPD who was diagnosed with concurrent  CML and non-small cell lung carcinoma in 2022 seen for follow up of paroxysmal atrial fibrillation, HTN, and HLD.  Prior Cancer Treatments: ------------------------------------------------------------------------ Chemo/targeted therapy: 12/2022-2/2023: carbo/paclitaxel 3/2023: durvalumab (s/p 3 cycles thus far) 5/2023: hydroxyurea ------------------------------------------------------------------------ Surgery: ------------------------------------------------------------------------ Radiation: 12/2022-2/2022: 35 fractions over 7 weeks [FreeTextEntry3] : [facility] (Dr. Leon)

## 2024-02-04 NOTE — HISTORY OF PRESENT ILLNESS
[FreeTextEntry1] : Interval History:   History: This female smoker with a history of hypertension for many years found to have CML after routine blood tests done by her PCP. During workup for CML, imaging of the chest showed a 6.4 cm partially calcified right hilar mass in the right upper lobe.  Takes atenolol 50, amlodipine, and valsartan/HCTZ for hypertension, but at times BP uncontrolled.  2022: Biopsy of lung mass consistent with NSCL adenocarcinoma. She chose to go to [facility] [place] for a second opinion and will begin chemo/radiation in a few weeks. Plan for 7 weeks of radiation 35 fractions total (5 days per week for 7 weeks) along with 7 weekly cycles of chemotherapy to be followed by immune-checkpoint inhibitor. CML to be managed conservatively with observation pending treatment of lung cancer.  Started apixaban and notes bleeding from lip, but able to achieve hemostasis with pressure and denies significant blood loss. Occasional palpitations when arguing with Ariana. No chest pain. No edema, orthopnea.  2023: Has difficulty affording apixaban, Xarelto preferred (tier 3). Feels well. No palpitations. No chest pain, edema. Completed carbo/taxol. BPs have been generally controlled on current regimen. To begin durvalumab for 6-12 months after follow up CT.  May 15, 2023: Admitted to the hospital in February for PNA following radiation and some cardiac medication adjustments were made. Since discharge, she has gone back on previous regimen.  Started on durvalumab 3/ and s/p 3 cycles thus far, without side effects or apparent toxicity. Because of rising platelets, hydroxyurea was started for CML a few weeks ago. She is unsure about whether she should have TKI therapy, and current plan is to delay this, if possible, until further along in the course of immunotherapy. She remains on Xarelto. Feels good overall.  2023: Feels OK. Remains on immunotherapy, tolerating well with plans to complete one year. CML is being managed with hydroxyurea and serial monitoring of the blood count. She denies palpitations. She is on Trelegy for COPD, which has helped breathing. ECG today new inferior Q wave   Cardiovascular Summary: ---------------------------------------------- EC2024: 2023: NSR 74 bpm, LAD, possible lateral infarct, possible inferior infarct vs LAFB 5/15/2023: NSR 68 bpm, possible lateral infarct, no change from prior 2023: NSR, possible lateral infarct, possible inferior infarct. No change from prior. 2022: NSR 60 bpm, possible lateral infarct, possible inferior infarct. No significant change. 10/14/2022: NSR, possible inferior infarct, possible anterior infarct. 9/15/2022: NSR, non-specific IVCD - similar to 10/14/2022 ---------------------------------------------- Echo: 2023: EF 65 %, GLS -19 10/20/2022: EF 65 %, GLS -22.6 % ---------------------------------------------- CT/MRI 2022: coronary/aortic calcifications, 6.4 cm right hilar mass as above ---------------------------------------------- Remote/ambulatory rhythm monitorin2022: Zio patch - atrial fibrillation

## 2024-02-04 NOTE — PHYSICAL EXAM
[Well Developed] : well developed [Well Nourished] : well nourished [No Acute Distress] : no acute distress [Normal Conjunctiva] : normal conjunctiva [Normal Venous Pressure] : normal venous pressure [No Xanthelasma] : no xanthelasma [Normal S1, S2] : normal S1, S2 [No Murmur] : no murmur [No Rub] : no rub [No Gallop] : no gallop [Clear Lung Fields] : clear lung fields [Good Air Entry] : good air entry [No Respiratory Distress] : no respiratory distress  [Soft] : abdomen soft [Normal Gait] : normal gait [Gait - Sufficient for Exercise Testing] : gait - sufficient for exercise testing [No Edema] : no edema [No Cyanosis] : no cyanosis [No Clubbing] : no clubbing [No Varicosities] : no varicosities [Moves all extremities] : moves all extremities [No Rash] : no rash [No Focal Deficits] : no focal deficits [Normal Speech] : normal speech [Alert and Oriented] : alert and oriented

## 2024-02-04 NOTE — ASSESSMENT
[FreeTextEntry1] : -------------- 78 year old woman with ASCVD risk factors, atherosclerotic calcifications on CT scan, CML and stage II lung cancer s/p chemotherapy/radiation and 3 cycles into planned one year consolidation immunotherapy with durvalumab. Prior to starting cancer treatment was found to have new onset AF and we started anticoagulation. She is currently in sinus rhythm and rate is controlled.  # Paroxysmal AF: Patch monitoring done for irregular rhythm disclosed paroxysmal atrial fibrillation, mostly at night, rates controlled. Started on apixaban for stroke prevention (changed to Xarelto due to insurance).  - continue rivaroxaban 20 mg daily - hold as needed for procedures, hold 3 days prior to tooth extraction/dental work - hold for platelet < 75 or bleeding - continue atenolol 50 for now  # Hypertension: ECG suggestive of long-standing hypertension. And echo showed normal LVEF and strain with concentric LVH. - will monitor BP and call if readings become out of range - continue valsartan 160, atenolol 50, amlodipine 5 mg daily  # Atherosclerotic calcifications, CML, lung cancer. JAK2 negative. - continue rosuvastatin 5 mg daily - Lipid panel 2/15/2023: LDL 65, non-HDL 83, HDL 88, total 171, after statin initiated - baseline troponin T and troponin I  prior to immunotherapy both normal   Follow up in

## 2024-02-05 ENCOUNTER — APPOINTMENT (OUTPATIENT)
Dept: CARDIOLOGY | Facility: CLINIC | Age: 79
End: 2024-02-05
Payer: MEDICARE

## 2024-02-05 VITALS
TEMPERATURE: 97.2 F | WEIGHT: 128.09 LBS | SYSTOLIC BLOOD PRESSURE: 128 MMHG | HEIGHT: 61.4 IN | BODY MASS INDEX: 23.87 KG/M2 | OXYGEN SATURATION: 98 % | HEART RATE: 60 BPM | DIASTOLIC BLOOD PRESSURE: 81 MMHG

## 2024-02-05 DIAGNOSIS — J18.9 PNEUMONIA, UNSPECIFIED ORGANISM: ICD-10-CM

## 2024-02-05 DIAGNOSIS — Z86.2 PERSONAL HISTORY OF DISEASES OF THE BLOOD AND BLOOD-FORMING ORGANS AND CERTAIN DISORDERS INVOLVING THE IMMUNE MECHANISM: ICD-10-CM

## 2024-02-05 DIAGNOSIS — Z87.898 PERSONAL HISTORY OF OTHER SPECIFIED CONDITIONS: ICD-10-CM

## 2024-02-05 DIAGNOSIS — Z23 ENCOUNTER FOR IMMUNIZATION: ICD-10-CM

## 2024-02-05 DIAGNOSIS — R05.9 COUGH, UNSPECIFIED: ICD-10-CM

## 2024-02-05 DIAGNOSIS — R91.8 OTHER NONSPECIFIC ABNORMAL FINDING OF LUNG FIELD: ICD-10-CM

## 2024-02-05 PROCEDURE — 93000 ELECTROCARDIOGRAM COMPLETE: CPT

## 2024-02-05 PROCEDURE — 93010 ELECTROCARDIOGRAM REPORT: CPT

## 2024-02-05 PROCEDURE — 99215 OFFICE O/P EST HI 40 MIN: CPT

## 2024-02-05 PROCEDURE — G2211 COMPLEX E/M VISIT ADD ON: CPT

## 2024-02-05 RX ORDER — METHYLPREDNISOLONE 4 MG/1
4 TABLET ORAL
Qty: 1 | Refills: 0 | Status: COMPLETED | COMMUNITY
Start: 2023-02-24 | End: 2024-02-05

## 2024-02-05 NOTE — REVIEW OF SYSTEMS
[Feeling Fatigued] : feeling fatigued [Dyspnea on exertion] : not dyspnea during exertion [Palpitations] : no palpitations [Easy Bruising] : a tendency for easy bruising [Negative] : Heme/Lymph [FreeTextEntry1] : gait imbalance [Diarrhea] : diarrhea

## 2024-02-07 PROBLEM — J44.9 CHRONIC OBSTRUCTIVE PULMONARY DISEASE, UNSPECIFIED: Chronic | Status: ACTIVE | Noted: 2023-02-17

## 2024-02-07 PROBLEM — I48.91 UNSPECIFIED ATRIAL FIBRILLATION: Chronic | Status: ACTIVE | Noted: 2023-02-17

## 2024-02-07 PROBLEM — I10 ESSENTIAL (PRIMARY) HYPERTENSION: Chronic | Status: ACTIVE | Noted: 2023-02-17

## 2024-02-07 PROBLEM — C34.90 MALIGNANT NEOPLASM OF UNSPECIFIED PART OF UNSPECIFIED BRONCHUS OR LUNG: Chronic | Status: ACTIVE | Noted: 2023-02-17

## 2024-02-07 PROBLEM — C95.90 LEUKEMIA, UNSPECIFIED NOT HAVING ACHIEVED REMISSION: Chronic | Status: ACTIVE | Noted: 2023-02-17

## 2024-02-19 ENCOUNTER — INPATIENT (INPATIENT)
Facility: HOSPITAL | Age: 79
LOS: 4 days | Discharge: OTHER INSTITUTION | End: 2024-02-24
Attending: INTERNAL MEDICINE | Admitting: INTERNAL MEDICINE
Payer: MEDICARE

## 2024-02-19 VITALS
DIASTOLIC BLOOD PRESSURE: 76 MMHG | OXYGEN SATURATION: 95 % | TEMPERATURE: 98 F | SYSTOLIC BLOOD PRESSURE: 147 MMHG | HEART RATE: 63 BPM | RESPIRATION RATE: 20 BRPM

## 2024-02-19 DIAGNOSIS — G93.89 OTHER SPECIFIED DISORDERS OF BRAIN: ICD-10-CM

## 2024-02-19 DIAGNOSIS — L05.91 PILONIDAL CYST WITHOUT ABSCESS: Chronic | ICD-10-CM

## 2024-02-19 DIAGNOSIS — Z90.710 ACQUIRED ABSENCE OF BOTH CERVIX AND UTERUS: Chronic | ICD-10-CM

## 2024-02-19 LAB
ALBUMIN SERPL ELPH-MCNC: 4.3 G/DL — SIGNIFICANT CHANGE UP (ref 3.3–5)
ALP SERPL-CCNC: 188 U/L — HIGH (ref 40–120)
ALT FLD-CCNC: 76 U/L — HIGH (ref 4–33)
ANION GAP SERPL CALC-SCNC: 10 MMOL/L — SIGNIFICANT CHANGE UP (ref 7–14)
APTT BLD: 36.4 SEC — HIGH (ref 24.5–35.6)
AST SERPL-CCNC: 42 U/L — HIGH (ref 4–32)
BASOPHILS # BLD AUTO: 0.12 K/UL — SIGNIFICANT CHANGE UP (ref 0–0.2)
BASOPHILS NFR BLD AUTO: 2.6 % — HIGH (ref 0–2)
BILIRUB SERPL-MCNC: 0.9 MG/DL — SIGNIFICANT CHANGE UP (ref 0.2–1.2)
BLD GP AB SCN SERPL QL: NEGATIVE — SIGNIFICANT CHANGE UP
BUN SERPL-MCNC: 15 MG/DL — SIGNIFICANT CHANGE UP (ref 7–23)
CALCIUM SERPL-MCNC: 9.5 MG/DL — SIGNIFICANT CHANGE UP (ref 8.4–10.5)
CHLORIDE SERPL-SCNC: 107 MMOL/L — SIGNIFICANT CHANGE UP (ref 98–107)
CO2 SERPL-SCNC: 25 MMOL/L — SIGNIFICANT CHANGE UP (ref 22–31)
CREAT SERPL-MCNC: 0.52 MG/DL — SIGNIFICANT CHANGE UP (ref 0.5–1.3)
EGFR: 96 ML/MIN/1.73M2 — SIGNIFICANT CHANGE UP
EOSINOPHIL # BLD AUTO: 0 K/UL — SIGNIFICANT CHANGE UP (ref 0–0.5)
EOSINOPHIL NFR BLD AUTO: 0 % — SIGNIFICANT CHANGE UP (ref 0–6)
GLUCOSE SERPL-MCNC: 117 MG/DL — HIGH (ref 70–99)
HCT VFR BLD CALC: 43.5 % — SIGNIFICANT CHANGE UP (ref 34.5–45)
HGB BLD-MCNC: 14.3 G/DL — SIGNIFICANT CHANGE UP (ref 11.5–15.5)
IANC: 4.08 K/UL — SIGNIFICANT CHANGE UP (ref 1.8–7.4)
INR BLD: 1.1 RATIO — SIGNIFICANT CHANGE UP (ref 0.85–1.18)
LYMPHOCYTES # BLD AUTO: 0.16 K/UL — LOW (ref 1–3.3)
LYMPHOCYTES # BLD AUTO: 3.5 % — LOW (ref 13–44)
MCHC RBC-ENTMCNC: 28.6 PG — SIGNIFICANT CHANGE UP (ref 27–34)
MCHC RBC-ENTMCNC: 32.9 GM/DL — SIGNIFICANT CHANGE UP (ref 32–36)
MCV RBC AUTO: 87 FL — SIGNIFICANT CHANGE UP (ref 80–100)
MONOCYTES # BLD AUTO: 0 K/UL — SIGNIFICANT CHANGE UP (ref 0–0.9)
MONOCYTES NFR BLD AUTO: 0 % — LOW (ref 2–14)
NEUTROPHILS # BLD AUTO: 4.17 K/UL — SIGNIFICANT CHANGE UP (ref 1.8–7.4)
NEUTROPHILS NFR BLD AUTO: 88.7 % — HIGH (ref 43–77)
NEUTS BAND # BLD: 2.6 % — SIGNIFICANT CHANGE UP (ref 0–6)
PLAT MORPH BLD: NORMAL — SIGNIFICANT CHANGE UP
PLATELET # BLD AUTO: 320 K/UL — SIGNIFICANT CHANGE UP (ref 150–400)
PLATELET COUNT - ESTIMATE: NORMAL — SIGNIFICANT CHANGE UP
POTASSIUM SERPL-MCNC: 3.8 MMOL/L — SIGNIFICANT CHANGE UP (ref 3.5–5.3)
POTASSIUM SERPL-SCNC: 3.8 MMOL/L — SIGNIFICANT CHANGE UP (ref 3.5–5.3)
PROT SERPL-MCNC: 7.2 G/DL — SIGNIFICANT CHANGE UP (ref 6–8.3)
PROTHROM AB SERPL-ACNC: 12.4 SEC — SIGNIFICANT CHANGE UP (ref 9.5–13)
RBC # BLD: 5 M/UL — SIGNIFICANT CHANGE UP (ref 3.8–5.2)
RBC # FLD: 14.1 % — SIGNIFICANT CHANGE UP (ref 10.3–14.5)
RBC BLD AUTO: NORMAL — SIGNIFICANT CHANGE UP
RH IG SCN BLD-IMP: POSITIVE — SIGNIFICANT CHANGE UP
SODIUM SERPL-SCNC: 142 MMOL/L — SIGNIFICANT CHANGE UP (ref 135–145)
VARIANT LYMPHS # BLD: 2.6 % — SIGNIFICANT CHANGE UP (ref 0–6)
WBC # BLD: 4.57 K/UL — SIGNIFICANT CHANGE UP (ref 3.8–10.5)
WBC # FLD AUTO: 4.57 K/UL — SIGNIFICANT CHANGE UP (ref 3.8–10.5)

## 2024-02-19 PROCEDURE — 99285 EMERGENCY DEPT VISIT HI MDM: CPT

## 2024-02-19 RX ORDER — DEXAMETHASONE 0.5 MG/5ML
4 ELIXIR ORAL EVERY 6 HOURS
Refills: 0 | Status: DISCONTINUED | OUTPATIENT
Start: 2024-02-19 | End: 2024-02-24

## 2024-02-19 NOTE — CONSULT NOTE ADULT - PROBLEM SELECTOR RECOMMENDATION 9
-MRI brain w/wo contrast   -CT CAP   -Decadron 4q6h  -Medical oncology consult  -Radiation oncology consult once MRI obtained    [id number]  Case d/w attending

## 2024-02-19 NOTE — ED PROVIDER NOTE - NSICDXPASTMEDICALHX_GEN_ALL_CORE_FT
PAST MEDICAL HISTORY:  Atrial fibrillation     CML (chronic myelocytic leukemia)     COPD (chronic obstructive pulmonary disease)     COPD (chronic obstructive pulmonary disease)     Hypertension     Hypertension     Leukemia     Lung cancer     Solitary pulmonary nodule

## 2024-02-19 NOTE — CHART NOTE - NSCHARTNOTEFT_GEN_A_CORE
Patient with h/o lung cancer and CML (On bosulif , but on hold due to inc LFTs)  Referred [place] [facility] [place] for frequent falls and imbalance  Has CT of brain today that showed right cerebellar mass with vasogenic edema, mass effect on adjacent fourth ventricle and probably small foci of microbleed  Given dexamethasone 10 mg IV , transferred to ED    Patient needs urgent MRI brain and neurosurgery evaluation    please admit to Dr Edmond Zheng hospitalist     Full oncology consult to follow in the morning

## 2024-02-19 NOTE — ED ADULT NURSE NOTE - CHIEF COMPLAINT QUOTE
Pt AOX4 arrives from [facility] in [place] for Neuro eval/admit; c/o unsteady gait and trip and falls s/p discovery of swelling and lesion on brain; Hx of CLL and Lung Ca  Hx of AFib on Xarelto; pt arrives with IV in Right AC from [facility]; see ppwk in chart from [facility]

## 2024-02-19 NOTE — ED PROVIDER NOTE - OBJECTIVE STATEMENT
Patient is a 78-year-old female past medical history CML (he was on Bosulif which was discontinued due to LFT elevations) presented to outside hospital complaining of falls, dizziness for 3 weeks.  She states while walking she feels that she falls to the right and had a fall today into the snow denies any injuries.  At outside hospital CT head showing new right cerebellar mass with surrounding vasogenic edema and mass effect on the fourth ventricle and probable small foci of microbleed consistent with metastases.  No acute territorial infarct.  Patient was given 10 mg IV Dex and transferred here to receive neurosurgical evaluation and MRI.  Daughter at bedside confirms the above and assisting history.  Daughter states that mother may be unaware of the fact that this mass is a metastases and worried about how she will react when she understands.

## 2024-02-19 NOTE — ED PROVIDER NOTE - PHYSICAL EXAMINATION
General: well appearing, interactive, well nourished, no apparent distress, ncat  HEENT: EOMI, PERRLA, normal mucosa, normal oropharynx, no lesions on the lips or on oral mucosa, normal external ear  Neck: supple, no lymphadenopathy, full range of motion, no nuchal rigidity  CV: RRR, normal S1 and S2 with no murmur, capillary refill less than two seconds  Resp: lungs CTA b/l, good aeration bilaterally, symmetric chest wall   Abd: non-distended, soft, non-tender  : no CVA tenderness  [facility]: full range of motion, no cyanosis, no edema, no clubbing, no immobility  Neuro: +Ataxic gate. CN II-XII grossly intact, muscle strength 5/5 in all extremities  Skin: no rashes, skin intact

## 2024-02-19 NOTE — ED PROVIDER NOTE - ATTENDING CONTRIBUTION TO CARE
79yo F with h/o lung cancer and CML (On bosulif , but on hold due to inc LFTs)  Referred from [facility] [place] at [place] for frequent falls and imbalance  Has CT of brain today that showed right cerebellar mass with vasogenic edema, mass effect on adjacent fourth ventricle and probably small foci of microbleed  Given dexamethasone 10 mg IV , transferred to ED for further eval, MRI and neuro sx eval  pt states she has been also dropping items from her rigth hand  no headache, nausea vomiting fevers, chills  well appearing on exam  neuro intact  neuro sx to see and pt tba

## 2024-02-19 NOTE — ED PROVIDER NOTE - CLINICAL SUMMARY MEDICAL DECISION MAKING FREE TEXT BOX
Patient is a 78-year-old female past medical history CML (he was on Bosulif which was discontinued due to LFT elevations) presented to outside hospital complaining of falls, dizziness for 3 weeks.  She states while walking she feels that she falls to the right and had a fall today into the snow denies any injuries.  At outside hospital CT head showing new right cerebellar mass with surrounding vasogenic edema and mass effect on the fourth ventricle and probable small foci of microbleed consistent with metastases.  No acute territorial infarct.  Patient was given 10 mg IV Dex and transferred here to receive neurosurgical evaluation and MRI.  Daughter at bedside confirms the above and assisting history.  Daughter states that mother may be unaware of the fact that this mass is a metastases and worried about how she will react when she understands.  VSS  On exam patient is well appearing, A=Ox4 and no focal neuro deficits, however with ataxic gate  Disc with outside imaging sent for upload. Neurosurgical consult placed, will come to see patient.

## 2024-02-19 NOTE — CONSULT NOTE ADULT - SUBJECTIVE AND OBJECTIVE BOX
NEUROSURGERY CONSULT    HPI: 77yo F PMH HTN, Afib on Xarelto, CML (on Bosulif, held d/t elevated LFTs), and lung cancer presenting w/ frequent falls and dizziness x 1 week. Pt [place] at [facility] [place] and CML- pt patient and daughter she was diagnosed with lung cancer that was non-resectable d/t location and patient was started on chemo/radiation. Treatment for CML held while being treated for lung cancer, but pt recently started on treatment from Dec-Jan, but currently held d/t elevated LFTs. Per patient, she had recent full body scans approximately 3 months ago that were stable. Patient states that over the past week she has noted issues with her balance and difficulties ambulating due to feeling unsteady. Per daughter, she has also been dropping items out of right hand and daughter feels she walks with lean towards left side. Pt has had a few recent falls due to feeling unsteady. Pt was seen at outside hospital d/t symptoms and has CT head showing new right cerebellar mass w/ surrounding vasogenic edema.     RADIOLOGY:   CTH 2/19/24 from outside hospital: Right cerebellar mass measuring 3.4 x 3.2 cm w/ surrounding hyperdensities c/w metastasis w/ associated vasogenic edema. Minimal mass effect to right lateral aspect of fourth ventricle. No acute hydrocephalus.     MEDS:      Vital Signs Last 24 Hrs  T(C): 36.7 (19 Feb 2024 20:45), Max: 36.7 (19 Feb 2024 19:08)  T(F): 98 (19 Feb 2024 20:45), Max: 98 (19 Feb 2024 19:08)  HR: 74 (19 Feb 2024 20:45) (63 - 74)  BP: 155/80 (19 Feb 2024 20:45) (147/76 - 155/80)  BP(mean): --  RR: 18 (19 Feb 2024 20:45) (18 - 20)  SpO2: 95% (19 Feb 2024 20:45) (95% - 95%)    Parameters below as of 19 Feb 2024 20:45  Patient On (Oxygen Delivery Method): room air        LABS:                        14.3   4.57  )-----------( 320      ( 19 Feb 2024 22:13 )             43.5     02-19    142  |  107  |  15  ----------------------------<  117<H>  3.8   |  25  |  0.52    Ca    9.5      19 Feb 2024 22:13    TPro  7.2  /  Alb  4.3  /  TBili  0.9  /  DBili  x   /  AST  42<H>  /  ALT  76<H>  /  AlkPhos  188<H>  02-19    PT/INR - ( 19 Feb 2024 22:13 )   PT: 12.4 sec;   INR: 1.10 ratio         PTT - ( 19 Feb 2024 22:13 )  PTT:36.4 sec      PHYSICAL EXAM:  AOx3, follows commands, face symmetrical  PERRL, EOMI  MULTANI 5/5 throughout  SILT  No pronator drift  No dysmetria

## 2024-02-19 NOTE — ED ADULT NURSE NOTE - NSFALLHARMRISKINTERV_ED_ALL_ED

## 2024-02-19 NOTE — ED ADULT NURSE NOTE - OBJECTIVE STATEMENT
Pt received to room 13 Pt A&O x 4 ambulatory with an unsteady gait. Pt c/o neuro eval from [facility] for [place]. Pt endorses multiple trips and falls over the past month. Pt has a known 4x3 lesion in the brain. Pt endorses dizziness when standing and walking. Hx of AFib on Xarelto. Pt denies fevers, chills, headache, vision changes, chest pain, SOB, numbness or tingling, abdominal pain, N/V/D/C, or urinary symptoms. PERRLA observed. No neuro deficits. Respirations even and unlabored. Abdomen soft, nontender, nondistended. +2 pulses in all extremities. 20G IV placed in the left AC. Pt arrives with a Iv in the right AC for infusions. Labs drawn and sent. Comfort measure provided. Safety maintained. Pending lab results and imaging.

## 2024-02-19 NOTE — ED ADULT TRIAGE NOTE - CHIEF COMPLAINT QUOTE
[place] AOX4 [place] [facility] [place]/admit; c/o unsteady gait and trip and falls s/p discovery of swelling and lesion on brain; Hx of CLL and Lung Ca  Hx of AFib on Xarelto; pt arrives with IV in Right AC from [facility]; see ppwk in chart from [facility]

## 2024-02-19 NOTE — ED PROVIDER NOTE - PROGRESS NOTE DETAILS
Karan Mcgraw DO (PGY-1) Patient reevaluated at bedside. Patient is doing well. Admitted to Dr. Zheng

## 2024-02-20 DIAGNOSIS — F41.9 ANXIETY DISORDER, UNSPECIFIED: ICD-10-CM

## 2024-02-20 DIAGNOSIS — R26.81 UNSTEADINESS ON FEET: ICD-10-CM

## 2024-02-20 DIAGNOSIS — Z29.9 ENCOUNTER FOR PROPHYLACTIC MEASURES, UNSPECIFIED: ICD-10-CM

## 2024-02-20 DIAGNOSIS — I10 ESSENTIAL (PRIMARY) HYPERTENSION: ICD-10-CM

## 2024-02-20 DIAGNOSIS — I48.0 PAROXYSMAL ATRIAL FIBRILLATION: ICD-10-CM

## 2024-02-20 DIAGNOSIS — J44.9 CHRONIC OBSTRUCTIVE PULMONARY DISEASE, UNSPECIFIED: ICD-10-CM

## 2024-02-20 DIAGNOSIS — G93.89 OTHER SPECIFIED DISORDERS OF BRAIN: ICD-10-CM

## 2024-02-20 DIAGNOSIS — R79.89 OTHER SPECIFIED ABNORMAL FINDINGS OF BLOOD CHEMISTRY: ICD-10-CM

## 2024-02-20 PROCEDURE — 70553 MRI BRAIN STEM W/O & W/DYE: CPT | Mod: 26

## 2024-02-20 PROCEDURE — 93010 ELECTROCARDIOGRAM REPORT: CPT

## 2024-02-20 PROCEDURE — 99223 1ST HOSP IP/OBS HIGH 75: CPT

## 2024-02-20 RX ORDER — PANTOPRAZOLE SODIUM 20 MG/1
40 TABLET, DELAYED RELEASE ORAL ONCE
Refills: 0 | Status: COMPLETED | OUTPATIENT
Start: 2024-02-20 | End: 2024-02-20

## 2024-02-20 RX ORDER — LANOLIN ALCOHOL/MO/W.PET/CERES
3 CREAM (GRAM) TOPICAL AT BEDTIME
Refills: 0 | Status: DISCONTINUED | OUTPATIENT
Start: 2024-02-20 | End: 2024-02-24

## 2024-02-20 RX ORDER — AMLODIPINE BESYLATE 2.5 MG/1
1 TABLET ORAL
Qty: 0 | Refills: 0 | DISCHARGE

## 2024-02-20 RX ORDER — AMLODIPINE BESYLATE 2.5 MG/1
2.5 TABLET ORAL DAILY
Refills: 0 | Status: DISCONTINUED | OUTPATIENT
Start: 2024-02-20 | End: 2024-02-24

## 2024-02-20 RX ORDER — OMEPRAZOLE 10 MG/1
0 CAPSULE, DELAYED RELEASE ORAL
Qty: 0 | Refills: 5 | DISCHARGE

## 2024-02-20 RX ORDER — CLONAZEPAM 1 MG
0.25 TABLET ORAL
Refills: 0 | Status: DISCONTINUED | OUTPATIENT
Start: 2024-02-20 | End: 2024-02-24

## 2024-02-20 RX ORDER — VALSARTAN 80 MG/1
160 TABLET ORAL DAILY
Refills: 0 | Status: DISCONTINUED | OUTPATIENT
Start: 2024-02-20 | End: 2024-02-24

## 2024-02-20 RX ORDER — PANTOPRAZOLE SODIUM 20 MG/1
40 TABLET, DELAYED RELEASE ORAL
Refills: 0 | Status: DISCONTINUED | OUTPATIENT
Start: 2024-02-21 | End: 2024-02-24

## 2024-02-20 RX ORDER — ACETAMINOPHEN 500 MG
650 TABLET ORAL EVERY 6 HOURS
Refills: 0 | Status: DISCONTINUED | OUTPATIENT
Start: 2024-02-20 | End: 2024-02-24

## 2024-02-20 RX ORDER — ATENOLOL 25 MG/1
50 TABLET ORAL DAILY
Refills: 0 | Status: DISCONTINUED | OUTPATIENT
Start: 2024-02-20 | End: 2024-02-24

## 2024-02-20 RX ORDER — TIOTROPIUM BROMIDE 18 UG/1
2 CAPSULE ORAL; RESPIRATORY (INHALATION) DAILY
Refills: 0 | Status: DISCONTINUED | OUTPATIENT
Start: 2024-02-20 | End: 2024-02-24

## 2024-02-20 RX ORDER — FLUOXETINE HCL 10 MG
10 CAPSULE ORAL DAILY
Refills: 0 | Status: DISCONTINUED | OUTPATIENT
Start: 2024-02-20 | End: 2024-02-24

## 2024-02-20 RX ORDER — FLUTICASONE FUROATE AND VILANTEROL TRIFENATATE 100; 25 UG/1; UG/1
1 POWDER RESPIRATORY (INHALATION)
Qty: 0 | Refills: 0 | DISCHARGE

## 2024-02-20 RX ORDER — ATENOLOL 25 MG/1
1 TABLET ORAL
Qty: 0 | Refills: 0 | DISCHARGE

## 2024-02-20 RX ORDER — ALBUTEROL 90 UG/1
2 AEROSOL, METERED ORAL EVERY 6 HOURS
Refills: 0 | Status: DISCONTINUED | OUTPATIENT
Start: 2024-02-20 | End: 2024-02-24

## 2024-02-20 RX ORDER — BUDESONIDE AND FORMOTEROL FUMARATE DIHYDRATE 160; 4.5 UG/1; UG/1
2 AEROSOL RESPIRATORY (INHALATION)
Refills: 0 | Status: DISCONTINUED | OUTPATIENT
Start: 2024-02-20 | End: 2024-02-24

## 2024-02-20 RX ADMIN — PANTOPRAZOLE SODIUM 40 MILLIGRAM(S): 20 TABLET, DELAYED RELEASE ORAL at 10:13

## 2024-02-20 RX ADMIN — Medication 4 MILLIGRAM(S): at 19:59

## 2024-02-20 RX ADMIN — BUDESONIDE AND FORMOTEROL FUMARATE DIHYDRATE 2 PUFF(S): 160; 4.5 AEROSOL RESPIRATORY (INHALATION) at 10:17

## 2024-02-20 RX ADMIN — BUDESONIDE AND FORMOTEROL FUMARATE DIHYDRATE 2 PUFF(S): 160; 4.5 AEROSOL RESPIRATORY (INHALATION) at 20:58

## 2024-02-20 RX ADMIN — Medication 4 MILLIGRAM(S): at 01:02

## 2024-02-20 RX ADMIN — Medication 4 MILLIGRAM(S): at 14:34

## 2024-02-20 RX ADMIN — Medication 10 MILLIGRAM(S): at 14:34

## 2024-02-20 RX ADMIN — TIOTROPIUM BROMIDE 2 PUFF(S): 18 CAPSULE ORAL; RESPIRATORY (INHALATION) at 10:28

## 2024-02-20 RX ADMIN — ATENOLOL 50 MILLIGRAM(S): 25 TABLET ORAL at 06:02

## 2024-02-20 RX ADMIN — Medication 4 MILLIGRAM(S): at 06:02

## 2024-02-20 RX ADMIN — AMLODIPINE BESYLATE 2.5 MILLIGRAM(S): 2.5 TABLET ORAL at 06:02

## 2024-02-20 NOTE — H&P ADULT - PROBLEM SELECTOR PLAN 1
Newly Dx c/b falls;  c/f metastatic disease given know lung SCC and large RT hilar mass.   CTH 2/19/24 from outside hospital: Right cerebellar mass measuring 3.4 x 3.2 cm w/ surrounding hyper-densities c/w metastasis w/ associated vasogenic edema. Minimal mass effect to right lateral aspect of fourth ventricle. No acute hydrocephalus.     -Evaluated by NSx  -MRI brain w/wo contrast ordered   -Decadron IV 4q6h  -Fall, seizure precautions   -Will defer medical oncology consult and CT CAP to Dr. Zheng as the pt follows up at [facility] and is due for interval CT next week   -Radiation oncology consult once MRI obtained if the pt plans to f/u at [facility] - deferred to Dr. Zheng  -Holding Xarelto pending MRI Brain w/wo contrast r/o bleeding

## 2024-02-20 NOTE — CONSULT NOTE ADULT - SUBJECTIVE AND OBJECTIVE BOX
Reason for consult: CML, lung cancer    HPI:  78-year-old female with  medical history of COPD, HTN, Anxiety, Afib (on Xarelto), CML (she was on Bosulif which was discontinued due to elev LFT), HLD (Rosuvastatin held due to elev LFTs), h/o Lung ca (SCC, large [id number] Rt perihilar mass); Pt  presented to outside hospital ([facility]) complaining of falls, dizziness for 3 weeks.  She states that while walking she feels that she falls to the right and had a fall today into the snow denies any injuries.  At outside hospital CT head showing new right cerebellar mass with surrounding vasogenic edema and mass effect on the fourth ventricle and probable small foci of microbleeds consistent with metastases.  No acute territorial infarct.  Patient was given 10 mg IV Dex and transferred here to receive neurosurgical evaluation and MRI.  Daughter at bedside confirms the above and assisting history.  Daughter states that mother may be unaware of the fact that this mass is a metastases and worried about how she will react when she understands. (20 Feb 2024 04:42)    Hematology/Oncology consulted on this 78 year old female with CML and metastatic lung cancer who was [place] [facility] [place] and treatment of new symptomatic brain metastases. Patient is under care of Dr. Woo Leon of [facility] for her lung cancer. 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 She completed weekly carbo/taxol with radiation followed by durvalumab, last dose on 09/27/2023. She has been under surveillance since. She is also under care of Dr. Fátima Cardenas diagnosed 09/29/2022 via bone marrow biopsy, and after completing treatment for lung cancer she began bosutinib on 12/07/2023. This has been held since 01/29/2024 due to grade IV transaminitis. She presented to [facility] with complaints of dizziness and gait instability for the past few weeks, with frequent falls. CT head was performed and showed new right cerebellar mass with surrounding vasogenic edema, mass effect on the adjacent fourth ventricle and probable small foci of microbleed, consistent with metastasis.  Patient seen this morning. She reports feeling some improvement in her symptoms, states she ambulated with PT earlier and still had some slight unsteadiness on her feet bu overall better. Pt hoping to go home soon.     PAST MEDICAL & SURGICAL HISTORY:  CML (chronic myelocytic leukemia)      COPD (chronic obstructive pulmonary disease)      Hypertension      Solitary pulmonary nodule      Lung cancer      Atrial fibrillation      Hypertension      Leukemia      COPD (chronic obstructive pulmonary disease)      History of hysterectomy      Pilonidal cyst          FAMILY HISTORY:  FH: Lucia Gehrig's disease (Father)        Alochol: Denied  Smoking: Nonsmoker  Drug Use: Denied  Marital Status:         Allergies    No Known Allergies    Intolerances        MEDICATIONS  (STANDING):  amLODIPine   Tablet 2.5 milliGRAM(s) Oral daily  atenolol  Tablet 50 milliGRAM(s) Oral daily  budesonide  80 MICROgram(s)/formoterol 4.5 MICROgram(s) Inhaler 2 Puff(s) Inhalation two times a day  dexAMETHasone  Injectable 4 milliGRAM(s) IV Push every 6 hours  FLUoxetine 10 milliGRAM(s) Oral daily  tiotropium 2.5 MICROgram(s) Inhaler 2 Puff(s) Inhalation daily  valsartan 160 milliGRAM(s) Oral daily    MEDICATIONS  (PRN):  acetaminophen     Tablet .. 650 milliGRAM(s) Oral every 6 hours PRN Temp greater or equal to 38C (100.4F), Mild Pain (1 - 3)  albuterol    90 MICROgram(s) HFA Inhaler 2 Puff(s) Inhalation every 6 hours PRN Wheezing  aluminum hydroxide/magnesium hydroxide/simethicone Suspension 30 milliLiter(s) Oral every 4 hours PRN Dyspepsia  clonazePAM Oral Disintegrating Tablet 0.25 milliGRAM(s) Oral two times a day PRN Anxiety  melatonin 3 milliGRAM(s) Oral at bedtime PRN Insomnia      ROS  +dizziness, gait instability   No fever, sweats, chills  No epistaxis, HA, sore throat  No CP, SOB, cough, sputum  No n/v/d, abd pain, melena, hematochezia  No edema  No rash  No anxiety  No back pain, joint pain  No bleeding, bruising  No dysuria, hematuria    T(C): 36.4 (02-20-24 @ 05:55), Max: 36.7 (02-19-24 @ 19:08)  HR: 77 (02-20-24 @ 08:52) (63 - 80)  BP: 120/95 (02-20-24 @ 08:52) (120/95 - 155/80)  RR: 18 (02-20-24 @ 08:52) (16 - 20)  SpO2: 100% (02-20-24 @ 08:52) (95% - 100%)  Wt(kg): --    PE  NAD  Awake, alert  Anicteric, MMM  Abd soft, NT, ND  No c/c/e  No rash grossly  FROM                          14.3   4.57  )-----------( 320      ( 19 Feb 2024 22:13 )             43.5       02-19    142  |  107  |  15  ----------------------------<  117<H>  3.8   |  25  |  0.52    Ca    9.5      19 Feb 2024 22:13    TPro  7.2  /  Alb  4.3  /  TBili  0.9  /  DBili  x   /  AST  42<H>  /  ALT  76<H>  /  AlkPhos  188<H>  02-19

## 2024-02-20 NOTE — H&P ADULT - HISTORY OF PRESENT ILLNESS
78-year-old female past medical history CML (he was on Bosulif which was discontinued due to LFT elevations) presented to outside hospital complaining of falls, dizziness for 3 weeks.  She states that while walking she feels that she falls to the right and had a fall today into the snow denies any injuries.  At outside hospital CT head showing new right cerebellar mass with surrounding vasogenic edema and mass effect on the fourth ventricle and probable small foci of microbleed consistent with metastases.  No acute territorial infarct.  Patient was given 10 mg IV Dex and transferred here to receive neurosurgical evaluation and MRI.  Daughter at bedside confirms the above and assisting history.  Daughter states that mother may be unaware of the fact that this mass is a metastases and worried about how she will react when she understands 78-year-old female with  medical history of CML (he was on Bosulif which was discontinued due to elev LFT) presented to outside hospital complaining of falls, dizziness for 3 weeks.  She states that while walking she feels that she falls to the right and had a fall today into the snow denies any injuries.  At outside hospital CT head showing new right cerebellar mass with surrounding vasogenic edema and mass effect on the fourth ventricle and probable small foci of microbleeds consistent with metastases.  No acute territorial infarct.  Patient was given 10 mg IV Dex and transferred here to receive neurosurgical evaluation and MRI.  Daughter at bedside confirms the above and assisting history.  Daughter states that mother may be unaware of the fact that this mass is a metastases and worried about how she will react when she understands. 78-year-old female with  medical history of COPD, HTN, Anxiety, Afib (on Xarelto), CML (he was on Bosulif which was discontinued due to elev LFT), HLD (Rosuvastatin held due to elev LFTs), h/o Lung ca (SCC, large [id number] Rt perihilar mass); Pt  presented to outside hospital ([facility]) complaining of falls, dizziness for 3 weeks.  She states that while walking she feels that she falls to the right and had a fall today into the snow denies any injuries.  At outside hospital CT head showing new right cerebellar mass with surrounding vasogenic edema and mass effect on the fourth ventricle and probable small foci of microbleeds consistent with metastases.  No acute territorial infarct.  Patient was given 10 mg IV Dex and transferred here to receive neurosurgical evaluation and MRI.  Daughter at bedside confirms the above and assisting history.  Daughter states that mother may be unaware of the fact that this mass is a metastases and worried about how she will react when she understands. 78-year-old female with  medical history of COPD, HTN, Anxiety, Afib (on Xarelto), CML (she was on Bosulif which was discontinued due to elev LFT), HLD (Rosuvastatin held due to elev LFTs), h/o Lung ca (SCC, large [id number] Rt perihilar mass); Pt  presented to outside hospital ([facility]) complaining of falls, dizziness for 3 weeks.  She states that while walking she feels that she falls to the right and had a fall today into the snow denies any injuries.  At outside hospital CT head showing new right cerebellar mass with surrounding vasogenic edema and mass effect on the fourth ventricle and probable small foci of microbleeds consistent with metastases.  No acute territorial infarct.  Patient was given 10 mg IV Dex and transferred here to receive neurosurgical evaluation and MRI.  Daughter at bedside confirms the above and assisting history.  Daughter states that mother may be unaware of the fact that this mass is a metastases and worried about how she will react when she understands.

## 2024-02-20 NOTE — H&P ADULT - ASSESSMENT
78-year-old female with  medical history of COPD, HTN, Anxiety, Afib (on Xarelto), CML (he was on Bosulif which was discontinued due to elev LFT), HLD (Rosuvastatin held due to elev LFTs), h/o Lung ca (SCC, large 1r2w5ln Rt perihilar mass) a/w unsteady gait and newly found RT cerebellar mass with associated vasogenic edema. Evaluated by NSx, MRI brain pending. Elevated LFTs.

## 2024-02-20 NOTE — H&P ADULT - PROBLEM SELECTOR PLAN 4
Was on Bosulif which was discontinued due to elev LFT  Rosuvastatin held due to elev LFTs    -Trend LFTs  -f/u with [facility]

## 2024-02-20 NOTE — PHYSICAL THERAPY INITIAL EVALUATION ADULT - ADDITIONAL COMMENTS
Patient report lives with room mate in house, 3 steps to enter, ambulated with out assistive device, owns a quad cane and rolling walker.

## 2024-02-20 NOTE — H&P ADULT - NSICDXFAMILYHX_GEN_ALL_CORE_FT
FAMILY HISTORY:  Father  Still living? Unknown  FH: Lucia Gehrig's disease, Age at diagnosis: Age Unknown

## 2024-02-20 NOTE — ED ADULT NURSE REASSESSMENT NOTE - NS ED NURSE REASSESS COMMENT FT1
BREAK RN: pt a&ox4 with no new complaints. neuro sensory in tact. brought to restroom via wheelchair. respirations even and unlabored with no accessory muscle use. pending bed assignment and MRI
PT is resting in stretcher, easily arousable to verbal stimuli. no apparent distress noted during  time of coverage. hand off will be given to primary nurse when return to area.
pending med from pharmacy, handoff given to primary RN Mojgan
Pt received on stretcher, A&Ox4, no labored breathing, pt denies any pain at this time.

## 2024-02-20 NOTE — H&P ADULT - TIME BILLING
Reviewed admission imaging reports, and admission labs prior to the encounter with  the patient   Reviewed Triage and ED course/ documentation   Reviewed consultants notes, including::: Neurosurgery   Performed full physical exam and ROS  Obtained list of home medications and performed home medications reconciliation on EMR  Discussed prognosis, treatment and further Neuro-Oncologic w/up  with the patient    Ordered or reviewed  new admission medications and placed or reviewed all hospitalization admission orders  Managed (continued, held or adjusted doses) home medications   Ordered  or reviewed orders of  new imaging and new lab w/up

## 2024-02-20 NOTE — H&P ADULT - PROBLEM SELECTOR PLAN 3
[id number] risk stratification score 4    -Holding Xarelto as above   -Screening EKG ordered  -c/w Atenolol

## 2024-02-20 NOTE — H&P ADULT - PSYCHIATRIC
Signed Prescriptions:                        Disp   Refills    HYDROcodone-acetaminophen (NORCO) 5-325 MG*120 ta*0        Sig: Take 1 tablet by mouth every 6 hours as needed for           other (Moderate to Severe Pain) . May fill           on/after 12/18/2017 not to start till 12/19/2017.  Authorizing Provider: BONI NAYLOR    Reviewed, printed, signed in [place].  Placed in MA basket for processing.    Boni Freeman MD  [facility]     alert and oriented x3/normal behavior

## 2024-02-20 NOTE — H&P ADULT - MUSCULOSKELETAL
no joint swelling/no joint erythema/no joint warmth/no calf tenderness/strength 5/5 bilateral upper extremities/strength 5/5 bilateral lower extremities details…

## 2024-02-20 NOTE — CONSULT NOTE ADULT - NS ATTEND AMEND GEN_ALL_CORE FT
Patient with CML , and locally advanced NSCLS S/P chemoRT and maintenance durvalumab  now admitted for imbalance, found with cerebellar mass on CT brain with vasogenic edema  pending MRI brain and NSGY evaluation after  on steroids, continue for now  Will follow

## 2024-02-20 NOTE — PHYSICAL THERAPY INITIAL EVALUATION ADULT - PERTINENT HX OF CURRENT PROBLEM, REHAB EVAL
Patient is 78-year-old female with  medical history of COPD, HTN, Anxiety, Afib (on Xarelto), CML (he was on Bosulif which was discontinued due to elevated LFT), HLD (Rosuvastatin held due to elev LFTs), Lung ca (SCC, large [id number] Rt perihilar mass), presents with unsteady gait and newly found RT cerebellar mass with associated vasogenic edema.

## 2024-02-20 NOTE — CONSULT NOTE ADULT - SUBJECTIVE AND OBJECTIVE BOX
Neurology Consult    Reason for Consult: Patient is a 78y old  Female who presents with a chief complaint of Unsteady gait and trip and falls (20 Feb 2024 12:06)      HPI:  78-year-old female with  medical history of COPD, HTN, Anxiety, Afib (on Xarelto), CML (she was on Bosulif which was discontinued due to elev LFT), HLD (Rosuvastatin held due to elev LFTs), h/o Lung ca (SCC, large [id number] Rt perihilar mass); Pt  presented to outside hospital ([facility]) complaining of falls, dizziness for 3 weeks.  She states that while walking she feels that she falls to the right and had a fall today into the snow denies any injuries.  At outside hospital CT head showing new right cerebellar mass with surrounding vasogenic edema and mass effect on the fourth ventricle and probable small foci of microbleeds consistent with metastases.  No acute territorial infarct.  Patient was given 10 mg IV Dex and transferred here to receive neurosurgical evaluation and MRI.  Daughter at bedside confirms the above and assisting history.  Daughter states that mother may be unaware of the fact that this mass is a metastases and worried about how she will react when she understands. (20 Feb 2024 04:42)       PAST MEDICAL & SURGICAL HISTORY:  CML (chronic myelocytic leukemia)      COPD (chronic obstructive pulmonary disease)      Hypertension      Solitary pulmonary nodule      Lung cancer      Atrial fibrillation      Hypertension      Leukemia      COPD (chronic obstructive pulmonary disease)      History of hysterectomy      Pilonidal cyst          Allergies: Allergies    No Known Allergies    Intolerances        Social History: Denies toxic habits including tobacco, ETOH or illicit drugs.    Family History: FAMILY HISTORY:  FH: Lucia Gehrig's disease (Father)    . No family history of strokes    Medications: MEDICATIONS  (STANDING):  amLODIPine   Tablet 2.5 milliGRAM(s) Oral daily  atenolol  Tablet 50 milliGRAM(s) Oral daily  budesonide  80 MICROgram(s)/formoterol 4.5 MICROgram(s) Inhaler 2 Puff(s) Inhalation two times a day  dexAMETHasone  Injectable 4 milliGRAM(s) IV Push every 6 hours  FLUoxetine 10 milliGRAM(s) Oral daily  tiotropium 2.5 MICROgram(s) Inhaler 2 Puff(s) Inhalation daily  valsartan 160 milliGRAM(s) Oral daily    MEDICATIONS  (PRN):  acetaminophen     Tablet .. 650 milliGRAM(s) Oral every 6 hours PRN Temp greater or equal to 38C (100.4F), Mild Pain (1 - 3)  albuterol    90 MICROgram(s) HFA Inhaler 2 Puff(s) Inhalation every 6 hours PRN Wheezing  aluminum hydroxide/magnesium hydroxide/simethicone Suspension 30 milliLiter(s) Oral every 4 hours PRN Dyspepsia  clonazePAM Oral Disintegrating Tablet 0.25 milliGRAM(s) Oral two times a day PRN Anxiety  melatonin 3 milliGRAM(s) Oral at bedtime PRN Insomnia      Review of Systems:  CONSTITUTIONAL:  No weight loss, fever, chills, weakness or fatigue.  HEENT:  Eyes:  No visual loss, blurred vision, double vision or yellow sclera. Ears, Nose, Throat:  No hearing loss, sneezing, congestion, runny nose or sore throat.  SKIN:  No rash or itching.  CARDIOVASCULAR:  No chest pain, chest pressure or chest discomfort. No palpitations or edema.  RESPIRATORY:  No shortness of breath, cough or sputum.  GASTROINTESTINAL:  No anorexia, nausea, vomiting or diarrhea. No abdominal pain or blood.  GENITOURINARY:  No burning on urination or incontinence   NEUROLOGICAL:  No headache, dizziness, syncope, paralysis, ataxia, numbness or tingling in the extremities. No change in bowel or bladder control. no limb weakness. no vision changes.   MUSCULOSKELETAL:  No muscle, back pain, joint pain or stiffness.  HEMATOLOGIC:  No anemia, bleeding or bruising.  LYMPHATICS:  No enlarged nodes. No history of splenectomy.  PSYCHIATRIC:  No history of depression or anxiety.  ENDOCRINOLOGIC:  No reports of sweating, cold or heat intolerance. No polyuria or polydipsia.      Vitals:  Vital Signs Last 24 Hrs  T(C): 36.4 (20 Feb 2024 12:52), Max: 36.7 (19 Feb 2024 19:08)  T(F): 97.6 (20 Feb 2024 12:52), Max: 98 (19 Feb 2024 19:08)  HR: 63 (20 Feb 2024 12:52) (63 - 80)  BP: 137/67 (20 Feb 2024 12:52) (120/95 - 155/80)  BP(mean): --  RR: 17 (20 Feb 2024 12:52) (16 - 20)  SpO2: 96% (20 Feb 2024 12:52) (95% - 100%)    Parameters below as of 20 Feb 2024 12:52  Patient On (Oxygen Delivery Method): room air        General Exam:   General Appearance: Appropriately dressed and in no acute distress       Head: Normocephalic, atraumatic and no dysmorphic features  Ear, Nose, and Throat: Moist mucous membranes  CVS: S1S2+  Resp: No SOB, no wheeze or rhonchi  GI: soft NT/ND  Extremities: No edema or cyanosis  Skin: No bruises or rashes     Neurological Exam:  Mental Status: Awake, alert and oriented x 3.  Able to follow simple and complex verbal commands. Able to name and repeat. fluent speech. No obvious aphasia or dysarthria noted.   Cranial Nerves: PERRL, EOMI, VFFC, sensation V1-V3 intact,  no obvious facial asymmetry, equal elevation of palate, scm/trap 5/5, tongue is midline on protrusion. hearing is grossly intact.   Motor: Normal bulk, tone and strength throughout. Fine finger movements were intact and symmetric. no tremors or drift noted.    Sensation: Intact to light touch and pinprick throughout. no right/left confusion. no extinction to tactile on DSS. Romberg was negative.   Reflexes: 1+ throughout at biceps, brachioradialis, triceps, patellars and ankles bilaterally and equal. No clonus. R toe and L toe were both downgoing.  Coordination: No dysmetria on FNF or HKS  Gait: ataxic    Data/Labs/Imaging which I personally reviewed.     Labs:     CBC Full  -  ( 19 Feb 2024 22:13 )  WBC Count : 4.57 K/uL  RBC Count : 5.00 M/uL  Hemoglobin : 14.3 g/dL  Hematocrit : 43.5 %  Platelet Count - Automated : 320 K/uL  Mean Cell Volume : 87.0 fL  Mean Cell Hemoglobin : 28.6 pg  Mean Cell Hemoglobin Concentration : 32.9 gm/dL  Auto Neutrophil # : 4.17 K/uL  Auto Lymphocyte # : 0.16 K/uL  Auto Monocyte # : 0.00 K/uL  Auto Eosinophil # : 0.00 K/uL  Auto Basophil # : 0.12 K/uL  Auto Neutrophil % : 88.7 %  Auto Lymphocyte % : 3.5 %  Auto Monocyte % : 0.0 %  Auto Eosinophil % : 0.0 %  Auto Basophil % : 2.6 %    02-19    142  |  107  |  15  ----------------------------<  117<H>  3.8   |  25  |  0.52    Ca    9.5      19 Feb 2024 22:13    TPro  7.2  /  Alb  4.3  /  TBili  0.9  /  DBili  x   /  AST  42<H>  /  ALT  76<H>  /  AlkPhos  188<H>  02-19    LIVER FUNCTIONS - ( 19 Feb 2024 22:13 )  Alb: 4.3 g/dL / Pro: 7.2 g/dL / ALK PHOS: 188 U/L / ALT: 76 U/L / AST: 42 U/L / GGT: x           PT/INR - ( 19 Feb 2024 22:13 )   PT: 12.4 sec;   INR: 1.10 ratio         PTT - ( 19 Feb 2024 22:13 )  PTT:36.4 sec  Urinalysis Basic - ( 19 Feb 2024 22:13 )    Color: x / Appearance: x / SG: x / pH: x  Gluc: 117 mg/dL / Ketone: x  / Bili: x / Urobili: x   Blood: x / Protein: x / Nitrite: x   Leuk Esterase: x / RBC: x / WBC x   Sq Epi: x / Non Sq Epi: x / Bacteria: x        < from: CT Head No Cont (02.17.23 @ 19:51) >    ACC: [id number] EXAM:  CT BRAIN   ORDERED BY: ARNEL COLEMAN     PROCEDURE DATE:  02/17/2023          INTERPRETATION:  CLINICAL STATEMENT: Altered mental status.    TECHNIQUE: Noncontrast CT of the brain was performed. Beam hardening   artifact from the petrous pyramid limits evaluation of the brainstem and   posterior fossa. KV and MA adjusted according to patient's body habitus.   Sagittal and coronal reformatted images provided.  COMPARISON: No similar prior studies for comparison.    FINDINGS:    Mild, age-appropriate atrophy and patchy periventricular hypoattenuation,   involving the left greater than right external capsules as well.    No evidence of extra-axial collection, intracranial hemorrhage, mass or   mass effect. Gray-white matter differentiation appears preserved.    Visualized paranasal sinuses and mastoid air cells appear well-aerated.   No aggressive calvarial or skull base lesion.    IMPRESSION:  1. No evidence of acute territorial infarction, hemorrhage or mass effect.  2. Age-appropriate atrophy and patchy periventricular hypoattenuation,   involving the left greater than right external capsules as well; a   nonspecific finding which statistically reflects chronic microvascular   ischemic change.    < end of copied text >    RADIOLOGY:   CTH 2/19/24 from outside hospital: Right cerebellar mass measuring 3.4 x 3.2 cm w/ surrounding hyperdensities c/w metastasis w/ associated vasogenic edema. Minimal mass effect to right lateral aspect of fourth ventricle. No acute hydrocephalus.

## 2024-02-20 NOTE — H&P ADULT - NSHPPHYSICALEXAM_GEN_ALL_CORE
Vital Signs Last 24 Hrs  T(C): 36.4 (20 Feb 2024 05:55), Max: 36.7 (19 Feb 2024 19:08)  T(F): 97.6 (20 Feb 2024 05:55), Max: 98 (19 Feb 2024 19:08)  HR: 79 (20 Feb 2024 05:55) (63 - 80)  BP: 141/81 (20 Feb 2024 05:55) (126/70 - 155/80)  BP(mean): --  RR: 16 (20 Feb 2024 05:55) (16 - 20)  SpO2: 100% (20 Feb 2024 05:55) (95% - 100%)    Parameters below as of 20 Feb 2024 05:55  Patient On (Oxygen Delivery Method): room air

## 2024-02-20 NOTE — PROGRESS NOTE ADULT - SUBJECTIVE AND OBJECTIVE BOX
Name of Patient : KALA VALLEJO  MRN: [id number]  Date of visit: 02-20-24       Subjective: Patient seen and examined. No new events except as noted.   doing okay    REVIEW OF SYSTEMS:    CONSTITUTIONAL: No weakness, fevers or chills  EYES/ENT: No visual changes;  No vertigo or throat pain   NECK: No pain or stiffness  RESPIRATORY: No cough, wheezing, hemoptysis; No shortness of breath  CARDIOVASCULAR: No chest pain or palpitations  GASTROINTESTINAL: No abdominal or epigastric pain. No nausea, vomiting, or hematemesis; No diarrhea or constipation. No melena or hematochezia.  GENITOURINARY: No dysuria, frequency or hematuria  NEUROLOGICAL: No numbness or weakness  SKIN: No itching, burning, rashes, or lesions   All other review of systems is negative unless indicated above.    MEDICATIONS:  MEDICATIONS  (STANDING):  amLODIPine   Tablet 2.5 milliGRAM(s) Oral daily  atenolol  Tablet 50 milliGRAM(s) Oral daily  budesonide  80 MICROgram(s)/formoterol 4.5 MICROgram(s) Inhaler 2 Puff(s) Inhalation two times a day  dexAMETHasone  Injectable 4 milliGRAM(s) IV Push every 6 hours  FLUoxetine 10 milliGRAM(s) Oral daily  tiotropium 2.5 MICROgram(s) Inhaler 2 Puff(s) Inhalation daily  valsartan 160 milliGRAM(s) Oral daily      PHYSICAL EXAM:  T(C): 36.4 (02-20-24 @ 20:02), Max: 37.3 (02-20-24 @ 18:46)  HR: 76 (02-20-24 @ 20:02) (63 - 80)  BP: 144/68 (02-20-24 @ 20:02) (120/95 - 144/68)  RR: 17 (02-20-24 @ 20:02) (16 - 18)  SpO2: 97% (02-20-24 @ 20:02) (95% - 100%)  Wt(kg): --  I&O's Summary    Height (cm): 157.5 (02-20 @ 05:23)  Weight (kg): 56.699 (02-20 @ 05:23)  BMI (kg/m2): 22.9 (02-20 @ 05:23)  BSA (m2): 1.57 (02-20 @ 05:23)    Appearance: Normal	  HEENT:  PERRLA   Lymphatic: No lymphadenopathy   Cardiovascular: Normal S1 S2, no JVD  Respiratory: normal effort , clear  Gastrointestinal:  Soft, Non-tender  Skin: No rashes,  warm to touch  Psychiatry:  Mood & affect appropriate  Musculuskeletal: No edema    recent labs, Imaging and EKGs personally reviewed                           14.3   4.57  )-----------( 320      ( 19 Feb 2024 22:13 )             43.5               02-19    142  |  107  |  15  ----------------------------<  117<H>  3.8   |  25  |  0.52    Ca    9.5      19 Feb 2024 22:13    TPro  7.2  /  Alb  4.3  /  TBili  0.9  /  DBili  x   /  AST  42<H>  /  ALT  76<H>  /  AlkPhos  188<H>  02-19    PT/INR - ( 19 Feb 2024 22:13 )   PT: 12.4 sec;   INR: 1.10 ratio         PTT - ( 19 Feb 2024 22:13 )  PTT:36.4 sec                   Urinalysis Basic - ( 19 Feb 2024 22:13 )    Color: x / Appearance: x / SG: x / pH: x  Gluc: 117 mg/dL / Ketone: x  / Bili: x / Urobili: x   Blood: x / Protein: x / Nitrite: x   Leuk Esterase: x / RBC: x / WBC x   Sq Epi: x / Non Sq Epi: x / Bacteria: x

## 2024-02-21 ENCOUNTER — TRANSCRIPTION ENCOUNTER (OUTPATIENT)
Age: 79
End: 2024-02-21

## 2024-02-21 LAB
A1C WITH ESTIMATED AVERAGE GLUCOSE RESULT: 4.6 % — SIGNIFICANT CHANGE UP (ref 4–5.6)
ADD ON TEST-SPECIMEN IN LAB: SIGNIFICANT CHANGE UP
ALBUMIN SERPL ELPH-MCNC: 3.6 G/DL — SIGNIFICANT CHANGE UP (ref 3.3–5)
ALP SERPL-CCNC: 146 U/L — HIGH (ref 40–120)
ALT FLD-CCNC: 48 U/L — HIGH (ref 4–33)
ANION GAP SERPL CALC-SCNC: 9 MMOL/L — SIGNIFICANT CHANGE UP (ref 7–14)
AST SERPL-CCNC: 24 U/L — SIGNIFICANT CHANGE UP (ref 4–32)
BASOPHILS # BLD AUTO: 0 K/UL — SIGNIFICANT CHANGE UP (ref 0–0.2)
BASOPHILS NFR BLD AUTO: 0 % — SIGNIFICANT CHANGE UP (ref 0–2)
BILIRUB SERPL-MCNC: 0.7 MG/DL — SIGNIFICANT CHANGE UP (ref 0.2–1.2)
BUN SERPL-MCNC: 30 MG/DL — HIGH (ref 7–23)
CALCIUM SERPL-MCNC: 9.3 MG/DL — SIGNIFICANT CHANGE UP (ref 8.4–10.5)
CHLORIDE SERPL-SCNC: 109 MMOL/L — HIGH (ref 98–107)
CO2 SERPL-SCNC: 26 MMOL/L — SIGNIFICANT CHANGE UP (ref 22–31)
CREAT SERPL-MCNC: 0.56 MG/DL — SIGNIFICANT CHANGE UP (ref 0.5–1.3)
EGFR: 94 ML/MIN/1.73M2 — SIGNIFICANT CHANGE UP
EOSINOPHIL # BLD AUTO: 0 K/UL — SIGNIFICANT CHANGE UP (ref 0–0.5)
EOSINOPHIL NFR BLD AUTO: 0 % — SIGNIFICANT CHANGE UP (ref 0–6)
ESTIMATED AVERAGE GLUCOSE: 85 — SIGNIFICANT CHANGE UP
GLUCOSE SERPL-MCNC: 144 MG/DL — HIGH (ref 70–99)
HCT VFR BLD CALC: 39.9 % — SIGNIFICANT CHANGE UP (ref 34.5–45)
HGB BLD-MCNC: 12.9 G/DL — SIGNIFICANT CHANGE UP (ref 11.5–15.5)
IANC: 4.48 K/UL — SIGNIFICANT CHANGE UP (ref 1.8–7.4)
IMM GRANULOCYTES NFR BLD AUTO: 0.4 % — SIGNIFICANT CHANGE UP (ref 0–0.9)
LYMPHOCYTES # BLD AUTO: 0.25 K/UL — LOW (ref 1–3.3)
LYMPHOCYTES # BLD AUTO: 5.2 % — LOW (ref 13–44)
MAGNESIUM SERPL-MCNC: 2.2 MG/DL — SIGNIFICANT CHANGE UP (ref 1.6–2.6)
MCHC RBC-ENTMCNC: 28.2 PG — SIGNIFICANT CHANGE UP (ref 27–34)
MCHC RBC-ENTMCNC: 32.3 GM/DL — SIGNIFICANT CHANGE UP (ref 32–36)
MCV RBC AUTO: 87.3 FL — SIGNIFICANT CHANGE UP (ref 80–100)
MONOCYTES # BLD AUTO: 0.05 K/UL — SIGNIFICANT CHANGE UP (ref 0–0.9)
MONOCYTES NFR BLD AUTO: 1 % — LOW (ref 2–14)
NEUTROPHILS # BLD AUTO: 4.48 K/UL — SIGNIFICANT CHANGE UP (ref 1.8–7.4)
NEUTROPHILS NFR BLD AUTO: 93.4 % — HIGH (ref 43–77)
NRBC # BLD: 0 /100 WBCS — SIGNIFICANT CHANGE UP (ref 0–0)
NRBC # FLD: 0 K/UL — SIGNIFICANT CHANGE UP (ref 0–0)
PHOSPHATE SERPL-MCNC: 2.6 MG/DL — SIGNIFICANT CHANGE UP (ref 2.5–4.5)
PLATELET # BLD AUTO: 305 K/UL — SIGNIFICANT CHANGE UP (ref 150–400)
POTASSIUM SERPL-MCNC: 4.2 MMOL/L — SIGNIFICANT CHANGE UP (ref 3.5–5.3)
POTASSIUM SERPL-SCNC: 4.2 MMOL/L — SIGNIFICANT CHANGE UP (ref 3.5–5.3)
PROT SERPL-MCNC: 6.1 G/DL — SIGNIFICANT CHANGE UP (ref 6–8.3)
RBC # BLD: 4.57 M/UL — SIGNIFICANT CHANGE UP (ref 3.8–5.2)
RBC # FLD: 14.3 % — SIGNIFICANT CHANGE UP (ref 10.3–14.5)
SODIUM SERPL-SCNC: 144 MMOL/L — SIGNIFICANT CHANGE UP (ref 135–145)
WBC # BLD: 4.8 K/UL — SIGNIFICANT CHANGE UP (ref 3.8–10.5)
WBC # FLD AUTO: 4.8 K/UL — SIGNIFICANT CHANGE UP (ref 3.8–10.5)

## 2024-02-21 PROCEDURE — 99223 1ST HOSP IP/OBS HIGH 75: CPT

## 2024-02-21 RX ORDER — RIVAROXABAN 15 MG-20MG
1 KIT ORAL
Qty: 0 | Refills: 3 | DISCHARGE

## 2024-02-21 RX ORDER — DEXAMETHASONE 0.5 MG/5ML
4 ELIXIR ORAL
Qty: 1 | Refills: 0
Start: 2024-02-21 | End: 2024-03-21

## 2024-02-21 RX ORDER — PANTOPRAZOLE SODIUM 20 MG/1
1 TABLET, DELAYED RELEASE ORAL
Qty: 0 | Refills: 0 | DISCHARGE
Start: 2024-02-21

## 2024-02-21 RX ADMIN — VALSARTAN 160 MILLIGRAM(S): 80 TABLET ORAL at 05:20

## 2024-02-21 RX ADMIN — Medication 4 MILLIGRAM(S): at 18:02

## 2024-02-21 RX ADMIN — AMLODIPINE BESYLATE 2.5 MILLIGRAM(S): 2.5 TABLET ORAL at 05:19

## 2024-02-21 RX ADMIN — PANTOPRAZOLE SODIUM 40 MILLIGRAM(S): 20 TABLET, DELAYED RELEASE ORAL at 05:19

## 2024-02-21 RX ADMIN — Medication 10 MILLIGRAM(S): at 14:45

## 2024-02-21 RX ADMIN — ATENOLOL 50 MILLIGRAM(S): 25 TABLET ORAL at 05:19

## 2024-02-21 RX ADMIN — Medication 4 MILLIGRAM(S): at 01:54

## 2024-02-21 RX ADMIN — BUDESONIDE AND FORMOTEROL FUMARATE DIHYDRATE 2 PUFF(S): 160; 4.5 AEROSOL RESPIRATORY (INHALATION) at 20:56

## 2024-02-21 RX ADMIN — Medication 4 MILLIGRAM(S): at 07:02

## 2024-02-21 RX ADMIN — TIOTROPIUM BROMIDE 2 PUFF(S): 18 CAPSULE ORAL; RESPIRATORY (INHALATION) at 09:20

## 2024-02-21 RX ADMIN — BUDESONIDE AND FORMOTEROL FUMARATE DIHYDRATE 2 PUFF(S): 160; 4.5 AEROSOL RESPIRATORY (INHALATION) at 09:20

## 2024-02-21 RX ADMIN — Medication 4 MILLIGRAM(S): at 12:26

## 2024-02-21 NOTE — PROGRESS NOTE ADULT - SUBJECTIVE AND OBJECTIVE BOX
Name of Patient : KALA VALLEJO  MRN: [id number]  Date of visit: 02-21-24       Subjective: Patient seen and examined. No new events except as noted.   Doing okay    REVIEW OF SYSTEMS:    CONSTITUTIONAL: No weakness, fevers or chills  EYES/ENT: No visual changes;  No vertigo or throat pain   NECK: No pain or stiffness  RESPIRATORY: No cough, wheezing, hemoptysis; No shortness of breath  CARDIOVASCULAR: No chest pain or palpitations  GASTROINTESTINAL: No abdominal or epigastric pain. No nausea, vomiting, or hematemesis; No diarrhea or constipation. No melena or hematochezia.  GENITOURINARY: No dysuria, frequency or hematuria  NEUROLOGICAL: No numbness or weakness  SKIN: No itching, burning, rashes, or lesions   All other review of systems is negative unless indicated above.    MEDICATIONS:  MEDICATIONS  (STANDING):  amLODIPine   Tablet 2.5 milliGRAM(s) Oral daily  atenolol  Tablet 50 milliGRAM(s) Oral daily  budesonide  80 MICROgram(s)/formoterol 4.5 MICROgram(s) Inhaler 2 Puff(s) Inhalation two times a day  dexAMETHasone  Injectable 4 milliGRAM(s) IV Push every 6 hours  FLUoxetine 10 milliGRAM(s) Oral daily  pantoprazole    Tablet 40 milliGRAM(s) Oral before breakfast  tiotropium 2.5 MICROgram(s) Inhaler 2 Puff(s) Inhalation daily  valsartan 160 milliGRAM(s) Oral daily      PHYSICAL EXAM:  T(C): 36.3 (02-21-24 @ 17:15), Max: 36.7 (02-21-24 @ 00:48)  HR: 65 (02-21-24 @ 17:15) (63 - 78)  BP: 158/83 (02-21-24 @ 17:15) (124/76 - 158/83)  RR: 17 (02-21-24 @ 17:15) (17 - 19)  SpO2: 96% (02-21-24 @ 17:15) (96% - 99%)  Wt(kg): --  I&O's Summary    21 Feb 2024 07:01  -  21 Feb 2024 22:20  --------------------------------------------------------  IN: 360 mL / OUT: 0 mL / NET: 360 mL      Height (cm): 160 (02-21 @ 01:09)    Appearance: Normal	  HEENT:  PERRLA   Lymphatic: No lymphadenopathy   Cardiovascular: Normal S1 S2, no JVD  Respiratory: normal effort , clear  Gastrointestinal:  Soft, Non-tender  Skin: No rashes,  warm to touch  Psychiatry:  Mood & affect appropriate  Musculuskeletal: No edema    recent labs, Imaging and EKGs personally reviewed     02-21-24 @ 07:01  -  02-21-24 @ 22:20  --------------------------------------------------------  IN: 360 mL / OUT: 0 mL / NET: 360 mL                          12.9   4.80  )-----------( 305      ( 21 Feb 2024 04:49 )             39.9               02-21    144  |  109<H>  |  30<H>  ----------------------------<  144<H>  4.2   |  26  |  0.56    Ca    9.3      21 Feb 2024 04:49  Phos  2.6     02-21  Mg     2.20     02-21    TPro  6.1  /  Alb  3.6  /  TBili  0.7  /  DBili  x   /  AST  24  /  ALT  48<H>  /  AlkPhos  146<H>  02-21                       Urinalysis Basic - ( 21 Feb 2024 04:49 )    Color: x / Appearance: x / SG: x / pH: x  Gluc: 144 mg/dL / Ketone: x  / Bili: x / Urobili: x   Blood: x / Protein: x / Nitrite: x   Leuk Esterase: x / RBC: x / WBC x   Sq Epi: x / Non Sq Epi: x / Bacteria: x

## 2024-02-21 NOTE — DISCHARGE NOTE PROVIDER - REASON FOR ADMISSION
Unsteady gait and trip and falls Calcipotriene Counseling:  I discussed with the patient the risks of calcipotriene including but not limited to erythema, scaling, itching, and irritation. Minocycline Pregnancy And Lactation Text: This medication is Pregnancy Category D and not consider safe during pregnancy. It is also excreted in breast milk. Prednisone Counseling:  I discussed with the patient the risks of prolonged use of prednisone including but not limited to weight gain, insomnia, osteoporosis, mood changes, diabetes, susceptibility to infection, glaucoma and high blood pressure.  In cases where prednisone use is prolonged, patients should be monitored with blood pressure checks, serum glucose levels and an eye exam.  Additionally, the patient may need to be placed on GI prophylaxis, PCP prophylaxis, and calcium and vitamin D supplementation and/or a bisphosphonate.  The patient verbalized understanding of the proper use and the possible adverse effects of prednisone.  All of the patient's questions and concerns were addressed. Gabapentin Pregnancy And Lactation Text: This medication is Pregnancy Category C and isn't considered safe during pregnancy. It is excreted in breast milk. Cosentyx Counseling:  I discussed with the patient the risks of Cosentyx including but not limited to worsening of Crohn's disease, immunosuppression, allergic reactions and infections.  The patient understands that monitoring is required including a PPD at baseline and must alert us or the primary physician if symptoms of infection or other concerning signs are noted. Methotrexate Pregnancy And Lactation Text: This medication is Pregnancy Category X and is known to cause fetal harm. This medication is excreted in breast milk. Include Pregnancy/Lactation Warning?: No Cimzia Pregnancy And Lactation Text: This medication crosses the placenta but can be considered safe in certain situations. Cimzia may be excreted in breast milk. Picato Counseling:  I discussed with the patient the risks of Picato including but not limited to erythema, scaling, itching, weeping, crusting, and pain. Terbinafine Counseling: Patient counseling regarding adverse effects of terbinafine including but not limited to headache, diarrhea, rash, upset stomach, liver function test abnormalities, itching, taste/smell disturbance, nausea, abdominal pain, and flatulence.  There is a rare possibility of liver failure that can occur when taking terbinafine.  The patient understands that a baseline LFT and kidney function test may be required. The patient verbalized understanding of the proper use and possible adverse effects of terbinafine.  All of the patient's questions and concerns were addressed. Taltz Counseling: I discussed with the patient the risks of ixekizumab including but not limited to immunosuppression, serious infections, worsening of inflammatory bowel disease and drug reactions.  The patient understands that monitoring is required including a PPD at baseline and must alert us or the primary physician if symptoms of infection or other concerning signs are noted. Propranolol Pregnancy And Lactation Text: This medication is Pregnancy Category C and it isn't known if it is safe during pregnancy. It is excreted in breast milk. Quinolones Counseling:  I discussed with the patient the risks of fluoroquinolones including but not limited to GI upset, allergic reaction, drug rash, diarrhea, dizziness, photosensitivity, yeast infections, liver function test abnormalities, tendonitis/tendon rupture. Prednisone Pregnancy And Lactation Text: This medication is Pregnancy Category C and it isn't know if it is safe during pregnancy. This medication is excreted in breast milk. Calcipotriene Pregnancy And Lactation Text: This medication has not been proven safe during pregnancy. It is unknown if this medication is excreted in breast milk. Glycopyrrolate Counseling:  I discussed with the patient the risks of glycopyrrolate including but not limited to skin rash, drowsiness, dry mouth, difficulty urinating, and blurred vision. Tremfya Counseling: I discussed with the patient the risks of guselkumab including but not limited to immunosuppression, serious infections, worsening of inflammatory bowel disease and drug reactions.  The patient understands that monitoring is required including a PPD at baseline and must alert us or the primary physician if symptoms of infection or other concerning signs are noted. Zyclara Pregnancy And Lactation Text: This medication is Pregnancy Category C. It is unknown if this medication is excreted in breast milk. Terbinafine Pregnancy And Lactation Text: This medication is Pregnancy Category B and is considered safe during pregnancy. It is also excreted in breast milk and breast feeding isn't recommended. Taltz Pregnancy And Lactation Text: The risk during pregnancy and breastfeeding is uncertain with this medication. Birth Control Pills Counseling: Birth Control Pill Counseling: I discussed with the patient the potential side effects of OCPs including but not limited to increased risk of stroke, heart attack, thrombophlebitis, deep venous thrombosis, hepatic adenomas, breast changes, GI upset, headaches, and depression.  The patient verbalized understanding of the proper use and possible adverse effects of OCPs. All of the patient's questions and concerns were addressed. 5-Fu Counseling: 5-Fluorouracil Counseling:  I discussed with the patient the risks of 5-fluorouracil including but not limited to erythema, scaling, itching, weeping, crusting, and pain. Dupixent Counseling: I discussed with the patient the risks of dupilumab including but not limited to eye infection and irritation, cold sores, injection site reactions, worsening of asthma, allergic reactions and increased risk of parasitic infection.  Live vaccines should be avoided while taking dupilumab. Dupilumab will also interact with certain medications such as warfarin and cyclosporine. The patient understands that monitoring is required and they must alert us or the primary physician if symptoms of infection or other concerning signs are noted. Opioid Counseling: I discussed with the patient the potential side effects of opioids including but not limited to addiction, altered mental status, and depression. I stressed avoiding alcohol, benzodiazepines, muscle relaxants and sleep aids unless specifically okayed by a physician. The patient verbalized understanding of the proper use and possible adverse effects of opioids. All of the patient's questions and concerns were addressed. They were instructed to flush the remaining pills down the toilet if they did not need them for pain. Spironolactone Counseling: Patient advised regarding risks of diarrhea, abdominal pain, hyperkalemia, birth defects (for female patients), liver toxicity and renal toxicity. The patient may need blood work to monitor liver and kidney function and potassium levels while on therapy. The patient verbalized understanding of the proper use and possible adverse effects of spironolactone.  All of the patient's questions and concerns were addressed. Cosentyx Pregnancy And Lactation Text: This medication is Pregnancy Category B and is considered safe during pregnancy. It is unknown if this medication is excreted in breast milk. Birth Control Pills Pregnancy And Lactation Text: This medication should be avoided if pregnant and for the first 30 days post-partum. Quinolones Pregnancy And Lactation Text: This medication is Pregnancy Category C and it isn't know if it is safe during pregnancy. It is also excreted in breast milk. Protopic Counseling: Patient may experience a mild burning sensation during topical application. Protopic is not approved in children less than 2 years of age. There have been case reports of hematologic and skin malignancies in patients using topical calcineurin inhibitors although causality is questionable. Glycopyrrolate Pregnancy And Lactation Text: This medication is Pregnancy Category B and is considered safe during pregnancy. It is unknown if it is excreted breast milk. Opioid Pregnancy And Lactation Text: These medications can lead to premature delivery and should be avoided during pregnancy. These medications are also present in breast milk in small amounts. Azithromycin Counseling:  I discussed with the patient the risks of azithromycin including but not limited to GI upset, allergic reaction, drug rash, diarrhea, and yeast infections. Rifampin Counseling: I discussed with the patient the risks of rifampin including but not limited to liver damage, kidney damage, red-orange body fluids, nausea/vomiting and severe allergy. Spironolactone Pregnancy And Lactation Text: This medication can cause feminization of the male fetus and should be avoided during pregnancy. The active metabolite is also found in breast milk. Rhofade Counseling: Rhofade is a topical medication which can decrease superficial blood flow where applied. Side effects are uncommon and include stinging, redness and allergic reactions. Dupixent Pregnancy And Lactation Text: This medication likely crosses the placenta but the risk for the fetus is uncertain. This medication is excreted in breast milk. Protopic Pregnancy And Lactation Text: This medication is Pregnancy Category C. It is unknown if this medication is excreted in breast milk when applied topically. Hydroxychloroquine Counseling:  I discussed with the patient that a baseline ophthalmologic exam is needed at the start of therapy and every year thereafter while on therapy. A CBC may also be warranted for monitoring.  The side effects of this medication were discussed with the patient, including but not limited to agranulocytosis, aplastic anemia, seizures, rashes, retinopathy, and liver toxicity. Patient instructed to call the office should any adverse effect occur.  The patient verbalized understanding of the proper use and possible adverse effects of Plaquenil.  All the patient's questions and concerns were addressed. Cimetidine Counseling:  I discussed with the patient the risks of Cimetidine including but not limited to gynecomastia, headache, diarrhea, nausea, drowsiness, arrhythmias, pancreatitis, skin rashes, psychosis, bone marrow suppression and kidney toxicity. 5-Fu Pregnancy And Lactation Text: This medication is Pregnancy Category X and contraindicated in pregnancy and in women who may become pregnant. It is unknown if this medication is excreted in breast milk. Enbrel Counseling:  I discussed with the patient the risks of etanercept including but not limited to myelosuppression, immunosuppression, autoimmune hepatitis, demyelinating diseases, lymphoma, and infections.  The patient understands that monitoring is required including a PPD at baseline and must alert us or the primary physician if symptoms of infection or other concerning signs are noted. Azithromycin Pregnancy And Lactation Text: This medication is considered safe during pregnancy and is also secreted in breast milk. Rhofade Pregnancy And Lactation Text: This medication has not been assigned a Pregnancy Risk Category. It is unknown if the medication is excreted in breast milk. SSKI Counseling:  I discussed with the patient the risks of SSKI including but not limited to thyroid abnormalities, metallic taste, GI upset, fever, headache, acne, arthralgias, paraesthesias, lymphadenopathy, easy bleeding, arrhythmias, and allergic reaction. Doxepin Counseling:  Patient advised that the medication is sedating and not to drive a car after taking this medication. Patient informed of potential adverse effects including but not limited to dry mouth, urinary retention, and blurry vision.  The patient verbalized understanding of the proper use and possible adverse effects of doxepin.  All of the patient's questions and concerns were addressed. Xelbyronz Pregnancy And Lactation Text: This medication is Pregnancy Category D and is not considered safe during pregnancy.  The risk during breast feeding is also uncertain. Libtayo Counseling- I discussed with the patient the risks of Libtayo including but not limited to nausea, vomiting, diarrhea, and bone or muscle pain.  The patient verbalized understanding of the proper use and possible adverse effects of Libtayo.  All of the patient's questions and concerns were addressed. Xeljanz Counseling: I discussed with the patient the risks of Xeljanz therapy including increased risk of infection, liver issues, headache, diarrhea, or cold symptoms. Live vaccines should be avoided. They were instructed to call if they have any problems. Hydroxychloroquine Pregnancy And Lactation Text: This medication has been shown to cause fetal harm but it isn't assigned a Pregnancy Risk Category. There are small amounts excreted in breast milk. Drysol Counseling:  I discussed with the patient the risks of drysol/aluminum chloride including but not limited to skin rash, itching, irritation, burning. Rifampin Pregnancy And Lactation Text: This medication is Pregnancy Category C and it isn't know if it is safe during pregnancy. It is also excreted in breast milk and should not be used if you are breast feeding. Solaraze Counseling:  I discussed with the patient the risks of Solaraze including but not limited to erythema, scaling, itching, weeping, crusting, and pain. Xolair Counseling:  Patient informed of potential adverse effects including but not limited to fever, muscle aches, rash and allergic reactions.  The patient verbalized understanding of the proper use and possible adverse effects of Xolair.  All of the patient's questions and concerns were addressed. Bactrim Counseling:  I discussed with the patient the risks of sulfa antibiotics including but not limited to GI upset, allergic reaction, drug rash, diarrhea, dizziness, photosensitivity, and yeast infections.  Rarely, more serious reactions can occur including but not limited to aplastic anemia, agranulocytosis, methemoglobinemia, blood dyscrasias, liver or kidney failure, lung infiltrates or desquamative/blistering drug rashes. Libtayo Pregnancy And Lactation Text: This medication is contraindicated in pregnancy and when breast feeding. Doxepin Pregnancy And Lactation Text: This medication is Pregnancy Category C and it isn't known if it is safe during pregnancy. It is also excreted in breast milk and breast feeding isn't recommended. Elidel Counseling: Patient may experience a mild burning sensation during topical application. Elidel is not approved in children less than 2 years of age. There have been case reports of hematologic and skin malignancies in patients using topical calcineurin inhibitors although causality is questionable. Drysol Pregnancy And Lactation Text: This medication is considered safe during pregnancy and breast feeding. Arava Counseling:  Patient counseled regarding adverse effects of Arava including but not limited to nausea, vomiting, abnormalities in liver function tests. Patients may develop mouth sores, rash, diarrhea, and abnormalities in blood counts. The patient understands that monitoring is required including LFTs and blood counts.  There is a rare possibility of scarring of the liver and lung problems that can occur when taking methotrexate. Persistent nausea, loss of appetite, pale stools, dark urine, cough, and shortness of breath should be reported immediately. Patient advised to discontinue Arava treatment and consult with a physician prior to attempting conception. The patient will have to undergo a treatment to eliminate Arava from the body prior to conception. Acitretin Counseling:  I discussed with the patient the risks of acitretin including but not limited to hair loss, dry lips/skin/eyes, liver damage, hyperlipidemia, depression/suicidal ideation, photosensitivity.  Serious rare side effects can include but are not limited to pancreatitis, pseudotumor cerebri, bony changes, clot formation/stroke/heart attack.  Patient understands that alcohol is contraindicated since it can result in liver toxicity and significantly prolong the elimination of the drug by many years. Sarecycline Counseling: Patient advised regarding possible photosensitivity and discoloration of the teeth, skin, lips, tongue and gums.  Patient instructed to avoid sunlight, if possible.  When exposed to sunlight, patients should wear protective clothing, sunglasses, and sunscreen.  The patient was instructed to call the office immediately if the following severe adverse effects occur:  hearing changes, easy bruising/bleeding, severe headache, or vision changes.  The patient verbalized understanding of the proper use and possible adverse effects of sarecycline.  All of the patient's questions and concerns were addressed. Sski Pregnancy And Lactation Text: This medication is Pregnancy Category D and isn't considered safe during pregnancy. It is excreted in breast milk. Xolair Pregnancy And Lactation Text: This medication is Pregnancy Category B and is considered safe during pregnancy. This medication is excreted in breast milk. Niacinamide Counseling: I recommended taking niacin or niacinamide, also know as vitamin B3, twice daily. Recent evidence suggests that taking vitamin B3 (500 mg twice daily) can reduce the risk of actinic keratoses and non-melanoma skin cancers. Side effects of vitamin B3 include flushing and headache. Hydroxyzine Counseling: Patient advised that the medication is sedating and not to drive a car after taking this medication.  Patient informed of potential adverse effects including but not limited to dry mouth, urinary retention, and blurry vision.  The patient verbalized understanding of the proper use and possible adverse effects of hydroxyzine.  All of the patient's questions and concerns were addressed. Thalidomide Counseling: I discussed with the patient the risks of thalidomide including but not limited to birth defects, anxiety, weakness, chest pain, dizziness, cough and severe allergy. Tetracycline Counseling: Patient counseled regarding possible photosensitivity and increased risk for sunburn.  Patient instructed to avoid sunlight, if possible.  When exposed to sunlight, patients should wear protective clothing, sunglasses, and sunscreen.  The patient was instructed to call the office immediately if the following severe adverse effects occur:  hearing changes, easy bruising/bleeding, severe headache, or vision changes.  The patient verbalized understanding of the proper use and possible adverse effects of tetracycline.  All of the patient's questions and concerns were addressed. Patient understands to avoid pregnancy while on therapy due to potential birth defects. Clofazimine Counseling:  I discussed with the patient the risks of clofazimine including but not limited to skin and eye pigmentation, liver damage, nausea/vomiting, gastrointestinal bleeding and allergy. Arava Pregnancy And Lactation Text: This medication is Pregnancy Category X and is absolutely contraindicated during pregnancy. It is unknown if it is excreted in breast milk. Acitretin Pregnancy And Lactation Text: This medication is Pregnancy Category X and should not be given to women who are pregnant or may become pregnant in the future. This medication is excreted in breast milk. Bactrim Pregnancy And Lactation Text: This medication is Pregnancy Category D and is known to cause fetal risk.  It is also excreted in breast milk. Solaraze Pregnancy And Lactation Text: This medication is Pregnancy Category B and is considered safe. There is some data to suggest avoiding during the third trimester. It is unknown if this medication is excreted in breast milk. Humira Counseling:  I discussed with the patient the risks of adalimumab including but not limited to myelosuppression, immunosuppression, autoimmune hepatitis, demyelinating diseases, lymphoma, and serious infections.  The patient understands that monitoring is required including a PPD at baseline and must alert us or the primary physician if symptoms of infection or other concerning signs are noted. Eucrisa Counseling: Patient may experience a mild burning sensation during topical application. Eucrisa is not approved in children less than 2 years of age. Rituxan Counseling:  I discussed with the patient the risks of Rituxan infusions. Side effects can include infusion reactions, severe drug rashes including mucocutaneous reactions, reactivation of latent hepatitis and other infections and rarely progressive multifocal leukoencephalopathy.  All of the patient's questions and concerns were addressed. Ilumya Counseling: I discussed with the patient the risks of tildrakizumab including but not limited to immunosuppression, malignancy, posterior leukoencephalopathy syndrome, and serious infections.  The patient understands that monitoring is required including a PPD at baseline and must alert us or the primary physician if symptoms of infection or other concerning signs are noted. Niacinamide Pregnancy And Lactation Text: These medications are considered safe during pregnancy. Bexarotene Pregnancy And Lactation Text: This medication is Pregnancy Category X and should not be given to women who are pregnant or may become pregnant. This medication should not be used if you are breast feeding. Bexarotene Counseling:  I discussed with the patient the risks of bexarotene including but not limited to hair loss, dry lips/skin/eyes, liver abnormalities, hyperlipidemia, pancreatitis, depression/suicidal ideation, photosensitivity, drug rash/allergic reactions, hypothyroidism, anemia, leukopenia, infection, cataracts, and teratogenicity.  Patient understands that they will need regular blood tests to check lipid profile, liver function tests, white blood cell count, thyroid function tests and pregnancy test if applicable. Cephalexin Counseling: I counseled the patient regarding use of cephalexin as an antibiotic for prophylactic and/or therapeutic purposes. Cephalexin (commonly prescribed under brand name Keflex) is a cephalosporin antibiotic which is active against numerous classes of bacteria, including most skin bacteria. Side effects may include nausea, diarrhea, gastrointestinal upset, rash, hives, yeast infections, and in rare cases, hepatitis, kidney disease, seizures, fever, confusion, neurologic symptoms, and others. Patients with severe allergies to penicillin medications are cautioned that there is about a 10% incidence of cross-reactivity with cephalosporins. When possible, patients with penicillin allergies should use alternatives to cephalosporins for antibiotic therapy. Topical Retinoid counseling:  Patient advised to apply a pea-sized amount only at bedtime and wait 30 minutes after washing their face before applying.  If too drying, patient may add a non-comedogenic moisturizer. The patient verbalized understanding of the proper use and possible adverse effects of retinoids.  All of the patient's questions and concerns were addressed. Hydroxyzine Pregnancy And Lactation Text: This medication is not safe during pregnancy and should not be taken. It is also excreted in breast milk and breast feeding isn't recommended. Isotretinoin Counseling: Patient should get monthly blood tests, not donate blood, not drive at night if vision affected, not share medication, and not undergo elective surgery for 6 months after tx completed. Side effects reviewed, pt to contact office should one occur. Nsaids Counseling: NSAID Counseling: I discussed with the patient that NSAIDs should be taken with food. Prolonged use of NSAIDs can result in the development of stomach ulcers.  Patient advised to stop taking NSAIDs if abdominal pain occurs.  The patient verbalized understanding of the proper use and possible adverse effects of NSAIDs.  All of the patient's questions and concerns were addressed. Clindamycin Counseling: I counseled the patient regarding use of clindamycin as an antibiotic for prophylactic and/or therapeutic purposes. Clindamycin is active against numerous classes of bacteria, including skin bacteria. Side effects may include nausea, diarrhea, gastrointestinal upset, rash, hives, yeast infections, and in rare cases, colitis. Colchicine Counseling:  Patient counseled regarding adverse effects including but not limited to stomach upset (nausea, vomiting, stomach pain, or diarrhea).  Patient instructed to limit alcohol consumption while taking this medication.  Colchicine may reduce blood counts especially with prolonged use.  The patient understands that monitoring of kidney function and blood counts may be required, especially at baseline. The patient verbalized understanding of the proper use and possible adverse effects of colchicine.  All of the patient's questions and concerns were addressed. Azathioprine Pregnancy And Lactation Text: This medication is Pregnancy Category D and isn't considered safe during pregnancy. It is unknown if this medication is excreted in breast milk. Cephalexin Pregnancy And Lactation Text: This medication is Pregnancy Category B and considered safe during pregnancy.  It is also excreted in breast milk but can be used safely for shorter doses. Azathioprine Counseling:  I discussed with the patient the risks of azathioprine including but not limited to myelosuppression, immunosuppression, hepatotoxicity, lymphoma, and infections.  The patient understands that monitoring is required including baseline LFTs, Creatinine, possible TPMP genotyping and weekly CBCs for the first month and then every 2 weeks thereafter.  The patient verbalized understanding of the proper use and possible adverse effects of azathioprine.  All of the patient's questions and concerns were addressed. Eucrisa Pregnancy And Lactation Text: This medication has not been assigned a Pregnancy Risk Category but animal studies failed to show danger with the topical medication. It is unknown if the medication is excreted in breast milk. Rituxan Pregnancy And Lactation Text: This medication is Pregnancy Category C and it isn't know if it is safe during pregnancy. It is unknown if this medication is excreted in breast milk but similar antibodies are known to be excreted. Clindamycin Pregnancy And Lactation Text: This medication can be used in pregnancy if certain situations. Clindamycin is also present in breast milk. Hydroquinone Counseling:  Patient advised that medication may result in skin irritation, lightening (hypopigmentation), dryness, and burning.  In the event of skin irritation, the patient was advised to reduce the amount of the drug applied or use it less frequently.  Rarely, spots that are treated with hydroquinone can become darker (pseudoochronosis).  Should this occur, patient instructed to stop medication and call the office. The patient verbalized understanding of the proper use and possible adverse effects of hydroquinone.  All of the patient's questions and concerns were addressed. Infliximab Counseling:  I discussed with the patient the risks of infliximab including but not limited to myelosuppression, immunosuppression, autoimmune hepatitis, demyelinating diseases, lymphoma, and serious infections.  The patient understands that monitoring is required including a PPD at baseline and must alert us or the primary physician if symptoms of infection or other concerning signs are noted. Cellcept Counseling:  I discussed with the patient the risks of mycophenolate mofetil including but not limited to infection/immunosuppression, GI upset, hypokalemia, hypercholesterolemia, bone marrow suppression, lymphoproliferative disorders, malignancy, GI ulceration/bleed/perforation, colitis, interstitial lung disease, kidney failure, progressive multifocal leukoencephalopathy, and birth defects.  The patient understands that monitoring is required including a baseline creatinine and regular CBC testing. In addition, patient must alert us immediately if symptoms of infection or other concerning signs are noted. Isotretinoin Pregnancy And Lactation Text: This medication is Pregnancy Category X and is considered extremely dangerous during pregnancy. It is unknown if it is excreted in breast milk. Albendazole Counseling:  I discussed with the patient the risks of albendazole including but not limited to cytopenia, kidney damage, nausea/vomiting and severe allergy.  The patient understands that this medication is being used in an off-label manner. Tazorac Counseling:  Patient advised that medication is irritating and drying.  Patient may need to apply sparingly and wash off after an hour before eventually leaving it on overnight.  The patient verbalized understanding of the proper use and possible adverse effects of tazorac.  All of the patient's questions and concerns were addressed. Fluconazole Counseling:  Patient counseled regarding adverse effects of fluconazole including but not limited to headache, diarrhea, nausea, upset stomach, liver function test abnormalities, taste disturbance, and stomach pain.  There is a rare possibility of liver failure that can occur when taking fluconazole.  The patient understands that monitoring of LFTs and kidney function test may be required, especially at baseline. The patient verbalized understanding of the proper use and possible adverse effects of fluconazole.  All of the patient's questions and concerns were addressed. Siliq Counseling:  I discussed with the patient the risks of Siliq including but not limited to new or worsening depression, suicidal thoughts and behavior, immunosuppression, malignancy, posterior leukoencephalopathy syndrome, and serious infections.  The patient understands that monitoring is required including a PPD at baseline and must alert us or the primary physician if symptoms of infection or other concerning signs are noted. There is also a special program designed to monitor depression which is required with Siliq. Nsaids Pregnancy And Lactation Text: These medications are considered safe up to 30 weeks gestation. It is excreted in breast milk. Dapsone Counseling: I discussed with the patient the risks of dapsone including but not limited to hemolytic anemia, agranulocytosis, rashes, methemoglobinemia, kidney failure, peripheral neuropathy, headaches, GI upset, and liver toxicity.  Patients who start dapsone require monitoring including baseline LFTs and weekly CBCs for the first month, then every month thereafter.  The patient verbalized understanding of the proper use and possible adverse effects of dapsone.  All of the patient's questions and concerns were addressed. Doxycycline Counseling:  Patient counseled regarding possible photosensitivity and increased risk for sunburn.  Patient instructed to avoid sunlight, if possible.  When exposed to sunlight, patients should wear protective clothing, sunglasses, and sunscreen.  The patient was instructed to call the office immediately if the following severe adverse effects occur:  hearing changes, easy bruising/bleeding, severe headache, or vision changes.  The patient verbalized understanding of the proper use and possible adverse effects of doxycycline.  All of the patient's questions and concerns were addressed.\\n\\nDiscussed antibiotic stewardship with patient and associated risks with long term doxycycline use.  Stressed importance of regular lab monitoring while on long term doxycycline. High Dose Vitamin A Counseling: Side effects reviewed, pt to contact office should one occur. Simponi Counseling:  I discussed with the patient the risks of golimumab including but not limited to myelosuppression, immunosuppression, autoimmune hepatitis, demyelinating diseases, lymphoma, and serious infections.  The patient understands that monitoring is required including a PPD at baseline and must alert us or the primary physician if symptoms of infection or other concerning signs are noted. Topical Clindamycin Counseling: Patient counseled that this medication may cause skin irritation or allergic reactions.  In the event of skin irritation, the patient was advised to reduce the amount of the drug applied or use it less frequently.   The patient verbalized understanding of the proper use and possible adverse effects of clindamycin.  All of the patient's questions and concerns were addressed. Tazorac Pregnancy And Lactation Text: This medication is not safe during pregnancy. It is unknown if this medication is excreted in breast milk. Odomzo Counseling- I discussed with the patient the risks of Odomzo including but not limited to nausea, vomiting, diarrhea, constipation, weight loss, changes in the sense of taste, decreased appetite, muscle spasms, and hair loss.  The patient verbalized understanding of the proper use and possible adverse effects of Odomzo.  All of the patient's questions and concerns were addressed. Albendazole Pregnancy And Lactation Text: This medication is Pregnancy Category C and it isn't known if it is safe during pregnancy. It is also excreted in breast milk. High Dose Vitamin A Pregnancy And Lactation Text: High dose vitamin A therapy is contraindicated during pregnancy and breast feeding. Topical Clindamycin Pregnancy And Lactation Text: This medication is Pregnancy Category B and is considered safe during pregnancy. It is unknown if it is excreted in breast milk. Cyclophosphamide Counseling:  I discussed with the patient the risks of cyclophosphamide including but not limited to hair loss, hormonal abnormalities, decreased fertility, abdominal pain, diarrhea, nausea and vomiting, bone marrow suppression and infection. The patient understands that monitoring is required while taking this medication. Otezla Counseling: The side effects of Otezla were discussed with the patient, including but not limited to worsening or new depression, weight loss, diarrhea, nausea, upper respiratory tract infection, and headache. Patient instructed to call the office should any adverse effect occur.  The patient verbalized understanding of the proper use and possible adverse effects of Otezla.  All the patient's questions and concerns were addressed. Doxycycline Pregnancy And Lactation Text: This medication is Pregnancy Category D and not consider safe during pregnancy. It is also excreted in breast milk but is considered safe for shorter treatment courses. Ivermectin Counseling:  Patient instructed to take medication on an empty stomach with a full glass of water.  Patient informed of potential adverse effects including but not limited to nausea, diarrhea, dizziness, itching, and swelling of the extremities or lymph nodes.  The patient verbalized understanding of the proper use and possible adverse effects of ivermectin.  All of the patient's questions and concerns were addressed. Imiquimod Counseling:  I discussed with the patient the risks of imiquimod including but not limited to erythema, scaling, itching, weeping, crusting, and pain.  Patient understands that the inflammatory response to imiquimod is variable from person to person and was educated regarded proper titration schedule.  If flu-like symptoms develop, patient knows to discontinue the medication and contact us. Griseofulvin Counseling:  I discussed with the patient the risks of griseofulvin including but not limited to photosensitivity, cytopenia, liver damage, nausea/vomiting and severe allergy.  The patient understands that this medication is best absorbed when taken with a fatty meal (e.g., ice cream or french fries). Dapsone Pregnancy And Lactation Text: This medication is Pregnancy Category C and is not considered safe during pregnancy or breast feeding. Topical Sulfur Applications Counseling: Topical Sulfur Counseling: Patient counseled that this medication may cause skin irritation or allergic reactions.  In the event of skin irritation, the patient was advised to reduce the amount of the drug applied or use it less frequently.   The patient verbalized understanding of the proper use and possible adverse effects of topical sulfur application.  All of the patient's questions and concerns were addressed. Cyclophosphamide Pregnancy And Lactation Text: This medication is Pregnancy Category D and it isn't considered safe during pregnancy. This medication is excreted in breast milk. Erythromycin Counseling:  I discussed with the patient the risks of erythromycin including but not limited to GI upset, allergic reaction, drug rash, diarrhea, increase in liver enzymes, and yeast infections. Otezla Pregnancy And Lactation Text: This medication is Pregnancy Category C and it isn't known if it is safe during pregnancy. It is unknown if it is excreted in breast milk. Minoxidil Counseling: Minoxidil is a topical medication which can increase blood flow where it is applied. It is uncertain how this medication increases hair growth. Side effects are uncommon and include stinging and allergic reactions. Erivedge Counseling- I discussed with the patient the risks of Erivedge including but not limited to nausea, vomiting, diarrhea, constipation, weight loss, changes in the sense of taste, decreased appetite, muscle spasms, and hair loss.  The patient verbalized understanding of the proper use and possible adverse effects of Erivedge.  All of the patient's questions and concerns were addressed. Tranexamic Acid Counseling:  Patient advised of the small risk of bleeding problems with tranexamic acid. They were also instructed to call if they developed any nausea, vomiting or diarrhea. All of the patient's questions and concerns were addressed. Griseofulvin Pregnancy And Lactation Text: This medication is Pregnancy Category X and is known to cause serious birth defects. It is unknown if this medication is excreted in breast milk but breast feeding should be avoided. Cyclosporine Counseling:  I discussed with the patient the risks of cyclosporine including but not limited to hypertension, gingival hyperplasia,myelosuppression, immunosuppression, liver damage, kidney damage, neurotoxicity, lymphoma, and serious infections. The patient understands that monitoring is required including baseline blood pressure, CBC, CMP, lipid panel and uric acid, and then 1-2 times monthly CMP and blood pressure. Oxybutynin Counseling:  I discussed with the patient the risks of oxybutynin including but not limited to skin rash, drowsiness, dry mouth, difficulty urinating, and blurred vision. Finasteride Counseling:  I discussed with the patient the risks of use of finasteride including but not limited to decreased libido, decreased ejaculate volume, gynecomastia, and depression. Women should not handle medication.  All of the patient's questions and concerns were addressed. Skyrizi Counseling: I discussed with the patient the risks of risankizumab-rzaa including but not limited to immunosuppression, and serious infections.  The patient understands that monitoring is required including a PPD at baseline and must alert us or the primary physician if symptoms of infection or other concerning signs are noted. Itraconazole Counseling:  I discussed with the patient the risks of itraconazole including but not limited to liver damage, nausea/vomiting, neuropathy, and severe allergy.  The patient understands that this medication is best absorbed when taken with acidic beverages such as non-diet cola or ginger ale.  The patient understands that monitoring is required including baseline LFTs and repeat LFTs at intervals.  The patient understands that they are to contact us or the primary physician if concerning signs are noted. Benzoyl Peroxide Counseling: Patient counseled that medicine may cause skin irritation and bleach clothing.  In the event of skin irritation, the patient was advised to reduce the amount of the drug applied or use it less frequently.   The patient verbalized understanding of the proper use and possible adverse effects of benzoyl peroxide.  All of the patient's questions and concerns were addressed. Erythromycin Pregnancy And Lactation Text: This medication is Pregnancy Category B and is considered safe during pregnancy. It is also excreted in breast milk. Tranexamic Acid Pregnancy And Lactation Text: It is unknown if this medication is safe during pregnancy or breast feeding. Topical Sulfur Applications Pregnancy And Lactation Text: This medication is Pregnancy Category C and has an unknown safety profile during pregnancy. It is unknown if this topical medication is excreted in breast milk. Mirvaso Counseling: Mirvaso is a topical medication which can decrease superficial blood flow where applied. Side effects are uncommon and include stinging, redness and allergic reactions. Detail Level: Detailed Stelara Counseling:  I discussed with the patient the risks of ustekinumab including but not limited to immunosuppression, malignancy, posterior leukoencephalopathy syndrome, and serious infections.  The patient understands that monitoring is required including a PPD at baseline and must alert us or the primary physician if symptoms of infection or other concerning signs are noted. Carac Counseling:  I discussed with the patient the risks of Carac including but not limited to erythema, scaling, itching, weeping, crusting, and pain. Finasteride Pregnancy And Lactation Text: This medication is absolutely contraindicated during pregnancy. It is unknown if it is excreted in breast milk. Ketoconazole Counseling:   Patient counseled regarding improving absorption with orange juice.  Adverse effects include but are not limited to breast enlargement, headache, diarrhea, nausea, upset stomach, liver function test abnormalities, taste disturbance, and stomach pain.  There is a rare possibility of liver failure that can occur when taking ketoconazole. The patient understands that monitoring of LFTs may be required, especially at baseline. The patient verbalized understanding of the proper use and possible adverse effects of ketoconazole.  All of the patient's questions and concerns were addressed. Valtrex Pregnancy And Lactation Text: this medication is Pregnancy Category B and is considered safe during pregnancy. This medication is not directly found in breast milk but it's metabolite acyclovir is present. Benzoyl Peroxide Pregnancy And Lactation Text: This medication is Pregnancy Category C. It is unknown if benzoyl peroxide is excreted in breast milk. Metronidazole Counseling:  I discussed with the patient the risks of metronidazole including but not limited to seizures, nausea/vomiting, a metallic taste in the mouth, nausea/vomiting and severe allergy. Wartpeel Counseling:  I discussed with the patient the risks of Wartpeel including but not limited to erythema, scaling, itching, weeping, crusting, and pain. Valtrex Counseling: I discussed with the patient the risks of valacyclovir including but not limited to kidney damage, nausea, vomiting and severe allergy.  The patient understands that if the infection seems to be worsening or is not improving, they are to call. Propranolol Counseling:  I discussed with the patient the risks of propranolol including but not limited to low heart rate, low blood pressure, low blood sugar, restlessness and increased cold sensitivity. They should call the office if they experience any of these side effects. Ketoconazole Pregnancy And Lactation Text: This medication is Pregnancy Category C and it isn't know if it is safe during pregnancy. It is also excreted in breast milk and breast feeding isn't recommended. Minocycline Counseling: Patient advised regarding possible photosensitivity and discoloration of the teeth, skin, lips, tongue and gums.  Patient instructed to avoid sunlight, if possible.  When exposed to sunlight, patients should wear protective clothing, sunglasses, and sunscreen.  The patient was instructed to call the office immediately if the following severe adverse effects occur:  hearing changes, easy bruising/bleeding, severe headache, or vision changes.  The patient verbalized understanding of the proper use and possible adverse effects of minocycline.  All of the patient's questions and concerns were addressed.\\n\\nDiscussed antibiotic stewardship with patient and associated risks with long term minocycline use. Stressed importance of regular lab monitoring while on long term minocycline. Patient will have recent PCP labs faxed over. Patient denies any pigment changes, hearing changes, or dizziness. Instructed patient to continue to taking one week off each month. Gabapentin Counseling: I discussed with the patient the risks of gabapentin including but not limited to dizziness, somnolence, fatigue and ataxia. Zyclara Counseling:  I discussed with the patient the risks of imiquimod including but not limited to erythema, scaling, itching, weeping, crusting, and pain.  Patient understands that the inflammatory response to imiquimod is variable from person to person and was educated regarded proper titration schedule.  If flu-like symptoms develop, patient knows to discontinue the medication and contact us. Methotrexate Counseling:  Patient counseled regarding adverse effects of methotrexate including but not limited to nausea, vomiting, abnormalities in liver function tests. Patients may develop mouth sores, rash, diarrhea, and abnormalities in blood counts. The patient understands that monitoring is required including LFT's and blood counts.  There is a rare possibility of scarring of the liver and lung problems that can occur when taking methotrexate. Persistent nausea, loss of appetite, pale stools, dark urine, cough, and shortness of breath should be reported immediately. Patient advised to discontinue methotrexate treatment at least three months before attempting to become pregnant.  I discussed the need for folate supplements while taking methotrexate.  These supplements can decrease side effects during methotrexate treatment. The patient verbalized understanding of the proper use and possible adverse effects of methotrexate.  All of the patient's questions and concerns were addressed. Metronidazole Pregnancy And Lactation Text: This medication is Pregnancy Category B and considered safe during pregnancy.  It is also excreted in breast milk. Cimzia Counseling:  I discussed with the patient the risks of Cimzia including but not limited to immunosuppression, allergic reactions and infections.  The patient understands that monitoring is required including a PPD at baseline and must alert us or the primary physician if symptoms of infection or other concerning signs are noted.

## 2024-02-21 NOTE — DISCHARGE NOTE PROVIDER - NSDCMRMEDTOKEN_GEN_ALL_CORE_FT
Albuterol (Eqv-ProAir HFA) 90 mcg/inh inhalation aerosol: 2 puff(s) inhaled every 6 hours, As Needed  amLODIPine 2.5 mg oral tablet: 1 tab(s) orally once a day  atenolol 50 mg oral tablet: 1 tab(s) orally once a day  clonazePAM 0.25 mg oral tablet, disintegrating: orally 2 times a day, As Needed  ondansetron 8 mg oral tablet: 1 tab(s) orally 3 times a day, As Needed  PROzac 10 mg oral capsule: 1 cap(s) orally once a day  Trelegy Ellipta 100 mcg-62.5 mcg-25 mcg/inh inhalation powder: 1 puff(s) inhaled once a day  valsartan 160 mg oral tablet: 1 tab(s) orally once a day  XARELTO 20 MG TABLET: 1 each orally once a day   Albuterol (Eqv-ProAir HFA) 90 mcg/inh inhalation aerosol: 2 puff(s) inhaled every 6 hours, As Needed  amLODIPine 2.5 mg oral tablet: 1 tab(s) orally once a day  atenolol 50 mg oral tablet: 1 tab(s) orally once a day  clonazePAM 0.25 mg oral tablet, disintegrating: orally 2 times a day, As Needed  dexAMETHasone 6 mg/25 mL-NaCl 0.9% intravenous solution: 4 milligram(s) intravenous every 6 hours  ondansetron 8 mg oral tablet: 1 tab(s) orally 3 times a day, As Needed  pantoprazole 40 mg oral delayed release tablet: 1 tab(s) orally once a day (before a meal)  PROzac 10 mg oral capsule: 1 cap(s) orally once a day  Trelegy Ellipta 100 mcg-62.5 mcg-25 mcg/inh inhalation powder: 1 puff(s) inhaled once a day  valsartan 160 mg oral tablet: 1 tab(s) orally once a day

## 2024-02-21 NOTE — PROGRESS NOTE ADULT - SUBJECTIVE AND OBJECTIVE BOX
Neurology Progress Note    S: Patient seen and examined. No new events overnight. MRI brain reviewed    Medication:  acetaminophen     Tablet .. 650 milliGRAM(s) Oral every 6 hours PRN  albuterol    90 MICROgram(s) HFA Inhaler 2 Puff(s) Inhalation every 6 hours PRN  aluminum hydroxide/magnesium hydroxide/simethicone Suspension 30 milliLiter(s) Oral every 4 hours PRN  amLODIPine   Tablet 2.5 milliGRAM(s) Oral daily  atenolol  Tablet 50 milliGRAM(s) Oral daily  budesonide  80 MICROgram(s)/formoterol 4.5 MICROgram(s) Inhaler 2 Puff(s) Inhalation two times a day  clonazePAM Oral Disintegrating Tablet 0.25 milliGRAM(s) Oral two times a day PRN  dexAMETHasone  Injectable 4 milliGRAM(s) IV Push every 6 hours  FLUoxetine 10 milliGRAM(s) Oral daily  melatonin 3 milliGRAM(s) Oral at bedtime PRN  pantoprazole    Tablet 40 milliGRAM(s) Oral before breakfast  tiotropium 2.5 MICROgram(s) Inhaler 2 Puff(s) Inhalation daily  valsartan 160 milliGRAM(s) Oral daily      Vitals:  Vital Signs Last 24 Hrs  T(C): 36.6 (21 Feb 2024 11:15), Max: 37.3 (20 Feb 2024 18:46)  T(F): 97.9 (21 Feb 2024 11:15), Max: 99.1 (20 Feb 2024 18:46)  HR: 63 (21 Feb 2024 11:15) (63 - 78)  BP: 158/64 (21 Feb 2024 11:15) (124/67 - 158/64)  BP(mean): --  RR: 19 (21 Feb 2024 11:15) (17 - 19)  SpO2: 97% (21 Feb 2024 11:15) (95% - 99%)    Parameters below as of 21 Feb 2024 11:15  Patient On (Oxygen Delivery Method): room air        General Exam:   General Appearance: Appropriately dressed and in no acute distress       Head: Normocephalic, atraumatic and no dysmorphic features  Ear, Nose, and Throat: Moist mucous membranes  CVS: S1S2+  Resp: No SOB, no wheeze or rhonchi  Abd: soft NTND  Extremities: No edema, no cyanosis  Skin: No bruises, no rashes        Neurological Exam:  Mental Status: Awake, alert and oriented x 3.  Able to follow simple and complex verbal commands. Able to name and repeat. fluent speech. No obvious aphasia or dysarthria noted.   Cranial Nerves: PERRL, EOMI, VFFC, sensation V1-V3 intact,  no obvious facial asymmetry, equal elevation of palate, scm/trap 5/5, tongue is midline on protrusion. hearing is grossly intact.   Motor: Normal bulk, tone and strength throughout. Fine finger movements were intact and symmetric. no tremors or drift noted.    Sensation: Intact to light touch and pinprick throughout. no right/left confusion. no extinction to tactile on DSS. Romberg was negative.   Reflexes: 1+ throughout at biceps, brachioradialis, triceps, patellars and ankles bilaterally and equal. No clonus. R toe and L toe were both downgoing.  Coordination: No dysmetria on FNF or HKS  Gait: ataxic    I personally reviewed the below data/images/labs:      CBC Full  -  ( 21 Feb 2024 04:49 )  WBC Count : 4.80 K/uL  RBC Count : 4.57 M/uL  Hemoglobin : 12.9 g/dL  Hematocrit : 39.9 %  Platelet Count - Automated : 305 K/uL  Mean Cell Volume : 87.3 fL  Mean Cell Hemoglobin : 28.2 pg  Mean Cell Hemoglobin Concentration : 32.3 gm/dL  Auto Neutrophil # : 4.48 K/uL  Auto Lymphocyte # : 0.25 K/uL  Auto Monocyte # : 0.05 K/uL  Auto Eosinophil # : 0.00 K/uL  Auto Basophil # : 0.00 K/uL  Auto Neutrophil % : 93.4 %  Auto Lymphocyte % : 5.2 %  Auto Monocyte % : 1.0 %  Auto Eosinophil % : 0.0 %  Auto Basophil % : 0.0 %    02-21    144  |  109<H>  |  30<H>  ----------------------------<  144<H>  4.2   |  26  |  0.56    Ca    9.3      21 Feb 2024 04:49  Phos  2.6     02-21  Mg     2.20     02-21    TPro  6.1  /  Alb  3.6  /  TBili  0.7  /  DBili  x   /  AST  24  /  ALT  48<H>  /  AlkPhos  146<H>  02-21    LIVER FUNCTIONS - ( 21 Feb 2024 04:49 )  Alb: 3.6 g/dL / Pro: 6.1 g/dL / ALK PHOS: 146 U/L / ALT: 48 U/L / AST: 24 U/L / GGT: x           PT/INR - ( 19 Feb 2024 22:13 )   PT: 12.4 sec;   INR: 1.10 ratio         PTT - ( 19 Feb 2024 22:13 )  PTT:36.4 sec  Urinalysis Basic - ( 21 Feb 2024 04:49 )    Color: x / Appearance: x / SG: x / pH: x  Gluc: 144 mg/dL / Ketone: x  / Bili: x / Urobili: x   Blood: x / Protein: x / Nitrite: x   Leuk Esterase: x / RBC: x / WBC x   Sq Epi: x / Non Sq Epi: x / Bacteria: x      < from: CT Head No Cont (02.17.23 @ 19:51) >    ACC: [id number] EXAM:  CT BRAIN   ORDERED BY: ARNEL MIDDLETON DATE:  02/17/2023          INTERPRETATION:  CLINICAL STATEMENT: Altered mental status.    TECHNIQUE: Noncontrast CT of the brain was performed. Beam hardening   artifact from the petrous pyramid limits evaluation of the brainstem and   posterior fossa. KV and MA adjusted according to patient's body habitus.   Sagittal and coronal reformatted images provided.  COMPARISON: No similar prior studies for comparison.    FINDINGS:    Mild, age-appropriate atrophy and patchy periventricular hypoattenuation,   involving the left greater than right external capsules as well.    No evidence of extra-axial collection, intracranial hemorrhage, mass or   mass effect. Gray-white matter differentiation appears preserved.    Visualized paranasal sinuses and mastoid air cells appear well-aerated.   No aggressive calvarial or skull base lesion.    IMPRESSION:  1. No evidence of acute territorial infarction, hemorrhage or mass effect.  2. Age-appropriate atrophy and patchy periventricular hypoattenuation,   involving the left greater than right external capsules as well; a   nonspecific finding which statistically reflects chronic microvascular   ischemic change.    < end of copied text >    RADIOLOGY:   CTH 2/19/24 from outside hospital: Right cerebellar mass measuring 3.4 x 3.2 cm w/ surrounding hyperdensities c/w metastasis w/ associated vasogenic edema. Minimal mass effect to right lateral aspect of fourth ventricle. No acute hydrocephalus.       IMPRESSION:  heterogeneously enhancing 3.8 x 3.5 x 2.9 cm mass in the   RIGHT cerebellum with mild edema suspicious for metastatic disease rather   than a primary neoplasm. There is mild mass effect on the fourth   ventricle without obstruction. Mild periventricular and scattered   bifrontal biparietal subcortical white matter ischemia is noted.

## 2024-02-21 NOTE — PATIENT PROFILE ADULT - FALL HARM RISK - HARM RISK INTERVENTIONS
Assistance with ambulation/Assistance OOB with selected safe patient handling equipment/Communicate Risk of Fall with Harm to all staff/Discuss with provider need for PT consult/Monitor gait and stability/Reinforce activity limits and safety measures with patient and family/Sit up slowly, dangle for a short time, stand at bedside before walking/Tailored Fall Risk Interventions/Visual Cue: Yellow wristband and red socks/Bed in lowest position, wheels locked, appropriate side rails in place/Call bell, personal items and telephone in reach/Instruct patient to call for assistance before getting out of bed or chair/Non-slip footwear when patient is out of bed/Dwarf to call system/Physically safe environment - no spills, clutter or unnecessary equipment/Purposeful Proactive Rounding/Room/bathroom lighting operational, light cord in reach

## 2024-02-21 NOTE — CONSULT NOTE ADULT - SUBJECTIVE AND OBJECTIVE BOX
HPI:  78-year-old female with  medical history of COPD, HTN, Anxiety, Afib (on Xarelto), CML (she was on Bosulif which was discontinued due to elev LFT), HLD (Rosuvastatin held due to elev LFTs), h/o Lung ca (SCC, large [id number] Rt perihilar mass); Pt  presented to outside hospital ([facility]) complaining of falls, dizziness for 3 weeks.  She states that while walking she feels that she falls to the right and had a fall today into the snow denies any injuries.  At outside hospital CT head showing new right cerebellar mass with surrounding vasogenic edema and mass effect on the fourth ventricle and probable small foci of microbleeds consistent with metastases.  No acute territorial infarct.  Patient was given 10 mg IV Dex and transferred here to receive neurosurgical evaluation.  MRI brain showed a R cerebellar mass        KPS:    Allergies    No Known Allergies    Intolerances        ROS: [  ] Fever  [  ] Chills  [  ]Chest Pain [  ] SOB  [  ]Cough [  ] N/V  [  ] Diarrhea [  ]Constipation [  ]Other ROS:  [  ] ROS otherwise negative    PAST MEDICAL & SURGICAL HISTORY:  CML (chronic myelocytic leukemia)    COPD (chronic obstructive pulmonary disease)    Hypertension    Solitary pulmonary nodule    Lung cancer    Atrial fibrillation    Hypertension    Leukemia    COPD (chronic obstructive pulmonary disease)    History of hysterectomy    Pilonidal cyst        FAMILY HISTORY:  FH: Lucia Gehrig's disease (Father)      MEDICATIONS  (STANDING):  amLODIPine   Tablet 2.5 milliGRAM(s) Oral daily  atenolol  Tablet 50 milliGRAM(s) Oral daily  budesonide  80 MICROgram(s)/formoterol 4.5 MICROgram(s) Inhaler 2 Puff(s) Inhalation two times a day  dexAMETHasone  Injectable 4 milliGRAM(s) IV Push every 6 hours  FLUoxetine 10 milliGRAM(s) Oral daily  pantoprazole    Tablet 40 milliGRAM(s) Oral before breakfast  tiotropium 2.5 MICROgram(s) Inhaler 2 Puff(s) Inhalation daily  valsartan 160 milliGRAM(s) Oral daily    MEDICATIONS  (PRN):  acetaminophen     Tablet .. 650 milliGRAM(s) Oral every 6 hours PRN Temp greater or equal to 38C (100.4F), Mild Pain (1 - 3)  albuterol    90 MICROgram(s) HFA Inhaler 2 Puff(s) Inhalation every 6 hours PRN Wheezing  aluminum hydroxide/magnesium hydroxide/simethicone Suspension 30 milliLiter(s) Oral every 4 hours PRN Dyspepsia  clonazePAM Oral Disintegrating Tablet 0.25 milliGRAM(s) Oral two times a day PRN Anxiety  melatonin 3 milliGRAM(s) Oral at bedtime PRN Insomnia      PHYSICAL EXAM  Vital Signs Last 24 Hrs  T(C): 36.4 (21 Feb 2024 05:15), Max: 37.3 (20 Feb 2024 18:46)  T(F): 97.6 (21 Feb 2024 05:15), Max: 99.1 (20 Feb 2024 18:46)  HR: 70 (21 Feb 2024 05:15) (63 - 78)  BP: 124/76 (21 Feb 2024 05:15) (124/67 - 153/87)  BP(mean): --  RR: 18 (21 Feb 2024 05:15) (17 - 18)  SpO2: 99% (21 Feb 2024 05:15) (95% - 99%)    Parameters below as of 21 Feb 2024 05:15  Patient On (Oxygen Delivery Method): room air        General: Well nourished, well developed, no acute distress  HEENT: NC/AT; EOMI, PERRL, sclera nonicteric; external ears normal; no rhinorrhea or epistaxis; mucous membranes moist; oropharynx clear and without erythema  CV: NR, RR; no appreciable r/m/g  Lungs: CTAB, no increased work of breathing  Abdomen: Bowel sounds present; soft, NTND  MSK: Vertebral spine non-tender to palpation  Neuro: AAOx3; cranial nerves II-XII intact; strength 5/5 in upper and lower extremities; sensation to light touch in tact bilaterally.  Psych: Full affect; mood congruent  Skin: no visible rashes on limited examination    IMAGING/LABS/PATHOLOGY: I have personally reviewed the relevant labs, pathology, and imaging as noted in the HPI.  In addition,    ASSESSMENT/PLAN    KALA VALLEJO is a 78y woman with     We discussed the use of palliative radiation in this setting, namely to improve quality of life through the reduction of symptoms.  We talked about the risks, benefits, acute and long term side effects, as well as expected treatment outcomes.  He/She was given the opportunity to ask questions, which were answered to his/her apparent satisfaction.  He/She provided written consent to proceed with radiation therapy. We will arrange for inpatient/outpatient treatment. HPI:  78-year-old female with  medical history of COPD, HTN, Anxiety, Afib (on Xarelto), CML (she was on Bosulif which was discontinued due to elev LFT), HLD (Rosuvastatin held due to elev LFTs), h/o Lung ca (SCC, large [id number] Rt perihilar mass); Pt  presented to outside hospital ([facility]) complaining of falls, dizziness for 3 weeks.  She states that while walking she feels that she falls to the right and had a fall today into the snow denies any injuries.  At outside hospital CT head showing new right cerebellar mass with surrounding vasogenic edema and mass effect on the fourth ventricle and probable small foci of microbleeds consistent with metastases.  No acute territorial infarct.  Patient was given 10 mg IV Dex and transferred here to receive neurosurgical evaluation.  MRI brain showed a R cerebellar mass. she reports diziness while ambulating. NSG has seen the patient and per patient report, recommended GK.    KPS: 60    Allergies    No Known Allergies    Intolerances      ROS: [  ] Fever  [  ] Chills  [  ]Chest Pain [  ] SOB  [  ]Cough [  ] N/V  [  ] Diarrhea [  ]Constipation [  ]Other ROS:  [  ] ROS otherwise negative    PAST MEDICAL & SURGICAL HISTORY:  CML (chronic myelocytic leukemia)    COPD (chronic obstructive pulmonary disease)    Hypertension    Solitary pulmonary nodule    Lung cancer    Atrial fibrillation    Hypertension    Leukemia    COPD (chronic obstructive pulmonary disease)    History of hysterectomy    Pilonidal cyst        FAMILY HISTORY:  FH: Lucia Gehrig's disease (Father)      MEDICATIONS  (STANDING):  amLODIPine   Tablet 2.5 milliGRAM(s) Oral daily  atenolol  Tablet 50 milliGRAM(s) Oral daily  budesonide  80 MICROgram(s)/formoterol 4.5 MICROgram(s) Inhaler 2 Puff(s) Inhalation two times a day  dexAMETHasone  Injectable 4 milliGRAM(s) IV Push every 6 hours  FLUoxetine 10 milliGRAM(s) Oral daily  pantoprazole    Tablet 40 milliGRAM(s) Oral before breakfast  tiotropium 2.5 MICROgram(s) Inhaler 2 Puff(s) Inhalation daily  valsartan 160 milliGRAM(s) Oral daily    MEDICATIONS  (PRN):  acetaminophen     Tablet .. 650 milliGRAM(s) Oral every 6 hours PRN Temp greater or equal to 38C (100.4F), Mild Pain (1 - 3)  albuterol    90 MICROgram(s) HFA Inhaler 2 Puff(s) Inhalation every 6 hours PRN Wheezing  aluminum hydroxide/magnesium hydroxide/simethicone Suspension 30 milliLiter(s) Oral every 4 hours PRN Dyspepsia  clonazePAM Oral Disintegrating Tablet 0.25 milliGRAM(s) Oral two times a day PRN Anxiety  melatonin 3 milliGRAM(s) Oral at bedtime PRN Insomnia      PHYSICAL EXAM  Vital Signs Last 24 Hrs  T(C): 36.4 (21 Feb 2024 05:15), Max: 37.3 (20 Feb 2024 18:46)  T(F): 97.6 (21 Feb 2024 05:15), Max: 99.1 (20 Feb 2024 18:46)  HR: 70 (21 Feb 2024 05:15) (63 - 78)  BP: 124/76 (21 Feb 2024 05:15) (124/67 - 153/87)  BP(mean): --  RR: 18 (21 Feb 2024 05:15) (17 - 18)  SpO2: 99% (21 Feb 2024 05:15) (95% - 99%)    Parameters below as of 21 Feb 2024 05:15  Patient On (Oxygen Delivery Method): room air        General: Well nourished, well developed, no acute distress  HEENT: NC/AT; EOMI, PERRL, sclera nonicteric; external ears normal; no rhinorrhea or epistaxis; mucous membranes moist; oropharynx clear and without erythema  CV: NR, RR; no appreciable r/m/g  Lungs: CTAB, no increased work of breathing  Abdomen: Bowel sounds present; soft, NTND  MSK: Vertebral spine non-tender to palpation  Neuro: AAOx3; cranial nerves II-XII intact; strength 5/5 in upper and lower extremities; sensation to light touch in tact bilaterally.  Psych: Full affect; mood congruent  Skin: no visible rashes on limited examination    IMAGING/LABS/PATHOLOGY: I have personally reviewed the relevant labs, pathology, and imaging as noted in the HPI.  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 Given single lesion and good performance status, I recommend outpatient SRS, will set up patient for outpatient appointment if she wishes to proceed with radiation at [facility] with Dr Sevilla.

## 2024-02-21 NOTE — DISCHARGE NOTE PROVIDER - NSDCCPTREATMENT_GEN_ALL_CORE_FT
PRINCIPAL PROCEDURE  Procedure: MRI brain w/ contrast  Findings and Treatment: heterogeneously enhancing 3.8 x 3.5 x 2.9 cm mass in the   RIGHT cerebellum with mild edema suspicious for metastatic disease rather   than a primary neoplasm. There is mild mass effect on the fourth   ventricle without obstruction. Mild periventricular and scattered   bifrontal biparietal subcortical white matter ischemia is noted.

## 2024-02-21 NOTE — DISCHARGE NOTE PROVIDER - NSDCCPCAREPLAN_GEN_ALL_CORE_FT
PRINCIPAL DISCHARGE DIAGNOSIS  Diagnosis: Brain mass  Assessment and Plan of Treatment:      PRINCIPAL DISCHARGE DIAGNOSIS  Diagnosis: Brain mass  Assessment and Plan of Treatment: You were found to have a brain mass in the cerebellum (part of the brain) with has caused swelling in the brain. For thw swelling to improved you must take decadron (steroid). Your oncology team is transferring [place] [facility] [place]. Your blood thinner XARELTO is being held at this time. Follow up with your oncology team when to resume.     PRINCIPAL DISCHARGE DIAGNOSIS  Diagnosis: Brain mass  Assessment and Plan of Treatment: You were found to have a brain mass in the cerebellum (part of the brain) with has caused swelling in the brain. For the swelling to improve, you must take decadron (steroid). Your oncology team is transferring you to [facility] for further care. Your blood thinner XARELTO is being held at this time. Follow up with your oncology team when to resume.

## 2024-02-21 NOTE — PATIENT PROFILE ADULT - FUNCTIONAL ASSESSMENT - DAILY ACTIVITY ASSESSMENT TYPE
CHIEF COMPLAINT:  Pain of the Left Shoulder       HISTORY OF PRESENT ILLNESS  Mr. Kirk is a pleasant 45 year old male who presents to clinic today with left shoulder pain.  Chandni has been experiencing pain on the top of his left shoulder for many months.  No single inciting event that he can recall.  This is improving somewhat over time as he has adjusted his lifting routine (he engages in strength training for exercise).    However, his main issue is bilateral arm pain and weakness.  Left worse than right.  This starts in his bicep and travels down his arm to his hand.  This has been present for the past year, started after an episode of squatting in which he was squatting with a heavy barbell.  He feels as if his left arm is weak at times, he does notice distinct weakness with gripping and with lifting weights.  He does not experience numbness or tingling.    Additional history: as documented    MEDICAL HISTORY  Patient Active Problem List   Diagnosis    Health Care Home    Adjustment reaction    Depressive disorder, not elsewhere classified    Intermittent asthma    Obesity    Tinea cruris       Current Outpatient Medications   Medication Sig Dispense Refill    albuterol (PROAIR HFA) 108 (90 BASE) MCG/ACT inhaler Inhale 1-2 puffs into the lungs every 6 hours as needed.      Miconazole Nitrate 2 % ointment Apply topically 2 times daily 56 g 0    trimethoprim-polymyxin b (POLYTRIM) ophthalmic solution Apply 1 drop to eye every 4 hours for 7 days. 1 Bottle 0       Allergies   Allergen Reactions    Seafood Unknown       No family history on file.    Additional medical/Social/Surgical histories reviewed in [facility] and updated as appropriate.        PHYSICAL EXAM  General  - normal appearance, in no obvious distress  Musculoskeletal - left shoulder  - inspection: normal bone and joint alignment, no obvious deformity, no scapular winging, no AC step-off  - palpation: mildly tender AC  - ROM:  painful resisted  flexion  - strength: 5/5  strength, 5/5 in all shoulder planes  - special tests:  (-) Speed's  (-) Neer  (+) Hawkin's  (-) Bandar's  Neuro  - no sensory or motor deficit, grossly normal coordination, normal muscle tone               ASSESSMENT & PLAN  Mr. Kirk is a 45 year old male who presents to clinic today with left shoulder pain.  He also has bilateral arm pain and left arm weakness.    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    His shoulder symptoms do seem to be secondary to an AC joint issue, we discussed the repetitive microtrauma nature of weightlifting and that this has likely contributed to this pain.  I am happy that this is improving.  We could consider injection based treatments or physical therapy in the future, if worsening.    As for his arm weakness this does suggest a cervical radicular component.  Given his relative failure of exercise-based treatment and rest over the past year I ordered and independently reviewed an x-ray of his cervical spine.  This does reveal mild arthritic change.  I am ordering an MR of his cervical spine to further investigate.  I will get in touch with him with the results.    It was a pleasure seeing Chandni today.    Tima Perry DO, [facility]  Primary Care Sports Medicine      This note was constructed using Dragon dictation software, please excuse any minor errors in spelling, grammar, or syntax.     Admission

## 2024-02-21 NOTE — DISCHARGE NOTE PROVIDER - NSDCFUADDAPPT_GEN_ALL_CORE_FT
You can follow up outpatient with [facility] Radiation Oncology Dr. Sevilla.   Follow up with your team at [place]: Dr. Woo Leon of [facility] and Dr. Fátima Cardenas

## 2024-02-21 NOTE — DISCHARGE NOTE PROVIDER - CARE PROVIDER_API CALL
Juan Diego Sevilla  Radiation Oncology  [address] [place] [id number]  Phone: [phone]  Fax: [phone]  Follow Up Time:

## 2024-02-21 NOTE — PATIENT PROFILE ADULT - BILL PAYMENT
Spoke with mom; mom states she was told this prescription could be filled by Dr. Myers as long as they had an appointment scheduled.     Arleth currently does not have a pcp as Dr. Joel left the practice in January 2021.       Mom would like to establish with Dr. Quiñonez at the [facility]; however [facility] has never been seen by Dr. Quiñonez at this time.      Routed encounter back to Dr. Myers's nursing pool.           no

## 2024-02-21 NOTE — CONSULT NOTE ADULT - REASON FOR ADMISSION
Unsteady gait and trip and falls

## 2024-02-21 NOTE — CONSULT NOTE ADULT - ASSESSMENT
PAF  in sinus  a/c on hold due to newly discovered cerebellar mass with falls   cont BB  resume a/c in future once deemed safe  Pt follows with Dr Van     HTN  stable    Cerebellar mass  plan as per Neurology / nsx
This is a 78 year old female with lung cancer and CML who presents with dizziness and frequent falls due to new brain metastases.     1. Lung cancer   -- She was initially diagnosed with stage II squamous cell lung cancer, treated with carbo/taxol + RT followed by durvalumab, completed 09/27/2023  -- Her most recent surveillance CT scans w/o evidence of recurrence or POD. CTH from 12/19/2023 w/o evidence of intracranial abnormalities   -- Under care of [place]. Woo Leon [place] [facility] [place]     2. CML   -- Most recently on bosutinib but held 01/29/2024 due to grade IV transaminitis   -- Cont to hold systemic treatment while admitted   -- Follow up with Dr. Fátima Cardenas of [facility] [place] after [place]     3. Brain metastases   -- Pt presented with dizziness, gait instability, and frequent falls   -- [place] from [facility] [place] showed cerebellar mass w vasogenic edema and mass effect on adjacent ventricle   -- MRI head pending to assess further   -- Neurosurgery consulted, awaiting MRI. Rad/onc consult once MRI done and resulted.   -- Continue with dexamethasone 4mg q6    4. Transaminitis   -- Likely adverse effect from bosutinib which has been held   -- LFTs are significantly improved compared to prior labs done outpatient on 01/25 (Alk phos 250, , )    Will continue to follow.    Ashley Jones PA-C  Hematology/Oncology  [facility]  [phone] (office)  [phone] (alt office)  Evenings and weekends please call MD on call or office  
77yo F PMH HTN, afib on Xarelto, lung cancer and CML presenting w/ dizziness and frequent falls x 1 week with CTH from outside hospital showing new right cerebellar mass with surrounding vasogenic edema.
79yo F PMH HTN, afib on Xarelto, lung cancer and CML presenting w/ dizziness and frequent falls x 1 week with CTH from outside hospital showing new right cerebellar mass with surrounding vasogenic edema.  Grossly non-focal on exam, ataxic gait upon ambulation    R cerebellar mass  - likely mets  Afib  Transaminitis    MRI brain with and without pending  CT CAP   steroids per neurosurgery  AC on hold due to hemorrhagic component noted on CT - can continue to hold until MRI results to further eval for hemorrhagic component   PT/OT  DVT ppx

## 2024-02-21 NOTE — PROGRESS NOTE ADULT - SUBJECTIVE AND OBJECTIVE BOX
Patient is a 78y old  Female who presents with a chief complaint of Unsteady gait and trip and falls (21 Feb 2024 12:04)    Patient seen this afternoon. She feels well and denies headache or blurry vision. No dizziness when laying down or sitting in the bed.  [place] [facility].     MEDICATIONS  (STANDING):  amLODIPine   Tablet 2.5 milliGRAM(s) Oral daily  atenolol  Tablet 50 milliGRAM(s) Oral daily  budesonide  80 MICROgram(s)/formoterol 4.5 MICROgram(s) Inhaler 2 Puff(s) Inhalation two times a day  dexAMETHasone  Injectable 4 milliGRAM(s) IV Push every 6 hours  FLUoxetine 10 milliGRAM(s) Oral daily  pantoprazole    Tablet 40 milliGRAM(s) Oral before breakfast  tiotropium 2.5 MICROgram(s) Inhaler 2 Puff(s) Inhalation daily  valsartan 160 milliGRAM(s) Oral daily    MEDICATIONS  (PRN):  acetaminophen     Tablet .. 650 milliGRAM(s) Oral every 6 hours PRN Temp greater or equal to 38C (100.4F), Mild Pain (1 - 3)  albuterol    90 MICROgram(s) HFA Inhaler 2 Puff(s) Inhalation every 6 hours PRN Wheezing  aluminum hydroxide/magnesium hydroxide/simethicone Suspension 30 milliLiter(s) Oral every 4 hours PRN Dyspepsia  clonazePAM Oral Disintegrating Tablet 0.25 milliGRAM(s) Oral two times a day PRN Anxiety  melatonin 3 milliGRAM(s) Oral at bedtime PRN Insomnia        Vital Signs Last 24 Hrs  T(C): 36.6 (21 Feb 2024 11:15), Max: 37.3 (20 Feb 2024 18:46)  T(F): 97.9 (21 Feb 2024 11:15), Max: 99.1 (20 Feb 2024 18:46)  HR: 63 (21 Feb 2024 11:15) (63 - 78)  BP: 158/64 (21 Feb 2024 11:15) (124/67 - 158/64)  BP(mean): --  RR: 19 (21 Feb 2024 11:15) (17 - 19)  SpO2: 97% (21 Feb 2024 11:15) (95% - 99%)    Parameters below as of 21 Feb 2024 11:15  Patient On (Oxygen Delivery Method): room air        PE  NAD  Awake, alert  Anicteric, MMM  No c/c/e  No rash grossly  FROM                          12.9   4.80  )-----------( 305      ( 21 Feb 2024 04:49 )             39.9       02-21    144  |  109<H>  |  30<H>  ----------------------------<  144<H>  4.2   |  26  |  0.56    Ca    9.3      21 Feb 2024 04:49  Phos  2.6     02-21  Mg     2.20     02-21    TPro  6.1  /  Alb  3.6  /  TBili  0.7  /  DBili  x   /  AST  24  /  ALT  48<H>  /  AlkPhos  146<H>  02-21          ACC: [id number] EXAM:  MR BRAIN WAW IC FOR SRS   ORDERED BY: DONNA MURILLO     PROCEDURE DATE:  02/20/2024          INTERPRETATION:  MR brain with and without gadolinium    CLINICAL INFORMATION:   Follow-up cerebellar lesion, history of lung   cancer and CML    TECHNIQUE:   Sagittal and axial T1-weighted images, axial FLAIR images,   axial T2-weighted images, axial gradient echo images and axial diffusion   weighted images of the brain were obtained. Following 7 cc of Gadavist   administration, .5 cc discarded, isotropic volumetric and fast spin echo   T1-weighted images were obtained; this data was reformatted using image   post processing software in multiple imaging planes.    FINDINGS:   No prior similar studies are available for review.    The brain demonstrates  a heterogeneously enhancing 3.8 x 3.5 x 2.9 cm   mass in the RIGHT cerebellum with mild edema suspicious for metastatic   disease rather than a primary neoplasm. There is mild mass effect on the   fourth ventricle without obstruction. Mild periventricular and scattered   bifrontal biparietal subcortical white matter ischemia is noted. No acute   cerebral cortical infarct is found.   No intracranial hemorrhage is   recognized.  No mass effect is found in the brain.    The ventricles, sulci and basal cisterns appear unremarkable.    The vertebral and internal carotid arteries demonstrate expected flow   voids indicating their patency.    The orbits are unremarkable.  The paranasal sinuses are clear.  The nasal   cavity appears intact.  The nasopharynx is symmetric.  The central skull   base and petrous temporal bones are intact.  The calvarium appears   unremarkable.      IMPRESSION:  heterogeneously enhancing 3.8 x 3.5 x 2.9 cm mass in the   RIGHT cerebellum with mild edema suspicious for metastatic disease rather   than a primary neoplasm. There is mild mass effect on the fourth   ventricle without obstruction. Mild periventricular and scattered   bifrontal biparietal subcortical white matter ischemia is noted.    --- End of Report ---

## 2024-02-21 NOTE — DISCHARGE NOTE PROVIDER - NSDCCONDITION_GEN_ALL_CORE
History of present illness:              Kimberly Chakraborty is a 77 y.o. patient who is seen today for follow up for coronary artery disease, hypertension and hyperlipidemia. Today he reports he feels well over all from a cardiac standpoint. He does states he is slowing down somewhat. He has days where he feels more fatigued than normal. He continues to be bothered by knee pain. He is unable to walk 1 mile without pain or shortness of breath. His spouse, who is present at today's visit, states he becomes very short of breath with even minimal activity. Stress test 8/18/17 showed no ischemia. His spouse signed him up for exercise and nutrition counseling at the [facility] beginning in August. 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 He denies chest pain, dizziness, palpitations, or syncope. Assessment:  77 y.o. male with:  1. Coronary artery disease              ~Increased shortness of breath anginal equivalent - unstable angina              ~Plan for [facility] to assess - Last angiogram 8/28/2006 Nadya Bangura MD) and s/p bypass   2. Hyperlipidemia              ~lipitor              ~optimal  3. Hypertension              ~BP: 122/74   4. Leg pains              ~appear from non-cardiac issues: ongoing   5. Obesity              ~BMI 38.08 7/23/18     Plan:  1. The patient has been given instructions on addressing diet, regular exercise, weight control, smoking abstention, medication compliance, and stress minimization. Encourage a minimum of 150 minutes of exercise weekly. Water exercises would be beneficial to you without injuring your knees. He has been historically non-complaint and not committed to his overall care. 2. Left Heart Cath is recommended  3. Follow up with me after procedure  4.  Referral to Dr. Venetia Schilder for weight loss Stable

## 2024-02-21 NOTE — CONSULT NOTE ADULT - SUBJECTIVE AND OBJECTIVE BOX
CHIEF COMPLAINT:Patient is a 78y old  Female who presents with a chief complaint of Unsteady gait and trip and falls (20 Feb 2024 17:49)      HISTORY OF PRESENT ILLNESS:    78 F wiht COPD, HTN, PAF on a/c CML , HLD lung ca presents with falls  and unsteady gait found to have cerebellar mass   No chest pain, dyspnea, palpitation, or dizziness.     PAST MEDICAL & SURGICAL HISTORY:  CML (chronic myelocytic leukemia)      COPD (chronic obstructive pulmonary disease)      Hypertension      Solitary pulmonary nodule      Lung cancer      Atrial fibrillation      Hypertension      Leukemia      COPD (chronic obstructive pulmonary disease)      History of hysterectomy      Pilonidal cyst              MEDICATIONS:  amLODIPine   Tablet 2.5 milliGRAM(s) Oral daily  atenolol  Tablet 50 milliGRAM(s) Oral daily  valsartan 160 milliGRAM(s) Oral daily      albuterol    90 MICROgram(s) HFA Inhaler 2 Puff(s) Inhalation every 6 hours PRN  budesonide  80 MICROgram(s)/formoterol 4.5 MICROgram(s) Inhaler 2 Puff(s) Inhalation two times a day  tiotropium 2.5 MICROgram(s) Inhaler 2 Puff(s) Inhalation daily    acetaminophen     Tablet .. 650 milliGRAM(s) Oral every 6 hours PRN  clonazePAM Oral Disintegrating Tablet 0.25 milliGRAM(s) Oral two times a day PRN  FLUoxetine 10 milliGRAM(s) Oral daily  melatonin 3 milliGRAM(s) Oral at bedtime PRN    aluminum hydroxide/magnesium hydroxide/simethicone Suspension 30 milliLiter(s) Oral every 4 hours PRN  pantoprazole    Tablet 40 milliGRAM(s) Oral before breakfast    dexAMETHasone  Injectable 4 milliGRAM(s) IV Push every 6 hours        FAMILY HISTORY:  FH: Lucia Gehrig's disease (Father)        Non-contributory    SOCIAL HISTORY:    No tobacco, drugs or etoh    Allergies    No Known Allergies    Intolerances    	    REVIEW OF SYSTEMS:  as above  The rest of the 14 points ROS reviewed and except above they are unremarkable.        PHYSICAL EXAM:  T(C): 36.4 (02-21-24 @ 05:15), Max: 37.3 (02-20-24 @ 18:46)  HR: 70 (02-21-24 @ 05:15) (63 - 78)  BP: 124/76 (02-21-24 @ 05:15) (120/95 - 153/87)  RR: 18 (02-21-24 @ 05:15) (17 - 18)  SpO2: 99% (02-21-24 @ 05:15) (95% - 100%)  Wt(kg): --  I&O's Summary    JVP: Normal  Neck: supple  Lung: clear   CV: S1 S2 , Murmur:  Abd: soft  Ext: No edema  neuro: Awake / alert  Psych: flat affect  Skin: normal      LABS/DATA:    TELEMETRY: 	    ECG:  	sinus    	  CARDIAC MARKERS:          < from: TTE Echo Complete w/o Contrast w/ Doppler (02.20.23 @ 13:56) >    Summary:   1. Left ventricular ejection fraction, by visual estimation, is 60 to   65%.   2. Normal global left ventricular systolic function.   3. Mildly enlarged left atrium.   4. Mild mitral annular calcification.   5. Mild mitral valve regurgitation.   6. Thickening of the anterior and posterior mitral valve leaflets.   7. Mild tricuspid regurgitation.   8. Estimated pulmonary artery systolic pressure is 35.2 mmHg assuming a   right atrial pressure of 5 mmHg, which is consistent with mild pulmonary   hypertension.      [id number] Juan Manuel Haji MD, FACC  Electronically signed on 2/20/2023 at10:42:15 PM    < end of copied text >                              12.9   4.80  )-----------( 305      ( 21 Feb 2024 04:49 )             39.9     02-21    144  |  109<H>  |  30<H>  ----------------------------<  144<H>  4.2   |  26  |  0.56    Ca    9.3      21 Feb 2024 04:49  Phos  2.6     02-21  Mg     2.20     02-21    TPro  6.1  /  Alb  3.6  /  TBili  0.7  /  DBili  x   /  AST  24  /  ALT  48<H>  /  AlkPhos  146<H>  02-21    proBNP:   Lipid Profile:   HgA1c:   TSH:

## 2024-02-21 NOTE — DISCHARGE NOTE PROVIDER - HOSPITAL COURSE
78-year-old female with  medical history of COPD, HTN, Anxiety, Afib (on Xarelto), CML (he was on Bosulif which was discontinued due to elev LFT), HLD (Rosuvastatin held due to elev LFTs), h/o Lung ca (SCC, large [id number] Rt perihilar mass) a/w unsteady gait and newly found RT cerebellar mass with associated vasogenic edema. MR Head with "heterogeneously enhancing 3.8 x 3.5 x 2.9 cm mass in the RIGHT cerebellum with mild edema suspicious for metastatic disease rather than a primary neoplasm. There is mild mass effect on the fourth ventricle without obstruction. Mild periventricular and scattered bifrontal biparietal subcortical white matter ischemia is noted." On IV decadron. Case discussed with hem/onc ___________    78-year-old female with  medical history of COPD, HTN, Anxiety, Afib (on Xarelto), CML (he was on Bosulif which was discontinued due to elev LFT), HLD (Rosuvastatin held due to elev LFTs), h/o Lung ca (SCC, large [id number] Rt perihilar mass) a/w unsteady gait and newly found RT cerebellar mass with associated vasogenic edema. MR Head with "heterogeneously enhancing 3.8 x 3.5 x 2.9 cm mass in the RIGHT cerebellum with mild edema suspicious for metastatic disease rather than a primary neoplasm. There is mild mass effect on the fourth ventricle without obstruction. Mild periventricular and scattered bifrontal biparietal subcortical white matter ischemia is noted." On IV decadron. Case discussed with hem/onc who has initiated transfer to [facility] [place] for further care.     Case discussed with Dr. Zheng on __________   78-year-old female with  medical history of COPD, HTN, Anxiety, Afib (on Xarelto), CML (he was on Bosulif which was discontinued due to elev LFT), HLD (Rosuvastatin held due to elev LFTs), h/o Lung ca (SCC, large [id number] Rt perihilar mass) a/w unsteady gait and newly found RT cerebellar mass with associated vasogenic edema. MR Head with "heterogeneously enhancing 3.8 x 3.5 x 2.9 cm mass in the RIGHT cerebellum with mild edema suspicious for metastatic disease rather than a primary neoplasm. There is mild mass effect on the fourth ventricle without obstruction. Mild periventricular and scattered bifrontal biparietal subcortical white matter ischemia is noted." On IV decadron. Case discussed with hem/onc who has [place] [facility] [place].     Case discussed with Dr. Zheng on 2/24/2025. [place] medically [place] [facility] [place]    78-year-old female with  medical history of COPD, HTN, Anxiety, Afib (on Xarelto), CML (he was on Bosulif which was discontinued due to elev LFT), HLD (Rosuvastatin held due to elev LFTs), h/o Lung ca (SCC, large [id number] Rt perihilar mass) a/w unsteady gait and newly found RT cerebellar mass with associated vasogenic edema. MR Head with "heterogeneously enhancing 3.8 x 3.5 x 2.9 cm mass in the RIGHT cerebellum with mild edema suspicious for metastatic disease rather than a primary neoplasm. There is mild mass effect on the fourth ventricle without obstruction. Mild periventricular and scattered bifrontal biparietal subcortical white matter ischemia is noted." On IV decadron. Case discussed with hem/onc who [place] [facility] [place].     Case discussed with Dr. Zheng on 2/24/2025. [place] [facility] [place].

## 2024-02-22 LAB
ALBUMIN SERPL ELPH-MCNC: 3.6 G/DL — SIGNIFICANT CHANGE UP (ref 3.3–5)
ALP SERPL-CCNC: 150 U/L — HIGH (ref 40–120)
ALT FLD-CCNC: 40 U/L — HIGH (ref 4–33)
ANION GAP SERPL CALC-SCNC: 11 MMOL/L — SIGNIFICANT CHANGE UP (ref 7–14)
AST SERPL-CCNC: 18 U/L — SIGNIFICANT CHANGE UP (ref 4–32)
BILIRUB DIRECT SERPL-MCNC: 0.3 MG/DL — SIGNIFICANT CHANGE UP (ref 0–0.3)
BILIRUB INDIRECT FLD-MCNC: 0.3 MG/DL — SIGNIFICANT CHANGE UP (ref 0–1)
BILIRUB SERPL-MCNC: 0.6 MG/DL — SIGNIFICANT CHANGE UP (ref 0.2–1.2)
BUN SERPL-MCNC: 28 MG/DL — HIGH (ref 7–23)
CALCIUM SERPL-MCNC: 9.5 MG/DL — SIGNIFICANT CHANGE UP (ref 8.4–10.5)
CHLORIDE SERPL-SCNC: 104 MMOL/L — SIGNIFICANT CHANGE UP (ref 98–107)
CO2 SERPL-SCNC: 27 MMOL/L — SIGNIFICANT CHANGE UP (ref 22–31)
CREAT SERPL-MCNC: 0.49 MG/DL — LOW (ref 0.5–1.3)
EGFR: 97 ML/MIN/1.73M2 — SIGNIFICANT CHANGE UP
GLUCOSE SERPL-MCNC: 134 MG/DL — HIGH (ref 70–99)
HCT VFR BLD CALC: 41.7 % — SIGNIFICANT CHANGE UP (ref 34.5–45)
HGB BLD-MCNC: 13.5 G/DL — SIGNIFICANT CHANGE UP (ref 11.5–15.5)
MAGNESIUM SERPL-MCNC: 2.1 MG/DL — SIGNIFICANT CHANGE UP (ref 1.6–2.6)
MCHC RBC-ENTMCNC: 28.5 PG — SIGNIFICANT CHANGE UP (ref 27–34)
MCHC RBC-ENTMCNC: 32.4 GM/DL — SIGNIFICANT CHANGE UP (ref 32–36)
MCV RBC AUTO: 88 FL — SIGNIFICANT CHANGE UP (ref 80–100)
NRBC # BLD: 0 /100 WBCS — SIGNIFICANT CHANGE UP (ref 0–0)
NRBC # FLD: 0 K/UL — SIGNIFICANT CHANGE UP (ref 0–0)
PHOSPHATE SERPL-MCNC: 2.9 MG/DL — SIGNIFICANT CHANGE UP (ref 2.5–4.5)
PLATELET # BLD AUTO: 323 K/UL — SIGNIFICANT CHANGE UP (ref 150–400)
POTASSIUM SERPL-MCNC: 3.8 MMOL/L — SIGNIFICANT CHANGE UP (ref 3.5–5.3)
POTASSIUM SERPL-SCNC: 3.8 MMOL/L — SIGNIFICANT CHANGE UP (ref 3.5–5.3)
PROT SERPL-MCNC: 5.7 G/DL — LOW (ref 6–8.3)
RBC # BLD: 4.74 M/UL — SIGNIFICANT CHANGE UP (ref 3.8–5.2)
RBC # FLD: 14.5 % — SIGNIFICANT CHANGE UP (ref 10.3–14.5)
SODIUM SERPL-SCNC: 142 MMOL/L — SIGNIFICANT CHANGE UP (ref 135–145)
WBC # BLD: 5.83 K/UL — SIGNIFICANT CHANGE UP (ref 3.8–10.5)
WBC # FLD AUTO: 5.83 K/UL — SIGNIFICANT CHANGE UP (ref 3.8–10.5)

## 2024-02-22 RX ORDER — HYDRALAZINE HCL 50 MG
2.5 TABLET ORAL ONCE
Refills: 0 | Status: COMPLETED | OUTPATIENT
Start: 2024-02-22 | End: 2024-02-22

## 2024-02-22 RX ADMIN — PANTOPRAZOLE SODIUM 40 MILLIGRAM(S): 20 TABLET, DELAYED RELEASE ORAL at 05:55

## 2024-02-22 RX ADMIN — Medication 10 MILLIGRAM(S): at 11:15

## 2024-02-22 RX ADMIN — Medication 2.5 MILLIGRAM(S): at 23:25

## 2024-02-22 RX ADMIN — BUDESONIDE AND FORMOTEROL FUMARATE DIHYDRATE 2 PUFF(S): 160; 4.5 AEROSOL RESPIRATORY (INHALATION) at 10:04

## 2024-02-22 RX ADMIN — AMLODIPINE BESYLATE 2.5 MILLIGRAM(S): 2.5 TABLET ORAL at 05:55

## 2024-02-22 RX ADMIN — Medication 4 MILLIGRAM(S): at 00:02

## 2024-02-22 RX ADMIN — VALSARTAN 160 MILLIGRAM(S): 80 TABLET ORAL at 05:56

## 2024-02-22 RX ADMIN — BUDESONIDE AND FORMOTEROL FUMARATE DIHYDRATE 2 PUFF(S): 160; 4.5 AEROSOL RESPIRATORY (INHALATION) at 21:13

## 2024-02-22 RX ADMIN — TIOTROPIUM BROMIDE 2 PUFF(S): 18 CAPSULE ORAL; RESPIRATORY (INHALATION) at 10:04

## 2024-02-22 RX ADMIN — ATENOLOL 50 MILLIGRAM(S): 25 TABLET ORAL at 05:56

## 2024-02-22 RX ADMIN — Medication 4 MILLIGRAM(S): at 18:26

## 2024-02-22 RX ADMIN — Medication 4 MILLIGRAM(S): at 05:56

## 2024-02-22 RX ADMIN — Medication 4 MILLIGRAM(S): at 13:15

## 2024-02-22 NOTE — PROGRESS NOTE ADULT - SUBJECTIVE AND OBJECTIVE BOX
Patient is a 78y old  Female who presents with a chief complaint of Unsteady gait and trip and falls (21 Feb 2024 16:53)    Pt seen this afternoon. She feels well and denies new complaints. States she ambulated with PT earlier, still a little unsteady but no other issues. [place] [facility].     MEDICATIONS  (STANDING):  amLODIPine   Tablet 2.5 milliGRAM(s) Oral daily  atenolol  Tablet 50 milliGRAM(s) Oral daily  budesonide  80 MICROgram(s)/formoterol 4.5 MICROgram(s) Inhaler 2 Puff(s) Inhalation two times a day  dexAMETHasone  Injectable 4 milliGRAM(s) IV Push every 6 hours  FLUoxetine 10 milliGRAM(s) Oral daily  pantoprazole    Tablet 40 milliGRAM(s) Oral before breakfast  tiotropium 2.5 MICROgram(s) Inhaler 2 Puff(s) Inhalation daily  valsartan 160 milliGRAM(s) Oral daily    MEDICATIONS  (PRN):  acetaminophen     Tablet .. 650 milliGRAM(s) Oral every 6 hours PRN Temp greater or equal to 38C (100.4F), Mild Pain (1 - 3)  albuterol    90 MICROgram(s) HFA Inhaler 2 Puff(s) Inhalation every 6 hours PRN Wheezing  aluminum hydroxide/magnesium hydroxide/simethicone Suspension 30 milliLiter(s) Oral every 4 hours PRN Dyspepsia  clonazePAM Oral Disintegrating Tablet 0.25 milliGRAM(s) Oral two times a day PRN Anxiety  melatonin 3 milliGRAM(s) Oral at bedtime PRN Insomnia        Vital Signs Last 24 Hrs  T(C): 36.7 (22 Feb 2024 10:15), Max: 36.7 (22 Feb 2024 10:15)  T(F): 98.1 (22 Feb 2024 10:15), Max: 98.1 (22 Feb 2024 10:15)  HR: 62 (22 Feb 2024 10:15) (61 - 73)  BP: 168/77 (22 Feb 2024 10:15) (149/64 - 168/77)  BP(mean): --  RR: 17 (22 Feb 2024 10:15) (17 - 18)  SpO2: 95% (22 Feb 2024 10:15) (95% - 97%)    Parameters below as of 22 Feb 2024 10:15  Patient On (Oxygen Delivery Method): room air        PE  NAD  Awake, alert  Anicteric, MMM  No c/c/e  No rash grossly  FROM                          13.5   5.83  )-----------( 323      ( 22 Feb 2024 04:20 )             41.7       02-22    142  |  104  |  28<H>  ----------------------------<  134<H>  3.8   |  27  |  0.49<L>    Ca    9.5      22 Feb 2024 04:20  Phos  2.9     02-22  Mg     2.10     02-22    TPro  5.7<L>  /  Alb  3.6  /  TBili  0.6  /  DBili  0.3  /  AST  18  /  ALT  40<H>  /  AlkPhos  150<H>  02-22

## 2024-02-22 NOTE — PROGRESS NOTE ADULT - SUBJECTIVE AND OBJECTIVE BOX
Name of Patient : KALA VALLEJO  MRN: [id number]  Date of visit: 02-22-24       Subjective: Patient seen and examined. No new events except as noted.   doing okay    REVIEW OF SYSTEMS:    CONSTITUTIONAL: No weakness, fevers or chills  EYES/ENT: No visual changes;  No vertigo or throat pain   NECK: No pain or stiffness  RESPIRATORY: No cough, wheezing, hemoptysis; No shortness of breath  CARDIOVASCULAR: No chest pain or palpitations  GASTROINTESTINAL: No abdominal or epigastric pain. No nausea, vomiting, or hematemesis; No diarrhea or constipation. No melena or hematochezia.  GENITOURINARY: No dysuria, frequency or hematuria  NEUROLOGICAL: No numbness or weakness  SKIN: No itching, burning, rashes, or lesions   All other review of systems is negative unless indicated above.    MEDICATIONS:  MEDICATIONS  (STANDING):  amLODIPine   Tablet 2.5 milliGRAM(s) Oral daily  atenolol  Tablet 50 milliGRAM(s) Oral daily  budesonide  80 MICROgram(s)/formoterol 4.5 MICROgram(s) Inhaler 2 Puff(s) Inhalation two times a day  dexAMETHasone  Injectable 4 milliGRAM(s) IV Push every 6 hours  FLUoxetine 10 milliGRAM(s) Oral daily  pantoprazole    Tablet 40 milliGRAM(s) Oral before breakfast  tiotropium 2.5 MICROgram(s) Inhaler 2 Puff(s) Inhalation daily  valsartan 160 milliGRAM(s) Oral daily      PHYSICAL EXAM:  T(C): 36.7 (02-22-24 @ 16:15), Max: 36.7 (02-22-24 @ 10:15)  HR: 66 (02-22-24 @ 16:15) (61 - 73)  BP: 172/69 (02-22-24 @ 16:15) (149/64 - 172/69)  RR: 18 (02-22-24 @ 16:15) (17 - 18)  SpO2: 96% (02-22-24 @ 16:15) (95% - 97%)  Wt(kg): --  I&O's Summary    21 Feb 2024 07:01  -  22 Feb 2024 07:00  --------------------------------------------------------  IN: 360 mL / OUT: 0 mL / NET: 360 mL    22 Feb 2024 07:01  -  22 Feb 2024 21:44  --------------------------------------------------------  IN: 360 mL / OUT: 0 mL / NET: 360 mL          Appearance: Normal	  HEENT:  PERRLA   Lymphatic: No lymphadenopathy   Cardiovascular: Normal S1 S2, no JVD  Respiratory: normal effort , clear  Gastrointestinal:  Soft, Non-tender  Skin: No rashes,  warm to touch  Psychiatry:  Mood & affect appropriate  Musculuskeletal: No edema    recent labs, Imaging and EKGs personally reviewed     02-21-24 @ 07:01  -  02-22-24 @ 07:00  --------------------------------------------------------  IN: 360 mL / OUT: 0 mL / NET: 360 mL    02-22-24 @ 07:01  -  02-22-24 @ 21:44  --------------------------------------------------------  IN: 360 mL / OUT: 0 mL / NET: 360 mL                          13.5   5.83  )-----------( 323      ( 22 Feb 2024 04:20 )             41.7               02-22    142  |  104  |  28<H>  ----------------------------<  134<H>  3.8   |  27  |  0.49<L>    Ca    9.5      22 Feb 2024 04:20  Phos  2.9     02-22  Mg     2.10     02-22    TPro  5.7<L>  /  Alb  3.6  /  TBili  0.6  /  DBili  0.3  /  AST  18  /  ALT  40<H>  /  AlkPhos  150<H>  02-22                       Urinalysis Basic - ( 22 Feb 2024 04:20 )    Color: x / Appearance: x / SG: x / pH: x  Gluc: 134 mg/dL / Ketone: x  / Bili: x / Urobili: x   Blood: x / Protein: x / Nitrite: x   Leuk Esterase: x / RBC: x / WBC x   Sq Epi: x / Non Sq Epi: x / Bacteria: x

## 2024-02-23 LAB
ANION GAP SERPL CALC-SCNC: 9 MMOL/L — SIGNIFICANT CHANGE UP (ref 7–14)
BUN SERPL-MCNC: 22 MG/DL — SIGNIFICANT CHANGE UP (ref 7–23)
CALCIUM SERPL-MCNC: 9.3 MG/DL — SIGNIFICANT CHANGE UP (ref 8.4–10.5)
CHLORIDE SERPL-SCNC: 101 MMOL/L — SIGNIFICANT CHANGE UP (ref 98–107)
CO2 SERPL-SCNC: 30 MMOL/L — SIGNIFICANT CHANGE UP (ref 22–31)
CREAT SERPL-MCNC: 0.53 MG/DL — SIGNIFICANT CHANGE UP (ref 0.5–1.3)
EGFR: 95 ML/MIN/1.73M2 — SIGNIFICANT CHANGE UP
GLUCOSE SERPL-MCNC: 133 MG/DL — HIGH (ref 70–99)
HCT VFR BLD CALC: 43.2 % — SIGNIFICANT CHANGE UP (ref 34.5–45)
HGB BLD-MCNC: 14.2 G/DL — SIGNIFICANT CHANGE UP (ref 11.5–15.5)
MAGNESIUM SERPL-MCNC: 2.2 MG/DL — SIGNIFICANT CHANGE UP (ref 1.6–2.6)
MCHC RBC-ENTMCNC: 28.5 PG — SIGNIFICANT CHANGE UP (ref 27–34)
MCHC RBC-ENTMCNC: 32.9 GM/DL — SIGNIFICANT CHANGE UP (ref 32–36)
MCV RBC AUTO: 86.7 FL — SIGNIFICANT CHANGE UP (ref 80–100)
NRBC # BLD: 0 /100 WBCS — SIGNIFICANT CHANGE UP (ref 0–0)
NRBC # FLD: 0 K/UL — SIGNIFICANT CHANGE UP (ref 0–0)
PHOSPHATE SERPL-MCNC: 3.2 MG/DL — SIGNIFICANT CHANGE UP (ref 2.5–4.5)
PLATELET # BLD AUTO: 326 K/UL — SIGNIFICANT CHANGE UP (ref 150–400)
POTASSIUM SERPL-MCNC: 4.1 MMOL/L — SIGNIFICANT CHANGE UP (ref 3.5–5.3)
POTASSIUM SERPL-SCNC: 4.1 MMOL/L — SIGNIFICANT CHANGE UP (ref 3.5–5.3)
RBC # BLD: 4.98 M/UL — SIGNIFICANT CHANGE UP (ref 3.8–5.2)
RBC # FLD: 14.1 % — SIGNIFICANT CHANGE UP (ref 10.3–14.5)
SODIUM SERPL-SCNC: 140 MMOL/L — SIGNIFICANT CHANGE UP (ref 135–145)
WBC # BLD: 3.84 K/UL — SIGNIFICANT CHANGE UP (ref 3.8–10.5)
WBC # FLD AUTO: 3.84 K/UL — SIGNIFICANT CHANGE UP (ref 3.8–10.5)

## 2024-02-23 RX ADMIN — BUDESONIDE AND FORMOTEROL FUMARATE DIHYDRATE 2 PUFF(S): 160; 4.5 AEROSOL RESPIRATORY (INHALATION) at 20:13

## 2024-02-23 RX ADMIN — Medication 4 MILLIGRAM(S): at 06:08

## 2024-02-23 RX ADMIN — Medication 4 MILLIGRAM(S): at 13:18

## 2024-02-23 RX ADMIN — Medication 10 MILLIGRAM(S): at 13:18

## 2024-02-23 RX ADMIN — BUDESONIDE AND FORMOTEROL FUMARATE DIHYDRATE 2 PUFF(S): 160; 4.5 AEROSOL RESPIRATORY (INHALATION) at 10:22

## 2024-02-23 RX ADMIN — ATENOLOL 50 MILLIGRAM(S): 25 TABLET ORAL at 06:07

## 2024-02-23 RX ADMIN — VALSARTAN 160 MILLIGRAM(S): 80 TABLET ORAL at 06:08

## 2024-02-23 RX ADMIN — TIOTROPIUM BROMIDE 2 PUFF(S): 18 CAPSULE ORAL; RESPIRATORY (INHALATION) at 10:23

## 2024-02-23 RX ADMIN — Medication 4 MILLIGRAM(S): at 18:05

## 2024-02-23 RX ADMIN — PANTOPRAZOLE SODIUM 40 MILLIGRAM(S): 20 TABLET, DELAYED RELEASE ORAL at 06:07

## 2024-02-23 RX ADMIN — Medication 4 MILLIGRAM(S): at 00:28

## 2024-02-23 RX ADMIN — AMLODIPINE BESYLATE 2.5 MILLIGRAM(S): 2.5 TABLET ORAL at 06:07

## 2024-02-23 NOTE — PROGRESS NOTE ADULT - SUBJECTIVE AND OBJECTIVE BOX
Subjective: Patient seen and examined. No new events except as noted.     SUBJECTIVE/ROS:        MEDICATIONS:  MEDICATIONS  (STANDING):  amLODIPine   Tablet 2.5 milliGRAM(s) Oral daily  atenolol  Tablet 50 milliGRAM(s) Oral daily  budesonide  80 MICROgram(s)/formoterol 4.5 MICROgram(s) Inhaler 2 Puff(s) Inhalation two times a day  dexAMETHasone  Injectable 4 milliGRAM(s) IV Push every 6 hours  FLUoxetine 10 milliGRAM(s) Oral daily  pantoprazole    Tablet 40 milliGRAM(s) Oral before breakfast  tiotropium 2.5 MICROgram(s) Inhaler 2 Puff(s) Inhalation daily  valsartan 160 milliGRAM(s) Oral daily      PHYSICAL EXAM:  T(C): 36.4 (02-22-24 @ 23:55), Max: 36.7 (02-22-24 @ 10:15)  HR: 57 (02-22-24 @ 23:55) (53 - 95)  BP: 136/79 (02-22-24 @ 23:55) (136/79 - 172/90)  RR: 17 (02-22-24 @ 23:55) (17 - 18)  SpO2: 100% (02-22-24 @ 23:55) (95% - 100%)  Wt(kg): --  I&O's Summary    21 Feb 2024 07:01  -  22 Feb 2024 07:00  --------------------------------------------------------  IN: 360 mL / OUT: 0 mL / NET: 360 mL    22 Feb 2024 07:01  -  23 Feb 2024 06:47  --------------------------------------------------------  IN: 360 mL / OUT: 0 mL / NET: 360 mL            JVP: Normal  Neck: supple  Lung: clear   CV: S1 S2 , Murmur:  Abd: soft  Ext: No edema  neuro: Awake / alert  Psych: flat affect  Skin: normal``    LABS/DATA:    CARDIAC MARKERS:                                14.2   3.84  )-----------( 326      ( 23 Feb 2024 05:15 )             43.2     02-23    140  |  101  |  22  ----------------------------<  133<H>  4.1   |  30  |  0.53    Ca    9.3      23 Feb 2024 05:15  Phos  3.2     02-23  Mg     2.20     02-23    TPro  5.7<L>  /  Alb  3.6  /  TBili  0.6  /  DBili  0.3  /  AST  18  /  ALT  40<H>  /  AlkPhos  150<H>  02-22    proBNP:   Lipid Profile:   HgA1c:   TSH:     TELE:  EKG:

## 2024-02-23 NOTE — PROGRESS NOTE ADULT - NS ATTEND AMEND GEN_ALL_CORE FT
appreciate rad onc and neursurg reccs.  plan for transfer to [facility] [place]
78 year old female with lung cancer and CML who presents with dizziness and frequent falls due to new brain metastases. s/p treatment for stage II squamous NSCLC completed chemoRT followed by durvalumab 9/27/23 with most recent scans and CTH without disease. Now with new brain mets- right cerebellum with mild mass effect on the 4th ventricle without obstruction. Dexamethasone, outpatient rad onc and [place] [facility] [place].   CML on bosutinib- on hold for elevated LFTs
pt feeling well and in good spirits  [place] [facility] [place] for brain mets

## 2024-02-23 NOTE — PROGRESS NOTE ADULT - SUBJECTIVE AND OBJECTIVE BOX
Patient is a 78y old  Female who presents with a chief complaint of Unsteady gait and trip and falls (23 Feb 2024 06:47)    Patient seen this morning. She feels well and denies new complaints. Waiting for bed availability for transfer to [facility] [place] neurosurgical management of brain mets.     MEDICATIONS  (STANDING):  amLODIPine   Tablet 2.5 milliGRAM(s) Oral daily  atenolol  Tablet 50 milliGRAM(s) Oral daily  budesonide  80 MICROgram(s)/formoterol 4.5 MICROgram(s) Inhaler 2 Puff(s) Inhalation two times a day  dexAMETHasone  Injectable 4 milliGRAM(s) IV Push every 6 hours  FLUoxetine 10 milliGRAM(s) Oral daily  pantoprazole    Tablet 40 milliGRAM(s) Oral before breakfast  tiotropium 2.5 MICROgram(s) Inhaler 2 Puff(s) Inhalation daily  valsartan 160 milliGRAM(s) Oral daily    MEDICATIONS  (PRN):  acetaminophen     Tablet .. 650 milliGRAM(s) Oral every 6 hours PRN Temp greater or equal to 38C (100.4F), Mild Pain (1 - 3)  albuterol    90 MICROgram(s) HFA Inhaler 2 Puff(s) Inhalation every 6 hours PRN Wheezing  aluminum hydroxide/magnesium hydroxide/simethicone Suspension 30 milliLiter(s) Oral every 4 hours PRN Dyspepsia  clonazePAM Oral Disintegrating Tablet 0.25 milliGRAM(s) Oral two times a day PRN Anxiety  melatonin 3 milliGRAM(s) Oral at bedtime PRN Insomnia        Vital Signs Last 24 Hrs  T(C): 36.6 (23 Feb 2024 06:00), Max: 36.7 (22 Feb 2024 16:15)  T(F): 97.8 (23 Feb 2024 06:00), Max: 98.1 (22 Feb 2024 16:15)  HR: 61 (23 Feb 2024 06:00) (53 - 95)  BP: 162/77 (23 Feb 2024 06:00) (136/79 - 172/90)  BP(mean): --  RR: 18 (23 Feb 2024 06:00) (17 - 18)  SpO2: 100% (23 Feb 2024 06:00) (96% - 100%)    Parameters below as of 23 Feb 2024 06:00  Patient On (Oxygen Delivery Method): room air        PE  NAD  Awake, alert  Anicteric, MMM  No c/c/e  No rash grossly  FROM                          14.2   3.84  )-----------( 326      ( 23 Feb 2024 05:15 )             43.2       02-23    140  |  101  |  22  ----------------------------<  133<H>  4.1   |  30  |  0.53    Ca    9.3      23 Feb 2024 05:15  Phos  3.2     02-23  Mg     2.20     02-23    TPro  5.7<L>  /  Alb  3.6  /  TBili  0.6  /  DBili  0.3  /  AST  18  /  ALT  40<H>  /  AlkPhos  150<H>  02-22

## 2024-02-23 NOTE — PROGRESS NOTE ADULT - SUBJECTIVE AND OBJECTIVE BOX
Name of Patient : KALA VALLEJO  MRN: [id number]  Date of visit: 02-23-24 @ 15:20      Subjective: Patient seen and examined. No new events except as noted.   doing okay  pending discharge and [facility]     REVIEW OF SYSTEMS:    CONSTITUTIONAL: No weakness, fevers or chills  EYES/ENT: No visual changes;  No vertigo or throat pain   NECK: No pain or stiffness  RESPIRATORY: No cough, wheezing, hemoptysis; No shortness of breath  CARDIOVASCULAR: No chest pain or palpitations  GASTROINTESTINAL: No abdominal or epigastric pain. No nausea, vomiting, or hematemesis; No diarrhea or constipation. No melena or hematochezia.  GENITOURINARY: No dysuria, frequency or hematuria  NEUROLOGICAL: No numbness or weakness  SKIN: No itching, burning, rashes, or lesions   All other review of systems is negative unless indicated above.    MEDICATIONS:  MEDICATIONS  (STANDING):  amLODIPine   Tablet 2.5 milliGRAM(s) Oral daily  atenolol  Tablet 50 milliGRAM(s) Oral daily  budesonide  80 MICROgram(s)/formoterol 4.5 MICROgram(s) Inhaler 2 Puff(s) Inhalation two times a day  dexAMETHasone  Injectable 4 milliGRAM(s) IV Push every 6 hours  FLUoxetine 10 milliGRAM(s) Oral daily  pantoprazole    Tablet 40 milliGRAM(s) Oral before breakfast  tiotropium 2.5 MICROgram(s) Inhaler 2 Puff(s) Inhalation daily  valsartan 160 milliGRAM(s) Oral daily      PHYSICAL EXAM:  T(C): 36.7 (02-23-24 @ 13:00), Max: 36.7 (02-22-24 @ 16:15)  HR: 54 (02-23-24 @ 13:00) (53 - 95)  BP: 124/68 (02-23-24 @ 13:00) (124/68 - 172/90)  RR: 18 (02-23-24 @ 13:00) (17 - 18)  SpO2: 97% (02-23-24 @ 13:00) (96% - 100%)  Wt(kg): --  I&O's Summary    22 Feb 2024 07:01  -  23 Feb 2024 07:00  --------------------------------------------------------  IN: 360 mL / OUT: 0 mL / NET: 360 mL    23 Feb 2024 07:01  -  23 Feb 2024 15:20  --------------------------------------------------------  IN: 497 mL / OUT: 0 mL / NET: 497 mL          Appearance: Normal	  HEENT:  PERRLA   Lymphatic: No lymphadenopathy   Cardiovascular: Normal S1 S2, no JVD  Respiratory: normal effort , clear  Gastrointestinal:  Soft, Non-tender  Skin: No rashes,  warm to touch  Psychiatry:  Mood & affect appropriate  Musculuskeletal: No edema    recent labs, Imaging and EKGs personally reviewed     02-22-24 @ 07:01  -  02-23-24 @ 07:00  --------------------------------------------------------  IN: 360 mL / OUT: 0 mL / NET: 360 mL    02-23-24 @ 07:01  -  02-23-24 @ 15:20  --------------------------------------------------------  IN: 497 mL / OUT: 0 mL / NET: 497 mL                          14.2   3.84  )-----------( 326      ( 23 Feb 2024 05:15 )             43.2               02-23    140  |  101  |  22  ----------------------------<  133<H>  4.1   |  30  |  0.53    Ca    9.3      23 Feb 2024 05:15  Phos  3.2     02-23  Mg     2.20     02-23    TPro  5.7<L>  /  Alb  3.6  /  TBili  0.6  /  DBili  0.3  /  AST  18  /  ALT  40<H>  /  AlkPhos  150<H>  02-22                       Urinalysis Basic - ( 23 Feb 2024 05:15 )    Color: x / Appearance: x / SG: x / pH: x  Gluc: 133 mg/dL / Ketone: x  / Bili: x / Urobili: x   Blood: x / Protein: x / Nitrite: x   Leuk Esterase: x / RBC: x / WBC x   Sq Epi: x / Non Sq Epi: x / Bacteria: x

## 2024-02-23 NOTE — PROVIDER CONTACT NOTE (OTHER) - BACKGROUND
Pt admitted with unsteady gait and newly found RT cerebellar mass. PMH lung cancer, a-fib, and HTN.
Pt admitted with unsteady gait and newly found RT cerebellar mass. PMH lung cancer, a-fib, and HTN.

## 2024-02-24 ENCOUNTER — TRANSCRIPTION ENCOUNTER (OUTPATIENT)
Age: 79
End: 2024-02-24

## 2024-02-24 VITALS
OXYGEN SATURATION: 96 % | RESPIRATION RATE: 18 BRPM | SYSTOLIC BLOOD PRESSURE: 162 MMHG | DIASTOLIC BLOOD PRESSURE: 80 MMHG | HEART RATE: 83 BPM | TEMPERATURE: 99 F

## 2024-02-24 LAB
ANION GAP SERPL CALC-SCNC: 8 MMOL/L — SIGNIFICANT CHANGE UP (ref 7–14)
BUN SERPL-MCNC: 24 MG/DL — HIGH (ref 7–23)
CALCIUM SERPL-MCNC: 9.5 MG/DL — SIGNIFICANT CHANGE UP (ref 8.4–10.5)
CHLORIDE SERPL-SCNC: 101 MMOL/L — SIGNIFICANT CHANGE UP (ref 98–107)
CO2 SERPL-SCNC: 31 MMOL/L — SIGNIFICANT CHANGE UP (ref 22–31)
CREAT SERPL-MCNC: 0.65 MG/DL — SIGNIFICANT CHANGE UP (ref 0.5–1.3)
EGFR: 91 ML/MIN/1.73M2 — SIGNIFICANT CHANGE UP
GLUCOSE SERPL-MCNC: 142 MG/DL — HIGH (ref 70–99)
HCT VFR BLD CALC: 47.1 % — HIGH (ref 34.5–45)
HGB BLD-MCNC: 15.1 G/DL — SIGNIFICANT CHANGE UP (ref 11.5–15.5)
MAGNESIUM SERPL-MCNC: 2.3 MG/DL — SIGNIFICANT CHANGE UP (ref 1.6–2.6)
MCHC RBC-ENTMCNC: 28.1 PG — SIGNIFICANT CHANGE UP (ref 27–34)
MCHC RBC-ENTMCNC: 32.1 GM/DL — SIGNIFICANT CHANGE UP (ref 32–36)
MCV RBC AUTO: 87.5 FL — SIGNIFICANT CHANGE UP (ref 80–100)
NRBC # BLD: 0 /100 WBCS — SIGNIFICANT CHANGE UP (ref 0–0)
NRBC # FLD: 0 K/UL — SIGNIFICANT CHANGE UP (ref 0–0)
PHOSPHATE SERPL-MCNC: 3.1 MG/DL — SIGNIFICANT CHANGE UP (ref 2.5–4.5)
PLATELET # BLD AUTO: 351 K/UL — SIGNIFICANT CHANGE UP (ref 150–400)
POTASSIUM SERPL-MCNC: 5.2 MMOL/L — SIGNIFICANT CHANGE UP (ref 3.5–5.3)
POTASSIUM SERPL-SCNC: 5.2 MMOL/L — SIGNIFICANT CHANGE UP (ref 3.5–5.3)
RBC # BLD: 5.38 M/UL — HIGH (ref 3.8–5.2)
RBC # FLD: 13.8 % — SIGNIFICANT CHANGE UP (ref 10.3–14.5)
SODIUM SERPL-SCNC: 140 MMOL/L — SIGNIFICANT CHANGE UP (ref 135–145)
WBC # BLD: 3.86 K/UL — SIGNIFICANT CHANGE UP (ref 3.8–10.5)
WBC # FLD AUTO: 3.86 K/UL — SIGNIFICANT CHANGE UP (ref 3.8–10.5)

## 2024-02-24 RX ADMIN — ATENOLOL 50 MILLIGRAM(S): 25 TABLET ORAL at 09:16

## 2024-02-24 RX ADMIN — Medication 4 MILLIGRAM(S): at 06:02

## 2024-02-24 RX ADMIN — PANTOPRAZOLE SODIUM 40 MILLIGRAM(S): 20 TABLET, DELAYED RELEASE ORAL at 06:02

## 2024-02-24 RX ADMIN — Medication 4 MILLIGRAM(S): at 17:16

## 2024-02-24 RX ADMIN — VALSARTAN 160 MILLIGRAM(S): 80 TABLET ORAL at 06:01

## 2024-02-24 RX ADMIN — Medication 4 MILLIGRAM(S): at 00:32

## 2024-02-24 RX ADMIN — BUDESONIDE AND FORMOTEROL FUMARATE DIHYDRATE 2 PUFF(S): 160; 4.5 AEROSOL RESPIRATORY (INHALATION) at 09:15

## 2024-02-24 RX ADMIN — Medication 10 MILLIGRAM(S): at 11:13

## 2024-02-24 RX ADMIN — TIOTROPIUM BROMIDE 2 PUFF(S): 18 CAPSULE ORAL; RESPIRATORY (INHALATION) at 09:16

## 2024-02-24 RX ADMIN — Medication 4 MILLIGRAM(S): at 11:13

## 2024-02-24 RX ADMIN — AMLODIPINE BESYLATE 2.5 MILLIGRAM(S): 2.5 TABLET ORAL at 06:02

## 2024-02-24 NOTE — DISCHARGE NOTE NURSING/CASE MANAGEMENT/SOCIAL WORK - PATIENT PORTAL LINK FT
You can access the FollowMyHealth Patient Portal offered by [facility] by registering at the following website: [email or web address]. By joining [employer]’s FollowMyHealth portal, you will also be able to view your health information using other applications (apps) compatible with our system.

## 2024-02-24 NOTE — PROGRESS NOTE ADULT - PROBLEM SELECTOR PLAN 5
c/w Symbicort and Spiriva for Trellegy Ellipta   c/w Albuterol prn

## 2024-02-24 NOTE — PROGRESS NOTE ADULT - SUBJECTIVE AND OBJECTIVE BOX
Name of Patient : KALA VALLEJO  MRN: [id number]  Date of visit: 02-24-24       Subjective: Patient seen and examined. No new events except as noted.   Doing okay     REVIEW OF SYSTEMS:    CONSTITUTIONAL: No weakness, fevers or chills  EYES/ENT: No visual changes;  No vertigo or throat pain   NECK: No pain or stiffness  RESPIRATORY: No cough, wheezing, hemoptysis; No shortness of breath  CARDIOVASCULAR: No chest pain or palpitations  GASTROINTESTINAL: No abdominal or epigastric pain. No nausea, vomiting, or hematemesis; No diarrhea or constipation. No melena or hematochezia.  GENITOURINARY: No dysuria, frequency or hematuria  NEUROLOGICAL: No numbness or weakness  SKIN: No itching, burning, rashes, or lesions   All other review of systems is negative unless indicated above.    MEDICATIONS:  MEDICATIONS  (STANDING):  amLODIPine   Tablet 2.5 milliGRAM(s) Oral daily  atenolol  Tablet 50 milliGRAM(s) Oral daily  budesonide  80 MICROgram(s)/formoterol 4.5 MICROgram(s) Inhaler 2 Puff(s) Inhalation two times a day  dexAMETHasone  Injectable 4 milliGRAM(s) IV Push every 6 hours  FLUoxetine 10 milliGRAM(s) Oral daily  pantoprazole    Tablet 40 milliGRAM(s) Oral before breakfast  tiotropium 2.5 MICROgram(s) Inhaler 2 Puff(s) Inhalation daily  valsartan 160 milliGRAM(s) Oral daily      PHYSICAL EXAM:  T(C): 37 (02-24-24 @ 09:00), Max: 37 (02-24-24 @ 09:00)  HR: 83 (02-24-24 @ 09:00) (53 - 83)  BP: 162/80 (02-24-24 @ 09:00) (144/80 - 162/80)  RR: 18 (02-24-24 @ 09:00) (18 - 18)  SpO2: 96% (02-24-24 @ 09:00) (96% - 98%)  Wt(kg): --  I&O's Summary    23 Feb 2024 07:01  -  24 Feb 2024 07:00  --------------------------------------------------------  IN: 977 mL / OUT: 0 mL / NET: 977 mL    24 Feb 2024 07:01  -  24 Feb 2024 19:35  --------------------------------------------------------  IN: 757 mL / OUT: 0 mL / NET: 757 mL          Appearance: Normal	  HEENT:  PERRLA   Lymphatic: No lymphadenopathy   Cardiovascular: Normal S1 S2, no JVD  Respiratory: normal effort , clear  Gastrointestinal:  Soft, Non-tender  Skin: No rashes,  warm to touch  Psychiatry:  Mood & affect appropriate  Musculuskeletal: No edema    recent labs, Imaging and EKGs personally reviewed     02-23-24 @ 07:01  -  02-24-24 @ 07:00  --------------------------------------------------------  IN: 977 mL / OUT: 0 mL / NET: 977 mL    02-24-24 @ 07:01  -  02-24-24 @ 19:35  --------------------------------------------------------  IN: 757 mL / OUT: 0 mL / NET: 757 mL                          15.1   3.86  )-----------( 351      ( 24 Feb 2024 04:15 )             47.1               02-24    140  |  101  |  24<H>  ----------------------------<  142<H>  5.2   |  31  |  0.65    Ca    9.5      24 Feb 2024 04:15  Phos  3.1     02-24  Mg     2.30     02-24                         Urinalysis Basic - ( 24 Feb 2024 04:15 )    Color: x / Appearance: x / SG: x / pH: x  Gluc: 142 mg/dL / Ketone: x  / Bili: x / Urobili: x   Blood: x / Protein: x / Nitrite: x   Leuk Esterase: x / RBC: x / WBC x   Sq Epi: x / Non Sq Epi: x / Bacteria: x

## 2024-02-24 NOTE — PROGRESS NOTE ADULT - PROBLEM SELECTOR PLAN 7
c/w Xanax prn  c/w Prozac

## 2024-02-24 NOTE — DISCHARGE NOTE NURSING/CASE MANAGEMENT/SOCIAL WORK - NSDCFUADDAPPT_GEN_ALL_CORE_FT
You can follow up outpatient with [facility] Radiation Oncology Dr. Sevilla.   Follow up with your team at [place]: Dr. Woo Leon of [facility] and Dr. Fátima Cardenas

## 2024-02-24 NOTE — PROGRESS NOTE ADULT - PROVIDER SPECIALTY LIST ADULT
Heme/Onc
Heme/Onc
Cardiology
Heme/Onc
Heme/Onc
Internal Medicine
Neurology
Internal Medicine

## 2024-02-24 NOTE — DISCHARGE NOTE NURSING/CASE MANAGEMENT/SOCIAL WORK - NSCORESITESY/N_GEN_A_CORE_RD
Group Topic: BH Symptom Management    Date: 4/6/2023  Start Time:  2:00 PM  End Time:  2:50 PM  Facilitators: DAWIT Madrid    Focus: Symptom Management- SMART Goals (Daily Life Skills)  Number in attendance: 8  A lecture was given on SMART Goals. Writer broke down what a SMART GOAL is and valid questions to ask self when setting SMART Goals. Pts did participate in a group discussion.     Method: Group  Attendance: Present  Patient Response: Poor eye contact  Pt appeared resistant in group when asking him about his SMART goal. Pt appeared to have poor eye contact.   DAWIT Madrid     No

## 2024-02-24 NOTE — PROGRESS NOTE ADULT - PROBLEM SELECTOR PLAN 2
PT eval  Fall precautions

## 2024-02-24 NOTE — PROGRESS NOTE ADULT - PROBLEM SELECTOR PLAN 3
[id number] risk stratification score 4    -Holding Xarelto as above   -Screening EKG ordered  -c/w Atenolol  - Card eval appreciated
[id number] risk stratification score 4    -Holding Xarelto as above   -Screening EKG   -c/w Atenolol  - Card eval appreciated
[id number] risk stratification score 4    -Holding Xarelto as above   -Screening EKG ordered  -c/w Atenolol  - Card eval called
[id number] risk stratification score 4    -Holding Xarelto as above   -Screening EKG   -c/w Atenolol  - Card eval appreciated
[id number] risk stratification score 4    -Holding Xarelto as above   -Screening EKG ordered  -c/w Atenolol  - Card eval appreciated

## 2024-02-24 NOTE — PROGRESS NOTE ADULT - SUBJECTIVE AND OBJECTIVE BOX
CHIEF COMPLAINT  Unsteady Gait    HISTORY OF PRESENT ILLNESS  KALA VALLEJO is a 78y Female who presents with a chief complaint of unsteady gait    No acute events. No complaints.    REVIEW OF SYSTEMS  A complete review of systems was performed; negative except per HPI    PHYSICAL EXAM  T(C): 37 (02-24-24 @ 09:00), Max: 37 (02-24-24 @ 09:00)  HR: 83 (02-24-24 @ 09:00) (53 - 83)  BP: 162/80 (02-24-24 @ 09:00) (124/68 - 162/80)  RR: 18 (02-24-24 @ 09:00) (17 - 18)  SpO2: 96% (02-24-24 @ 09:00) (96% - 98%)  Constitutional: alert, awake, in no acute distress  Eyes: PERRL, EOMI  HEENT: normocephalic, atraumatic  Neck: supple, non-tender  Cardiovascular: normal perfusion, no peripheral edema  Respiratory: normal respiratory efforts; no increased use of accessory muscles  Gastrointestinal: soft, non-tender  Musculoskeletal: normal range of motion, no deformities noted  Neurological: alert, CN II to XI grossly intact  Skin: warm, dry    LABORATORY DATA                        15.1   3.86  )-----------( 351      ( 24 Feb 2024 04:15 )             47.1     02-24    140  |  101  |  24<H>  ----------------------------<  142<H>  5.2   |  31  |  0.65    Ca    9.5      24 Feb 2024 04:15  Phos  3.1     02-24  Mg     2.30     02-24   CHIEF COMPLAINT  Unsteady Gait    HISTORY OF PRESENT ILLNESS  KALA VALLEJO is a 78y Female who presents with a chief complaint of unsteady gait    No acute events. Daughter reporting worsening confusion and ongoing gait abnormalities    REVIEW OF SYSTEMS  A complete review of systems was performed; negative except per HPI    PHYSICAL EXAM  T(C): 37 (02-24-24 @ 09:00), Max: 37 (02-24-24 @ 09:00)  HR: 83 (02-24-24 @ 09:00) (53 - 83)  BP: 162/80 (02-24-24 @ 09:00) (124/68 - 162/80)  RR: 18 (02-24-24 @ 09:00) (17 - 18)  SpO2: 96% (02-24-24 @ 09:00) (96% - 98%)  Constitutional: awake, in no acute distress  Eyes: PERRL, EOMI  HEENT: normocephalic, atraumatic  Neck: supple, non-tender  Cardiovascular: normal perfusion, no peripheral edema  Respiratory: normal respiratory efforts; no increased use of accessory muscles  Gastrointestinal: soft, non-tender  Musculoskeletal: normal range of motion, no deformities noted  Skin: warm, dry    LABORATORY DATA                        15.1   3.86  )-----------( 351      ( 24 Feb 2024 04:15 )             47.1     02-24    140  |  101  |  24<H>  ----------------------------<  142<H>  5.2   |  31  |  0.65    Ca    9.5      24 Feb 2024 04:15  Phos  3.1     02-24  Mg     2.30     02-24

## 2024-02-24 NOTE — PROGRESS NOTE ADULT - REASON FOR ADMISSION
Unsteady gait and trip and falls

## 2024-02-24 NOTE — PROGRESS NOTE ADULT - PROBLEM SELECTOR PLAN 8
SCDs  Hold heparin pending MRI brain w/wo con

## 2024-02-24 NOTE — PROGRESS NOTE ADULT - PROBLEM SELECTOR PLAN 1
Newly Dx c/b falls;  c/f metastatic disease given know lung SCC and large RT hilar mass.   CTH 2/19/24 from outside hospital: Right cerebellar mass measuring 3.4 x 3.2 cm w/ surrounding hyper-densities c/w metastasis w/ associated vasogenic edema. Minimal mass effect to right lateral aspect of fourth ventricle. No acute hydrocephalus.     -Evaluated by NSx  -MRI brain w/wo contrast noted   -Decadron IV 4q6h, cont till follow up till [facility] transfer   -Fall, seizure precautions   -Onc eval appreciated   -Radiation oncology per onc  - may need to [place] [facility] [place]
Newly Dx c/b falls;  c/f metastatic disease given know lung SCC and large RT hilar mass.   CTH 2/19/24 from outside hospital: Right cerebellar mass measuring 3.4 x 3.2 cm w/ surrounding hyper-densities c/w metastasis w/ associated vasogenic edema. Minimal mass effect to right lateral aspect of fourth ventricle. No acute hydrocephalus.     -Evaluated by NSx  -MRI brain w/wo contrast noted   -Decadron IV 4q6h, cont till follow up till [facility] transfer   -Fall, seizure precautions   -Onc eval appreciated   -Radiation oncology per onc  - may need to transfer to [facility] [place] for radiation
Newly Dx c/b falls;  c/f metastatic disease given know lung SCC and large RT hilar mass.   CTH 2/19/24 from outside hospital: Right cerebellar mass measuring 3.4 x 3.2 cm w/ surrounding hyper-densities c/w metastasis w/ associated vasogenic edema. Minimal mass effect to right lateral aspect of fourth ventricle. No acute hydrocephalus.     -Evaluated by NSx  -MRI brain w/wo contrast noted   -Decadron IV 4q6h, cont   -Fall, seizure precautions   -Onc eval appreciated   -Radiation oncology per onc  - may need to transfer [place] [facility] [place]
Newly Dx c/b falls;  c/f metastatic disease given know lung SCC and large RT hilar mass.   CTH 2/19/24 from outside hospital: Right cerebellar mass measuring 3.4 x 3.2 cm w/ surrounding hyper-densities c/w metastasis w/ associated vasogenic edema. Minimal mass effect to right lateral aspect of fourth ventricle. No acute hydrocephalus.     -Evaluated by NSx  -MRI brain w/wo contrast noted   -Decadron IV 4q6h, cont till follow up till [facility] transfer   -Fall, seizure precautions   -Onc eval appreciated   -Radiation oncology per onc  - may need to transfer to [facility] [place]
Newly Dx c/b falls;  c/f metastatic disease given know lung SCC and large RT hilar mass.   CTH 2/19/24 from outside hospital: Right cerebellar mass measuring 3.4 x 3.2 cm w/ surrounding hyper-densities c/w metastasis w/ associated vasogenic edema. Minimal mass effect to right lateral aspect of fourth ventricle. No acute hydrocephalus.     -Evaluated by NSx  -MRI brain w/wo contrast ordered   -Decadron IV 4q6h  -Fall, seizure precautions   -Onc eval appreciated   -Radiation oncology per onc

## 2024-02-24 NOTE — PROGRESS NOTE ADULT - ASSESSMENT
78-year-old female with  medical history of COPD, HTN, Anxiety, Afib (on Xarelto), CML (he was on Bosulif which was discontinued due to elev LFT), HLD (Rosuvastatin held due to elev LFTs), h/o Lung ca (SCC, large 1p2m2wi Rt perihilar mass) a/w unsteady gait and newly found RT cerebellar mass with associated vasogenic edema. Evaluated by NSx, MRI brain pending. Elevated LFTs. 
PAF  in sinus  a/c on hold due to newly discovered cerebellar mass with falls   cont BB  resume a/c in future once deemed safe  Pt follows with Dr Van     HTN  stable    Cerebellar mass  plan as per Neurology / nsx
This is a 78 year old female with lung cancer and CML who presents with dizziness and frequent falls due to new brain metastases.     1. Lung cancer   -- She was initially diagnosed with stage II squamous cell lung cancer, treated with carbo/taxol + RT followed by durvalumab, completed 09/27/2023  -- Her most recent surveillance CT scans w/o evidence of recurrence or POD. CTH from 12/19/2023 w/o evidence of intracranial abnormalities   -- Under care of Dr. Woo Leon of [facility] [place]     2. CML   -- Most recently on bosutinib but held 01/29/2024 due to grade IV transaminitis   -- Cont to hold systemic treatment while admitted   -- Follow up with Dr. Fátima Cardenas of [facility] after discharge     3. Brain metastases   -- Pt presented with dizziness, gait instability, and frequent falls   -- CTH from [facility] [place] showed cerebellar mass w vasogenic edema and mass effect on adjacent ventricle   -- MRI head shows heterogeneously enhancing 3.8 x 3.5 x 2.9 cm mass in the RIGHT cerebellum with mild mass effect on the fourth ventricle without obstruction.  -- Continue with dexamethasone 4mg q6  -- Neurosurgery following  -- Rad onc consulted, f/u recommendations   -- Pt's team at [facility] requesting transfer    4. Transaminitis   -- Likely adverse effect from bosutinib which has been held   -- LFTs are significantly improved compared to prior labs done outpatient on 01/25 (Alk phos 250, , )    Will continue to follow.    Ashley Jones PA-C  Hematology/Oncology  [facility]  [phone] (office)  [phone] (alt office)  Evenings and weekends please call MD on call or office  
KALA VALLEJO is a 78y Female who presents with a chief complaint of unsteady gait    Lung Cancer  ·	Patient follows with Dr. Woo Leon, [facility].  ·	Previously completed chemoradiation followed by durvalumab completed in September 2023.  ·	MRI of the brain shows enhancing 3.8 cm mass in the right cerebellum with vasogenic edema. Patient remains on dexamethasone. Neurosurgery and radiation oncology recommendations noted.  ·	Pending transfer to [facility] for neurosurgery interventions    Chronic Myeloid Leukemia  ·	Patient follows with Dr. Fátima Cardenas, [facility].  ·	Bosutinub was held in January 2024 due to transaminitis  ·	Continue to hold systemic therapy.    Will continue to follow.    Jonah Welsh MD  Hematology/Oncology  O: [phone]
77yo F PMH HTN, afib on Xarelto, lung cancer and CML presenting w/ dizziness and frequent falls x 1 week with CTH from outside hospital showing new right cerebellar mass with surrounding vasogenic edema.  Grossly non-focal on exam, ataxic gait upon ambulation    R cerebellar mass  - likely mets  Afib  Transaminitis    MRI brain with and without reviewed  Rad Onc following  CT CAP   steroids per neurosurgery  Restart AC if no contraindication  PT/OT  Pending transfer to [facility] [place] [facility] for further eval       
This is a 78 year old female with lung cancer and CML who presents with dizziness and frequent falls due to new brain metastases.     1. Lung cancer   -- She was initially diagnosed with stage II squamous cell lung cancer, treated with carbo/taxol + RT followed by durvalumab, completed 09/27/2023  -- Her most recent surveillance CT scans w/o evidence of recurrence or POD. CTH from 12/19/2023 w/o evidence of intracranial abnormalities   -- Under care of Dr. Woo Leon of [facility]     2. CML   -- Most recently on bosutinib but held 01/29/2024 due to grade IV transaminitis   -- Cont to hold systemic treatment while admitted   -- Follow up with Dr. Fátiam Cardenas of [facility] after discharge     3. Brain metastases   -- Pt presented with dizziness, gait instability, and frequent falls   -- CTH from [facility] [place] showed cerebellar mass w vasogenic edema and mass effect on adjacent ventricle   -- MRI head shows heterogeneously enhancing 3.8 x 3.5 x 2.9 cm mass in the RIGHT cerebellum with mild mass effect on the fourth ventricle without obstruction.  -- Continue with dexamethasone 4mg q6  -- Neurosurgery following  -- Rad onc consulted, recommending outpatient [facility]RS if pt wishes to follow with them  -- Pending transfer to [facility] for neurosurgery intervention, waiting for available bed     4. Transaminitis   -- Likely adverse effect from bosutinib which has been held   -- LFTs are significantly improved compared to prior labs done outpatient on 01/25 (Alk phos 250, , )    Will continue to follow.    Ashley Jones PA-C  Hematology/Oncology  [facility]  [phone] (office)  [phone] (alt office)  Evenings and weekends please call MD on call or office  
This is a 78 year old female with lung cancer and CML who presents with dizziness and frequent falls due to new brain metastases.     1. Lung cancer   -- She was initially diagnosed with stage II squamous cell lung cancer, treated with carbo/taxol + RT followed by durvalumab, completed 09/27/2023  -- Her most recent surveillance CT scans w/o evidence of recurrence or POD. CTH from 12/19/2023 w/o evidence of intracranial abnormalities   -- Under care of Dr. Woo Leon of [facility]     2. CML   -- Most recently on bosutinib but held 01/29/2024 due to grade IV transaminitis   -- Cont to hold systemic treatment while admitted   -- Follow up with Dr. Fátima Cardenas of [facility] after discharge     3. Brain metastases   -- Pt presented with dizziness, gait instability, and frequent falls   -- CTH from [facility] showed cerebellar mass w vasogenic edema and mass effect on adjacent ventricle   -- MRI head shows heterogeneously enhancing 3.8 x 3.5 x 2.9 cm mass in the RIGHT cerebellum with mild mass effect on the fourth ventricle without obstruction.  -- Continue with dexamethasone 4mg q6. Cont PPI with high dose steroids.   -- Neurosurgery following  -- Rad onc consulted, recommending outpatient [facility] if pt wishes to follow with them  -- Pending transfer to [facility] for neurosurgery intervention, waiting for available bed   -- PT rec outpatient PT.    4. Transaminitis   -- Likely adverse effect from bosutinib which has been held   -- LFTs are significantly improved compared to prior labs done outpatient on 01/25 (Alk phos 250, , )    Will continue to follow.    Ashley Jones PA-C  Hematology/Oncology  [facility]  [phone] (office)  [phone] (alt office)  Evenings and weekends please call MD on call or office  
78-year-old female with  medical history of COPD, HTN, Anxiety, Afib (on Xarelto), CML (he was on Bosulif which was discontinued due to elev LFT), HLD (Rosuvastatin held due to elev LFTs), h/o Lung ca (SCC, large 0p4d1qn Rt perihilar mass) a/w unsteady gait and newly found RT cerebellar mass with associated vasogenic edema. Evaluated by NSx, MRI brain pending. Elevated LFTs. 
78-year-old female with  medical history of COPD, HTN, Anxiety, Afib (on Xarelto), CML (he was on Bosulif which was discontinued due to elev LFT), HLD (Rosuvastatin held due to elev LFTs), h/o Lung ca (SCC, large 3j5g3gw Rt perihilar mass) a/w unsteady gait and newly found RT cerebellar mass with associated vasogenic edema. Evaluated by NSx, MRI brain pending. Elevated LFTs. 
78-year-old female with  medical history of COPD, HTN, Anxiety, Afib (on Xarelto), CML (he was on Bosulif which was discontinued due to elev LFT), HLD (Rosuvastatin held due to elev LFTs), h/o Lung ca (SCC, large 2o1n4kt Rt perihilar mass) a/w unsteady gait and newly found RT cerebellar mass with associated vasogenic edema. Evaluated by NSx, MRI brain pending. Elevated LFTs. 
78-year-old female with  medical history of COPD, HTN, Anxiety, Afib (on Xarelto), CML (he was on Bosulif which was discontinued due to elev LFT), HLD (Rosuvastatin held due to elev LFTs), h/o Lung ca (SCC, large 2t1j2gg Rt perihilar mass) a/w unsteady gait and newly found RT cerebellar mass with associated vasogenic edema. Evaluated by NSx, MRI brain pending. Elevated LFTs.

## 2024-02-24 NOTE — PROGRESS NOTE ADULT - PROBLEM SELECTOR PLAN 6
c/w Valsartan, Atenolol, Amlodipine

## 2024-03-15 ENCOUNTER — INPATIENT (INPATIENT)
Facility: HOSPITAL | Age: 79
LOS: 4 days | Discharge: HOME | End: 2024-03-20
Attending: INTERNAL MEDICINE | Admitting: INTERNAL MEDICINE
Payer: MEDICARE

## 2024-03-15 VITALS
HEIGHT: 63 IN | HEART RATE: 91 BPM | SYSTOLIC BLOOD PRESSURE: 137 MMHG | WEIGHT: 128.09 LBS | RESPIRATION RATE: 17 BRPM | DIASTOLIC BLOOD PRESSURE: 73 MMHG | TEMPERATURE: 102 F

## 2024-03-15 DIAGNOSIS — C34.90 MALIGNANT NEOPLASM OF UNSPECIFIED PART OF UNSPECIFIED BRONCHUS OR LUNG: ICD-10-CM

## 2024-03-15 DIAGNOSIS — Z90.710 ACQUIRED ABSENCE OF BOTH CERVIX AND UTERUS: Chronic | ICD-10-CM

## 2024-03-15 DIAGNOSIS — C92.10 CHRONIC MYELOID LEUKEMIA, BCR/ABL-POSITIVE, NOT HAVING ACHIEVED REMISSION: ICD-10-CM

## 2024-03-15 DIAGNOSIS — L05.91 PILONIDAL CYST WITHOUT ABSCESS: Chronic | ICD-10-CM

## 2024-03-15 LAB
ALBUMIN SERPL ELPH-MCNC: 2.4 G/DL — LOW (ref 3.3–5)
ALP SERPL-CCNC: 134 U/L — HIGH (ref 40–120)
ALT FLD-CCNC: 43 U/L — SIGNIFICANT CHANGE UP (ref 12–78)
ANION GAP SERPL CALC-SCNC: 8 MMOL/L — SIGNIFICANT CHANGE UP (ref 5–17)
ANISOCYTOSIS BLD QL: SIGNIFICANT CHANGE UP
APPEARANCE UR: CLEAR — SIGNIFICANT CHANGE UP
APTT BLD: 28.8 SEC — SIGNIFICANT CHANGE UP (ref 24.5–35.6)
AST SERPL-CCNC: 29 U/L — SIGNIFICANT CHANGE UP (ref 15–37)
BACTERIA # UR AUTO: ABNORMAL /HPF
BASOPHILS # BLD AUTO: 0.3 K/UL — HIGH (ref 0–0.2)
BASOPHILS NFR BLD AUTO: 3 % — HIGH (ref 0–2)
BILIRUB SERPL-MCNC: 0.5 MG/DL — SIGNIFICANT CHANGE UP (ref 0.2–1.2)
BILIRUB UR-MCNC: NEGATIVE — SIGNIFICANT CHANGE UP
BUN SERPL-MCNC: 15 MG/DL — SIGNIFICANT CHANGE UP (ref 7–23)
CALCIUM SERPL-MCNC: 9.6 MG/DL — SIGNIFICANT CHANGE UP (ref 8.5–10.1)
CHLORIDE SERPL-SCNC: 105 MMOL/L — SIGNIFICANT CHANGE UP (ref 96–108)
CO2 SERPL-SCNC: 29 MMOL/L — SIGNIFICANT CHANGE UP (ref 22–31)
COLOR SPEC: YELLOW — SIGNIFICANT CHANGE UP
CREAT SERPL-MCNC: 0.55 MG/DL — SIGNIFICANT CHANGE UP (ref 0.5–1.3)
DIFF PNL FLD: NEGATIVE — SIGNIFICANT CHANGE UP
EGFR: 94 ML/MIN/1.73M2 — SIGNIFICANT CHANGE UP
EOSINOPHIL # BLD AUTO: 0.1 K/UL — SIGNIFICANT CHANGE UP (ref 0–0.5)
EOSINOPHIL NFR BLD AUTO: 1 % — SIGNIFICANT CHANGE UP (ref 0–6)
EPI CELLS # UR: PRESENT
GLUCOSE SERPL-MCNC: 107 MG/DL — HIGH (ref 70–99)
GLUCOSE UR QL: NEGATIVE MG/DL — SIGNIFICANT CHANGE UP
HCT VFR BLD CALC: 38.5 % — SIGNIFICANT CHANGE UP (ref 34.5–45)
HGB BLD-MCNC: 12.4 G/DL — SIGNIFICANT CHANGE UP (ref 11.5–15.5)
INR BLD: 0.86 RATIO — SIGNIFICANT CHANGE UP (ref 0.85–1.18)
KETONES UR-MCNC: NEGATIVE MG/DL — SIGNIFICANT CHANGE UP
LACTATE SERPL-SCNC: 1.1 MMOL/L — SIGNIFICANT CHANGE UP (ref 0.7–2)
LEUKOCYTE ESTERASE UR-ACNC: ABNORMAL
LG PLATELETS BLD QL AUTO: SLIGHT — SIGNIFICANT CHANGE UP
LYMPHOCYTES # BLD AUTO: 0.2 K/UL — LOW (ref 1–3.3)
LYMPHOCYTES # BLD AUTO: 2 % — LOW (ref 13–44)
MACROCYTES BLD QL: SIGNIFICANT CHANGE UP
MCHC RBC-ENTMCNC: 28.4 PG — SIGNIFICANT CHANGE UP (ref 27–34)
MCHC RBC-ENTMCNC: 32.2 G/DL — SIGNIFICANT CHANGE UP (ref 32–36)
MCV RBC AUTO: 88.3 FL — SIGNIFICANT CHANGE UP (ref 80–100)
MONOCYTES # BLD AUTO: 0.4 K/UL — SIGNIFICANT CHANGE UP (ref 0–0.9)
MONOCYTES NFR BLD AUTO: 4 % — SIGNIFICANT CHANGE UP (ref 2–14)
NEUTROPHILS # BLD AUTO: 8.38 K/UL — HIGH (ref 1.8–7.4)
NEUTROPHILS NFR BLD AUTO: 79 % — HIGH (ref 43–77)
NEUTS BAND # BLD: 4 % — SIGNIFICANT CHANGE UP (ref 0–8)
NITRITE UR-MCNC: NEGATIVE — SIGNIFICANT CHANGE UP
NRBC # BLD: 0 /100 WBCS — SIGNIFICANT CHANGE UP (ref 0–0)
NRBC # BLD: SIGNIFICANT CHANGE UP /100 WBCS (ref 0–0)
OVALOCYTES BLD QL SMEAR: SLIGHT — SIGNIFICANT CHANGE UP
PH UR: 7.5 — SIGNIFICANT CHANGE UP (ref 5–8)
PLAT MORPH BLD: NORMAL — SIGNIFICANT CHANGE UP
PLATELET # BLD AUTO: 351 K/UL — SIGNIFICANT CHANGE UP (ref 150–400)
PLATELET COUNT - ESTIMATE: NORMAL — SIGNIFICANT CHANGE UP
POIKILOCYTOSIS BLD QL AUTO: SLIGHT — SIGNIFICANT CHANGE UP
POTASSIUM SERPL-MCNC: 3.8 MMOL/L — SIGNIFICANT CHANGE UP (ref 3.5–5.3)
POTASSIUM SERPL-SCNC: 3.8 MMOL/L — SIGNIFICANT CHANGE UP (ref 3.5–5.3)
PROT SERPL-MCNC: 6 GM/DL — SIGNIFICANT CHANGE UP (ref 6–8.3)
PROT UR-MCNC: SIGNIFICANT CHANGE UP MG/DL
PROTHROM AB SERPL-ACNC: 10.4 SEC — SIGNIFICANT CHANGE UP (ref 9.5–13)
RAPID RVP RESULT: SIGNIFICANT CHANGE UP
RBC # BLD: 4.36 M/UL — SIGNIFICANT CHANGE UP (ref 3.8–5.2)
RBC # FLD: 15 % — HIGH (ref 10.3–14.5)
RBC BLD AUTO: SIGNIFICANT CHANGE UP
RBC CASTS # UR COMP ASSIST: 1 /HPF — SIGNIFICANT CHANGE UP (ref 0–4)
SARS-COV-2 RNA SPEC QL NAA+PROBE: SIGNIFICANT CHANGE UP
SODIUM SERPL-SCNC: 142 MMOL/L — SIGNIFICANT CHANGE UP (ref 135–145)
SP GR SPEC: 1.02 — SIGNIFICANT CHANGE UP (ref 1–1.03)
UROBILINOGEN FLD QL: 1 MG/DL — SIGNIFICANT CHANGE UP (ref 0.2–1)
VARIANT LYMPHS # BLD: 7 % — HIGH (ref 0–6)
WBC # BLD: 10.1 K/UL — SIGNIFICANT CHANGE UP (ref 3.8–10.5)
WBC # FLD AUTO: 10.1 K/UL — SIGNIFICANT CHANGE UP (ref 3.8–10.5)
WBC UR QL: 4 /HPF — SIGNIFICANT CHANGE UP (ref 0–5)

## 2024-03-15 PROCEDURE — 99222 1ST HOSP IP/OBS MODERATE 55: CPT

## 2024-03-15 PROCEDURE — 70491 CT SOFT TISSUE NECK W/DYE: CPT | Mod: 26,MC

## 2024-03-15 PROCEDURE — 99285 EMERGENCY DEPT VISIT HI MDM: CPT

## 2024-03-15 PROCEDURE — 70450 CT HEAD/BRAIN W/O DYE: CPT | Mod: 26,MC

## 2024-03-15 PROCEDURE — 71045 X-RAY EXAM CHEST 1 VIEW: CPT | Mod: 26

## 2024-03-15 RX ORDER — AMLODIPINE BESYLATE 2.5 MG/1
2.5 TABLET ORAL DAILY
Refills: 0 | Status: DISCONTINUED | OUTPATIENT
Start: 2024-03-15 | End: 2024-03-16

## 2024-03-15 RX ORDER — BUDESONIDE AND FORMOTEROL FUMARATE DIHYDRATE 160; 4.5 UG/1; UG/1
2 AEROSOL RESPIRATORY (INHALATION)
Refills: 0 | Status: DISCONTINUED | OUTPATIENT
Start: 2024-03-15 | End: 2024-03-16

## 2024-03-15 RX ORDER — VANCOMYCIN HCL 1 G
750 VIAL (EA) INTRAVENOUS EVERY 12 HOURS
Refills: 0 | Status: DISCONTINUED | OUTPATIENT
Start: 2024-03-15 | End: 2024-03-15

## 2024-03-15 RX ORDER — ATENOLOL 25 MG/1
50 TABLET ORAL DAILY
Refills: 0 | Status: DISCONTINUED | OUTPATIENT
Start: 2024-03-15 | End: 2024-03-20

## 2024-03-15 RX ORDER — LANOLIN ALCOHOL/MO/W.PET/CERES
3 CREAM (GRAM) TOPICAL AT BEDTIME
Refills: 0 | Status: DISCONTINUED | OUTPATIENT
Start: 2024-03-15 | End: 2024-03-20

## 2024-03-15 RX ORDER — ATENOLOL 25 MG/1
1 TABLET ORAL
Qty: 0 | Refills: 0 | DISCHARGE

## 2024-03-15 RX ORDER — SODIUM CHLORIDE 9 MG/ML
1800 INJECTION INTRAMUSCULAR; INTRAVENOUS; SUBCUTANEOUS ONCE
Refills: 0 | Status: COMPLETED | OUTPATIENT
Start: 2024-03-15 | End: 2024-03-15

## 2024-03-15 RX ORDER — ALBUTEROL 90 UG/1
2 AEROSOL, METERED ORAL EVERY 6 HOURS
Refills: 0 | Status: DISCONTINUED | OUTPATIENT
Start: 2024-03-15 | End: 2024-03-20

## 2024-03-15 RX ORDER — ONDANSETRON 8 MG/1
4 TABLET, FILM COATED ORAL EVERY 8 HOURS
Refills: 0 | Status: DISCONTINUED | OUTPATIENT
Start: 2024-03-15 | End: 2024-03-20

## 2024-03-15 RX ORDER — CLONAZEPAM 1 MG
0 TABLET ORAL
Qty: 0 | Refills: 0 | DISCHARGE

## 2024-03-15 RX ORDER — FLUOXETINE HCL 10 MG
1 CAPSULE ORAL
Refills: 0 | DISCHARGE

## 2024-03-15 RX ORDER — ONDANSETRON 8 MG/1
1 TABLET, FILM COATED ORAL
Qty: 0 | Refills: 0 | DISCHARGE

## 2024-03-15 RX ORDER — VALSARTAN 80 MG/1
160 TABLET ORAL DAILY
Refills: 0 | Status: DISCONTINUED | OUTPATIENT
Start: 2024-03-15 | End: 2024-03-20

## 2024-03-15 RX ORDER — FLUOXETINE HCL 10 MG
10 CAPSULE ORAL DAILY
Refills: 0 | Status: DISCONTINUED | OUTPATIENT
Start: 2024-03-15 | End: 2024-03-20

## 2024-03-15 RX ORDER — CLONAZEPAM 1 MG
0.25 TABLET ORAL
Refills: 0 | Status: DISCONTINUED | OUTPATIENT
Start: 2024-03-15 | End: 2024-03-15

## 2024-03-15 RX ORDER — VANCOMYCIN HCL 1 G
1000 VIAL (EA) INTRAVENOUS ONCE
Refills: 0 | Status: DISCONTINUED | OUTPATIENT
Start: 2024-03-15 | End: 2024-03-15

## 2024-03-15 RX ORDER — ACETAMINOPHEN 500 MG
650 TABLET ORAL EVERY 6 HOURS
Refills: 0 | Status: DISCONTINUED | OUTPATIENT
Start: 2024-03-15 | End: 2024-03-20

## 2024-03-15 RX ORDER — VANCOMYCIN HCL 1 G
1000 VIAL (EA) INTRAVENOUS EVERY 12 HOURS
Refills: 0 | Status: DISCONTINUED | OUTPATIENT
Start: 2024-03-15 | End: 2024-03-18

## 2024-03-15 RX ORDER — IBUPROFEN 200 MG
1 TABLET ORAL
Qty: 20 | Refills: 0
Start: 2024-03-15 | End: 2024-03-19

## 2024-03-15 RX ORDER — METHOCARBAMOL 500 MG/1
2 TABLET, FILM COATED ORAL
Refills: 0
Start: 2024-03-15

## 2024-03-15 RX ORDER — FLUTICASONE FUROATE, UMECLIDINIUM BROMIDE AND VILANTEROL TRIFENATATE 200; 62.5; 25 UG/1; UG/1; UG/1
1 POWDER RESPIRATORY (INHALATION)
Qty: 0 | Refills: 0 | DISCHARGE

## 2024-03-15 RX ORDER — CLONAZEPAM 1 MG
0.25 TABLET ORAL
Refills: 0 | Status: DISCONTINUED | OUTPATIENT
Start: 2024-03-15 | End: 2024-03-20

## 2024-03-15 RX ORDER — PIPERACILLIN AND TAZOBACTAM 4; .5 G/20ML; G/20ML
3.38 INJECTION, POWDER, LYOPHILIZED, FOR SOLUTION INTRAVENOUS ONCE
Refills: 0 | Status: COMPLETED | OUTPATIENT
Start: 2024-03-15 | End: 2024-03-15

## 2024-03-15 RX ORDER — PIPERACILLIN AND TAZOBACTAM 4; .5 G/20ML; G/20ML
3.38 INJECTION, POWDER, LYOPHILIZED, FOR SOLUTION INTRAVENOUS EVERY 8 HOURS
Refills: 0 | Status: DISCONTINUED | OUTPATIENT
Start: 2024-03-15 | End: 2024-03-18

## 2024-03-15 RX ADMIN — PIPERACILLIN AND TAZOBACTAM 200 GRAM(S): 4; .5 INJECTION, POWDER, LYOPHILIZED, FOR SOLUTION INTRAVENOUS at 23:47

## 2024-03-15 RX ADMIN — SODIUM CHLORIDE 1800 MILLILITER(S): 9 INJECTION INTRAMUSCULAR; INTRAVENOUS; SUBCUTANEOUS at 22:07

## 2024-03-15 NOTE — ED ADULT NURSE NOTE - OBJECTIVE STATEMENT
Patient had brain surgery (tumor extraction) released Monday, fever started yesterday. Patient reports small sore inside of left cheek x 1 week. Patient reports left ear  hurts (incision near the ear.) Patient taking Tylenol for fever and pain. Incision clean and dry. Daughter reports transient confusion. Patient had brain surgery (tumor extraction) released Monday, fever started yesterday. Patient reports small sore inside of left cheek x 1 week. Patient reports left ear  hurts (incision near the ear.) Patient taking Tylenol for fever and pain. Incision clean and dry. Daughter reports transient confusion. Neurosurgeon Sidney Bowman.

## 2024-03-15 NOTE — H&P ADULT - NSHPLABSRESULTS_GEN_ALL_CORE
=======================================================  Labs:                        12.4   10.10 )-----------( 351      ( 15 Mar 2024 20:00 )             38.5     03-15    142  |  105  |  15  ----------------------------<  107<H>  3.8   |  29  |  0.55    Ca    9.6      15 Mar 2024 20:00    TPro  6.0  /  Alb  2.4<L>  /  TBili  0.5  /  DBili  x   /  AST  29  /  ALT  43  /  AlkPhos  134<H>  03-15      Culture - Urine (collected 02-17-23 @ 21:30)  Source: Clean Catch Clean Catch (Midstream)  Final Report (02-18-23 @ 23:35):    No growth    Culture - Blood (collected 02-17-23 @ 18:06)  Source: .Blood Blood-Peripheral  Final Report (02-23-23 @ 01:01):    No Growth Final    Culture - Blood (collected 02-17-23 @ 17:58)  Source: .Blood Blood-Peripheral  Final Report (02-23-23 @ 01:01):    No Growth Final      Creatinine: 0.55 mg/dL (03-15-24 @ 20:00)            WBC Count: 10.10 K/uL (03-15-24 @ 20:00)    SARS-CoV-2: NotDetec (03-15-24 @ 20:00)      Alkaline Phosphatase: 134 U/L (03-15-24 @ 20:00)  Alanine Aminotransferase (ALT/SGPT): 43 U/L (03-15-24 @ 20:00)  Aspartate Aminotransferase (AST/SGOT): 29 U/L (03-15-24 @ 20:00)  Bilirubin Total: 0.5 mg/dL (03-15-24 @ 20:00)      < from: CT Head No Cont (03.15.24 @ 23:41) >    IMPRESSION:  1.   Resection of a portion right cerebellum within overlying   cranioplasty.  There is an associated fluid collection in the soft tissues that measures   2.7 x  1.5 cm in size. This may be representative of a developing abscess.  2.   The remainder of the examination is unremarkable. There is no   hemorrhage  or mass.        ******PRELIMINARY REPORT******        < end of copied text >    < from: CT Neck Soft Tissue w/ IV Cont (03.15.24 @ 23:44) >      IMPRESSION:  1.   No significant inflammatory process within the neck.  2.   Severe emphysematous changes lungs.  3.   Right Marleny hilar calcified mass that measures 4.0 x 2.0 cm in size.        ******PRELIMINARY REPORT******      < end of copied text >

## 2024-03-15 NOTE — H&P ADULT - NSHPREVIEWOFSYSTEMS_GEN_ALL_CORE
REVIEW OF SYSTEMS:    CONSTITUTIONAL: No weakness, + fevers   EYES/ENT: No visual changes;  No vertigo or throat pain   NECK: No pain or stiffness  RESPIRATORY: No cough, wheezing, hemoptysis; No shortness of breath  CARDIOVASCULAR: No chest pain or palpitations  GASTROINTESTINAL: No abdominal or epigastric pain. No nausea, vomiting, or hematemesis; No diarrhea or constipation. No melena or hematochezia.  GENITOURINARY: No dysuria, frequency or hematuria  NEUROLOGICAL: No numbness or weakness  SKIN: No itching, rashes

## 2024-03-15 NOTE — ED PROVIDER NOTE - CLINICAL SUMMARY MEDICAL DECISION MAKING FREE TEXT BOX
78-year-old female with history of hypertension, small cell carcinoma of the lung, COPD, anxiety, paroxysmal A-fib on Xarelto, CML, right cerebellar mass, status post brain mass removal and 2/19/2024 at [facility] presents today accompanied with her daughter complaining of fever which started yesterday today per daughter patient had Tmax of 101 at home, they did call [facility] and were seen for their postop visit.  Patient's wound was evaluated told everything was normal and had blood test done.  They were told everything was normal and discharged home, patient continued to have fever and given Tylenol every 6 hours.  Today patient had Tmax of 102, patient started attempt to call the neurosurgeon again today, were told to go to an urgent care was sent to the ER for further workup.  On exam patient is awake alert and oriented x 3, well-appearing nontoxic non-lethargic appearing and able to follow commands without difficulty.  Patient does have surgical wound behind the right ear vertically placed which appears clean dry intact without any surrounding erythema or discharge or tenderness.  Respiratory exam is also benign except for skin which feels warm to touch.  Differential diagnosis includes not limited to viral illness versus bacteremia, septic workup ordered, IV fluids and antibiotics started.  Patient to be admitted for further workup

## 2024-03-15 NOTE — ED ADULT NURSE NOTE - NSFALLRISKINTERV_ED_ALL_ED

## 2024-03-15 NOTE — H&P ADULT - HISTORY OF PRESENT ILLNESS
78-year-old female with history of hypertension, small cell carcinoma of the lung, COPD, anxiety, paroxysmal A-fib on Xarelto, CML, right cerebellar mass, status post brain mass removal and 2/29/2024 at [facility] presents today accompanied with her daughter complaining of fever which started yesterday today per daughter patient had Tmax of 101 at home, they did call [facility] and were seen for their postop visit.  Patient's wound was evaluated told everything was normal and had blood test done.  They were told everything was normal and discharged home, patient continued to have fever and given Tylenol every 6 hours.  Today patient had Tmax of 102, patient started attempt to call the neurosurgeon again today, were told to go to an urgent care was sent to the ER for further workup.      Pt denies any specific symptoms. Says she is doing well besides the fever.

## 2024-03-15 NOTE — H&P ADULT - PROBLEM SELECTOR PLAN 1
- UA, Cxr negative  - Wound looks clean  - CT HEAD prelim report -fluid collection in the soft tissues that measures  2.7 x1.5 cm in size. This may be representative of a developing abscess.  - Neurosurgery consult / [place] [facility]   -  Bcx  - Broad spectrum abx Vanc + zosyn

## 2024-03-15 NOTE — ED ADULT TRIAGE NOTE - CHIEF COMPLAINT QUOTE
Patient presents to ED c/o generalized weakness, fever chills started today.  hx leukemia, CML. HTN, right lung cancer

## 2024-03-15 NOTE — ED PROVIDER NOTE - IV ALTEPLASE EXCL ABS HIDDEN
Reviewed BP readings. BP actively monitored, goal <130/80 mmHg  Continue monitoring      Last 5 Patient Entered Readings                                      Current 30 Day Average: 126/76     Recent Readings 8/28/2018 8/20/2018 8/13/2018 8/6/2018 7/30/2018    SBP (mmHg) 119 115 102 145 148    DBP (mmHg) 71 88 69 68 85    Pulse 85 88 86 80 92             show

## 2024-03-15 NOTE — ED PROVIDER NOTE - NSICDXPASTMEDICALHX_GEN_ALL_CORE_FT
1. \"Have you been to the ER, urgent care clinic since your last visit? Hospitalized since your last visit? \" No    2. \"Have you seen or consulted any other health care providers outside of the [address] [place] [address] since your last visit? \" No    3. For patients aged 43-73: Has the patient had a colonoscopy / FIT/ Cologuard? Recommendation: Colonoscopy every 10y or annual FIT test from 50-75 or every 3 year stool DNA based test with consideration of ongoing screening from 76-85. If the patient is female:    4. For patients aged 43-66: Has the patient had a mammogram within the past 2 years? No    5. For patients aged 21-65: Has the patient had a pap smear?  No
Findings: No rash. Neurological:      General: No focal deficit present. Mental Status: She is alert and oriented to person, place, and time. Mental status is at baseline. Gait: Gait abnormal.   Psychiatric:         Mood and Affect: Mood normal.         Behavior: Behavior normal.      Comments: Chronic depression/anxiety          Assessment & Plan   Diagnosis Orders   1. Essential hypertension  Monitor BP at home with goal of 140/90 or less   Stable chronic condition. Continue current treatment/medications. 2. Gastroesophageal reflux disease without esophagitis  Stable on current medication/regimen        3. Parkinson's syndrome ([address])  Continue Sinemet  Follow-up with neurologist as scheduled      4. Weakness generalized  Fall precautions discussed  Advised patient to increase activity as tolerated including getting up and walking for few minutes every hour or so      5. Chronic pain of right lower extremity  Continue pain medications as directed      6. Anxiety  Continue Zoloft and BuSpar      7. Moderate protein-calorie malnutrition (HCC)  Increase calorie intake especially protein in the diet          No orders of the defined types were placed in this encounter. Return in about 6 months (around 2/11/2024). All chronic medical problems are stable  Continue with current medical management and plan  lab results and schedule of future lab studies reviewed with patient  reviewed diet, exercise and weight control  reviewed medications and side effects in detail  F/u with other MD's/ providers as scheduled  COVID-19 precautions discussed with pt  An After Visit Summary was printed and given to the patient.       Melissa Naqvi DO
PAST MEDICAL HISTORY:  Atrial fibrillation     CML (chronic myelocytic leukemia)     COPD (chronic obstructive pulmonary disease)     COPD (chronic obstructive pulmonary disease)     Hypertension     Hypertension     Leukemia     Lung cancer     Solitary pulmonary nodule

## 2024-03-15 NOTE — H&P ADULT - ASSESSMENT
78-year-old female with history of hypertension, small cell carcinoma of the lung, COPD, anxiety, paroxysmal A-fib on Xarelto, CML, right cerebellar mass, status post brain mass removal and 2/29/2024 at [facility] presents today accompanied with her daughter complaining of fever. Admit for post op fever unknown source.

## 2024-03-15 NOTE — H&P ADULT - NSHPPHYSICALEXAM_GEN_ALL_CORE
VITALS:   T(C): 37.1 (03-16-24 @ 06:06), Max: 98.9 (03-15-24 @ 23:55)  HR: 71 (03-16-24 @ 06:06) (71 - 93)  BP: 136/62 (03-16-24 @ 06:06) (113/59 - 159/75)  RR: 18 (03-16-24 @ 06:06) (17 - 18)  SpO2: 95% (03-16-24 @ 06:06) (93% - 95%)    GENERAL: NAD, lying in bed comfortably  HEAD:  Atraumatic, Normocephalic, scar rt side behind ear clean dry no drainage or redness noted  EYES: EOMI, PERRLA, conjunctiva and sclera clear  ENT: Moist mucous membranes  NECK: Supple, No JVD  CHEST/LUNG:  No rales, rhonchi, wheezing, or rubs. Unlabored respirations  HEART: Regular rate and rhythm; No murmurs, rubs, or gallops  ABDOMEN: BSx4; Soft, nontender, nondistended  EXTREMITIES:  2+ Peripheral Pulses, brisk capillary refill. No clubbing, cyanosis, or edema  NERVOUS SYSTEM:  A&Ox3, no focal deficits   SKIN: No rashes or lesions

## 2024-03-15 NOTE — H&P ADULT - NSHPADDITIONALINFOADULT_GEN_ALL_CORE
paroxysmal afib -- eliquis held per pt for surgery and was not recommended to restart it yet.   - restart when appropriate. Will hold off for now if any planned procedure.

## 2024-03-16 DIAGNOSIS — I10 ESSENTIAL (PRIMARY) HYPERTENSION: ICD-10-CM

## 2024-03-16 DIAGNOSIS — R50.82 POSTPROCEDURAL FEVER: ICD-10-CM

## 2024-03-16 DIAGNOSIS — J44.9 CHRONIC OBSTRUCTIVE PULMONARY DISEASE, UNSPECIFIED: ICD-10-CM

## 2024-03-16 LAB
CRP SERPL-MCNC: 98 MG/L — HIGH
PROCALCITONIN SERPL-MCNC: 0.12 NG/ML — HIGH (ref 0.02–0.1)

## 2024-03-16 PROCEDURE — 93010 ELECTROCARDIOGRAM REPORT: CPT

## 2024-03-16 PROCEDURE — 99232 SBSQ HOSP IP/OBS MODERATE 35: CPT

## 2024-03-16 RX ORDER — POLYETHYLENE GLYCOL 3350 17 G/17G
17 POWDER, FOR SOLUTION ORAL DAILY
Refills: 0 | Status: DISCONTINUED | OUTPATIENT
Start: 2024-03-16 | End: 2024-03-20

## 2024-03-16 RX ORDER — AMLODIPINE BESYLATE 2.5 MG/1
5 TABLET ORAL DAILY
Refills: 0 | Status: DISCONTINUED | OUTPATIENT
Start: 2024-03-16 | End: 2024-03-17

## 2024-03-16 RX ORDER — ENOXAPARIN SODIUM 100 MG/ML
40 INJECTION SUBCUTANEOUS EVERY 24 HOURS
Refills: 0 | Status: DISCONTINUED | OUTPATIENT
Start: 2024-03-16 | End: 2024-03-20

## 2024-03-16 RX ADMIN — AMLODIPINE BESYLATE 5 MILLIGRAM(S): 2.5 TABLET ORAL at 05:55

## 2024-03-16 RX ADMIN — POLYETHYLENE GLYCOL 3350 17 GRAM(S): 17 POWDER, FOR SOLUTION ORAL at 05:55

## 2024-03-16 RX ADMIN — ENOXAPARIN SODIUM 40 MILLIGRAM(S): 100 INJECTION SUBCUTANEOUS at 17:40

## 2024-03-16 RX ADMIN — Medication 10 MILLIGRAM(S): at 17:37

## 2024-03-16 RX ADMIN — Medication 650 MILLIGRAM(S): at 02:19

## 2024-03-16 RX ADMIN — Medication 250 MILLIGRAM(S): at 12:25

## 2024-03-16 RX ADMIN — Medication 250 MILLIGRAM(S): at 00:16

## 2024-03-16 RX ADMIN — PIPERACILLIN AND TAZOBACTAM 25 GRAM(S): 4; .5 INJECTION, POWDER, LYOPHILIZED, FOR SOLUTION INTRAVENOUS at 21:39

## 2024-03-16 RX ADMIN — PIPERACILLIN AND TAZOBACTAM 25 GRAM(S): 4; .5 INJECTION, POWDER, LYOPHILIZED, FOR SOLUTION INTRAVENOUS at 14:38

## 2024-03-16 RX ADMIN — Medication 650 MILLIGRAM(S): at 03:15

## 2024-03-16 RX ADMIN — ATENOLOL 50 MILLIGRAM(S): 25 TABLET ORAL at 05:55

## 2024-03-16 RX ADMIN — VALSARTAN 160 MILLIGRAM(S): 80 TABLET ORAL at 05:56

## 2024-03-16 RX ADMIN — PIPERACILLIN AND TAZOBACTAM 25 GRAM(S): 4; .5 INJECTION, POWDER, LYOPHILIZED, FOR SOLUTION INTRAVENOUS at 06:00

## 2024-03-16 NOTE — PATIENT PROFILE ADULT - FALL HARM RISK - HARM RISK INTERVENTIONS
Assistance with ambulation/Assistance OOB with selected safe patient handling equipment/Discuss with provider need for PT consult/Monitor for mental status changes/Monitor gait and stability/Provide patient with walking aids - walker, cane, crutches/Reinforce activity limits and safety measures with patient and family/Tailored Fall Risk Interventions/Use of alarms - bed, chair and/or voice tab/Visual Cue: Yellow wristband and red socks/Bed in lowest position, wheels locked, appropriate side rails in place/Call bell, personal items and telephone in reach/Instruct patient to call for assistance before getting out of bed or chair/Non-slip footwear when patient is out of bed/[place] to call system/Physically safe environment - no spills, clutter or unnecessary equipment/Purposeful Proactive Rounding/Room/bathroom lighting operational, light cord in reach

## 2024-03-16 NOTE — PROGRESS NOTE ADULT - SUBJECTIVE AND OBJECTIVE BOX
Patient is a 78y old  Female who presents with a chief complaint of Febrile, s/p brain surgery. (15 Mar 2024 23:37)      OVERNIGHT EVENTS:    MEDICATIONS  (STANDING):  amLODIPine   Tablet 5 milliGRAM(s) Oral daily  atenolol  Tablet 50 milliGRAM(s) Oral daily  FLUoxetine 10 milliGRAM(s) Oral daily  piperacillin/tazobactam IVPB.. 3.375 Gram(s) IV Intermittent every 8 hours  polyethylene glycol 3350 17 Gram(s) Oral daily  valsartan 160 milliGRAM(s) Oral daily  vancomycin  IVPB 1000 milliGRAM(s) IV Intermittent every 12 hours    MEDICATIONS  (PRN):  acetaminophen     Tablet .. 650 milliGRAM(s) Oral every 6 hours PRN Temp greater or equal to 38C (100.4F), Mild Pain (1 - 3)  albuterol    90 MICROgram(s) HFA Inhaler 2 Puff(s) Inhalation every 6 hours PRN Shortness of Breath and/or Wheezing  aluminum hydroxide/magnesium hydroxide/simethicone Suspension 30 milliLiter(s) Oral every 4 hours PRN Dyspepsia  clonazePAM  Tablet 0.25 milliGRAM(s) Oral two times a day PRN anxiety  melatonin 3 milliGRAM(s) Oral at bedtime PRN Insomnia  ondansetron Injectable 4 milliGRAM(s) IV Push every 8 hours PRN Nausea and/or Vomiting      Allergies    No Known Allergies    Intolerances        SUBJECTIVE: in bed in NAD, no acute events overnight     T(F): 99.4 (24 @ 11:00), Max: 210 (03-15-24 @ 23:55)  HR: 96 (24 @ 11:00) (71 - 96)  BP: 134/72 (24 @ 11:00) (113/59 - 159/75)  RR: 19 (24 @ 11:00) (17 - 19)  SpO2: 93% (24 @ 11:00) (93% - 95%)  Wt(kg): --    PHYSICAL EXAM:  GENERAL: NAD, well-groomed, well-developed  HEAD:  post op surgical changes   EYES: EOMI, PERRLA, conjunctiva and sclera clear  ENMT: No tonsillar erythema, exudates, or enlargement; Moist mucous membranes, Good dentition, No lesions  NECK: Supple,   CHEST/LUNG: Clear to  auscultation bilaterally; No rales, rhonchi, wheezing, or rubs  bilaterally  HEART: Regular rate and rhythm; No murmurs, rubs, or gallops  ABDOMEN: Soft, Nontender, Nondistended; Bowel sounds present  EXTREMITIES:  2+ Peripheral Pulses, No clubbing, cyanosis, or edema BL LE  SKIN: No rashes or lesions  NERVOUS SYSTEM:  Alert & Oriented X3, Good concentration; Motor Strength 5/5 B/L upper and lower extremities;   DTRs 2+ intact and symmetric, sensation intact BL    LABS:                        12.4   10.10 )-----------( 351      ( 15 Mar 2024 20:00 )             38.5     03-15    142  |  105  |  15  ----------------------------<  107<H>  3.8   |  29  |  0.55    Ca    9.6      15 Mar 2024 20:00    TPro  6.0  /  Alb  2.4<L>  /  TBili  0.5  /  DBili  x   /  AST  29  /  ALT  43  /  AlkPhos  134<H>  03-15    PT/INR - ( 15 Mar 2024 20:00 )   PT: 10.4 sec;   INR: 0.86 ratio         PTT - ( 15 Mar 2024 20:00 )  PTT:28.8 sec  Urinalysis Basic - ( 15 Mar 2024 22:10 )    Color: Yellow / Appearance: Clear / S.020 / pH: x  Gluc: x / Ketone: Negative mg/dL  / Bili: Negative / Urobili: 1.0 mg/dL   Blood: x / Protein: Trace mg/dL / Nitrite: Negative   Leuk Esterase: Trace / RBC: 1 /HPF / WBC 4 /HPF   Sq Epi: x / Non Sq Epi: x / Bacteria: Few /HPF      Cultures;   CAPILLARY BLOOD GLUCOSE        Lipid panel:           RADIOLOGY & ADDITIONAL TESTS:      Imaging Personally Reviewed:  [ x] YES      Consultant(s) Notes Reviewed:  [x ] YES     Care Discussed with [x ] Consultants [X ] Patient [x ] Family  [x ]    [x ]  Other; RN

## 2024-03-16 NOTE — PROGRESS NOTE ADULT - ASSESSMENT
78-year-old female with history of hypertension, small cell carcinoma of the lung, COPD, anxiety, paroxysmal A-fib on Xarelto, CML, right cerebellar mass, status post brain mass removal and 2/29/2024 at [facility] presents today accompanied with her daughter complaining of fever. Admit for post op fever unknown source.

## 2024-03-16 NOTE — PROGRESS NOTE ADULT - PROBLEM SELECTOR PLAN 1
recent neurosurgery at [facility] [place] for right cerebellar mass in 2/29/202 present with fever    ct heard ? developing abscess-   continue with antbx   obtain mri  with and with out contrast    [place] [facility] [place]  f/u culture data

## 2024-03-17 LAB
ANION GAP SERPL CALC-SCNC: 6 MMOL/L — SIGNIFICANT CHANGE UP (ref 5–17)
BUN SERPL-MCNC: 8 MG/DL — SIGNIFICANT CHANGE UP (ref 7–23)
CALCIUM SERPL-MCNC: 8.9 MG/DL — SIGNIFICANT CHANGE UP (ref 8.5–10.1)
CHLORIDE SERPL-SCNC: 102 MMOL/L — SIGNIFICANT CHANGE UP (ref 96–108)
CO2 SERPL-SCNC: 29 MMOL/L — SIGNIFICANT CHANGE UP (ref 22–31)
CREAT SERPL-MCNC: 0.62 MG/DL — SIGNIFICANT CHANGE UP (ref 0.5–1.3)
CULTURE RESULTS: SIGNIFICANT CHANGE UP
EGFR: 92 ML/MIN/1.73M2 — SIGNIFICANT CHANGE UP
GLUCOSE SERPL-MCNC: 120 MG/DL — HIGH (ref 70–99)
HCT VFR BLD CALC: 41.7 % — SIGNIFICANT CHANGE UP (ref 34.5–45)
HGB BLD-MCNC: 13.3 G/DL — SIGNIFICANT CHANGE UP (ref 11.5–15.5)
MAGNESIUM SERPL-MCNC: 1.9 MG/DL — SIGNIFICANT CHANGE UP (ref 1.6–2.6)
MCHC RBC-ENTMCNC: 27.6 PG — SIGNIFICANT CHANGE UP (ref 27–34)
MCHC RBC-ENTMCNC: 31.9 G/DL — LOW (ref 32–36)
MCV RBC AUTO: 86.5 FL — SIGNIFICANT CHANGE UP (ref 80–100)
NRBC # BLD: 0 /100 WBCS — SIGNIFICANT CHANGE UP (ref 0–0)
PHOSPHATE SERPL-MCNC: 2.9 MG/DL — SIGNIFICANT CHANGE UP (ref 2.5–4.5)
PLATELET # BLD AUTO: 393 K/UL — SIGNIFICANT CHANGE UP (ref 150–400)
POTASSIUM SERPL-MCNC: 3.4 MMOL/L — LOW (ref 3.5–5.3)
POTASSIUM SERPL-SCNC: 3.4 MMOL/L — LOW (ref 3.5–5.3)
RBC # BLD: 4.82 M/UL — SIGNIFICANT CHANGE UP (ref 3.8–5.2)
RBC # FLD: 14.7 % — HIGH (ref 10.3–14.5)
SODIUM SERPL-SCNC: 137 MMOL/L — SIGNIFICANT CHANGE UP (ref 135–145)
SPECIMEN SOURCE: SIGNIFICANT CHANGE UP
VANCOMYCIN TROUGH SERPL-MCNC: 9 UG/ML — LOW (ref 10–20)
WBC # BLD: 9.75 K/UL — SIGNIFICANT CHANGE UP (ref 3.8–10.5)
WBC # FLD AUTO: 9.75 K/UL — SIGNIFICANT CHANGE UP (ref 3.8–10.5)

## 2024-03-17 PROCEDURE — 70553 MRI BRAIN STEM W/O & W/DYE: CPT | Mod: 26

## 2024-03-17 PROCEDURE — 99232 SBSQ HOSP IP/OBS MODERATE 35: CPT

## 2024-03-17 RX ORDER — AMLODIPINE BESYLATE 2.5 MG/1
10 TABLET ORAL DAILY
Refills: 0 | Status: DISCONTINUED | OUTPATIENT
Start: 2024-03-18 | End: 2024-03-20

## 2024-03-17 RX ORDER — FLUTICASONE FUROATE, UMECLIDINIUM BROMIDE AND VILANTEROL TRIFENATATE 200; 62.5; 25 UG/1; UG/1; UG/1
1 POWDER RESPIRATORY (INHALATION) DAILY
Refills: 0 | Status: DISCONTINUED | OUTPATIENT
Start: 2024-03-17 | End: 2024-03-20

## 2024-03-17 RX ORDER — POTASSIUM CHLORIDE 20 MEQ
40 PACKET (EA) ORAL ONCE
Refills: 0 | Status: COMPLETED | OUTPATIENT
Start: 2024-03-17 | End: 2024-03-17

## 2024-03-17 RX ADMIN — PIPERACILLIN AND TAZOBACTAM 25 GRAM(S): 4; .5 INJECTION, POWDER, LYOPHILIZED, FOR SOLUTION INTRAVENOUS at 21:36

## 2024-03-17 RX ADMIN — Medication 650 MILLIGRAM(S): at 18:12

## 2024-03-17 RX ADMIN — Medication 10 MILLIGRAM(S): at 11:35

## 2024-03-17 RX ADMIN — Medication 40 MILLIEQUIVALENT(S): at 11:40

## 2024-03-17 RX ADMIN — VALSARTAN 160 MILLIGRAM(S): 80 TABLET ORAL at 05:41

## 2024-03-17 RX ADMIN — Medication 250 MILLIGRAM(S): at 13:58

## 2024-03-17 RX ADMIN — Medication 650 MILLIGRAM(S): at 05:41

## 2024-03-17 RX ADMIN — ATENOLOL 50 MILLIGRAM(S): 25 TABLET ORAL at 05:41

## 2024-03-17 RX ADMIN — POLYETHYLENE GLYCOL 3350 17 GRAM(S): 17 POWDER, FOR SOLUTION ORAL at 11:35

## 2024-03-17 RX ADMIN — ENOXAPARIN SODIUM 40 MILLIGRAM(S): 100 INJECTION SUBCUTANEOUS at 17:36

## 2024-03-17 RX ADMIN — AMLODIPINE BESYLATE 5 MILLIGRAM(S): 2.5 TABLET ORAL at 05:41

## 2024-03-17 RX ADMIN — FLUTICASONE FUROATE, UMECLIDINIUM BROMIDE AND VILANTEROL TRIFENATATE 1 PUFF(S): 200; 62.5; 25 POWDER RESPIRATORY (INHALATION) at 13:55

## 2024-03-17 RX ADMIN — Medication 650 MILLIGRAM(S): at 06:41

## 2024-03-17 RX ADMIN — Medication 250 MILLIGRAM(S): at 01:15

## 2024-03-17 RX ADMIN — PIPERACILLIN AND TAZOBACTAM 25 GRAM(S): 4; .5 INJECTION, POWDER, LYOPHILIZED, FOR SOLUTION INTRAVENOUS at 13:55

## 2024-03-17 RX ADMIN — PIPERACILLIN AND TAZOBACTAM 25 GRAM(S): 4; .5 INJECTION, POWDER, LYOPHILIZED, FOR SOLUTION INTRAVENOUS at 05:41

## 2024-03-17 RX ADMIN — Medication 650 MILLIGRAM(S): at 18:44

## 2024-03-17 NOTE — PROGRESS NOTE ADULT - SUBJECTIVE AND OBJECTIVE BOX
Patient is a 78y old  Female who presents with a chief complaint of Febrile, s/p brain surgery. (15 Mar 2024 23:37)      OVERNIGHT EVENTS:      MEDICATIONS  (STANDING):  amLODIPine   Tablet 5 milliGRAM(s) Oral daily  atenolol  Tablet 50 milliGRAM(s) Oral daily  enoxaparin Injectable 40 milliGRAM(s) SubCutaneous every 24 hours  FLUoxetine 10 milliGRAM(s) Oral daily  piperacillin/tazobactam IVPB.. 3.375 Gram(s) IV Intermittent every 8 hours  polyethylene glycol 3350 17 Gram(s) Oral daily  valsartan 160 milliGRAM(s) Oral daily  vancomycin  IVPB 1000 milliGRAM(s) IV Intermittent every 12 hours    MEDICATIONS  (PRN):  acetaminophen     Tablet .. 650 milliGRAM(s) Oral every 6 hours PRN Temp greater or equal to 38C (100.4F), Mild Pain (1 - 3)  albuterol    90 MICROgram(s) HFA Inhaler 2 Puff(s) Inhalation every 6 hours PRN Shortness of Breath and/or Wheezing  aluminum hydroxide/magnesium hydroxide/simethicone Suspension 30 milliLiter(s) Oral every 4 hours PRN Dyspepsia  clonazePAM  Tablet 0.25 milliGRAM(s) Oral two times a day PRN anxiety  melatonin 3 milliGRAM(s) Oral at bedtime PRN Insomnia  ondansetron Injectable 4 milliGRAM(s) IV Push every 8 hours PRN Nausea and/or Vomiting    Allergies    No Known Allergies    Intolerances        SUBJECTIVE: in bed in NAD, no acute events overnight       Vital Signs Last 24 Hrs  T(C): 38 (17 Mar 2024 05:14), Max: 38 (17 Mar 2024 05:14)  T(F): 100.4 (17 Mar 2024 05:14), Max: 100.4 (17 Mar 2024 05:14)  HR: 90 (17 Mar 2024 05:14) (86 - 97)  BP: 146/69 (17 Mar 2024 05:14) (134/72 - 159/71)  BP(mean): --  RR: 18 (17 Mar 2024 05:14) (18 - 19)  SpO2: 94% (17 Mar 2024 05:14) (93% - 97%)    Parameters below as of 17 Mar 2024 05:14  Patient On (Oxygen Delivery Method): room air    PHYSICAL EXAM:  GENERAL: NAD, well-groomed, well-developed  HEAD:  post op surgical changes   EYES: EOMI, PERRLA, conjunctiva and sclera clear  ENMT: No tonsillar erythema, exudates, or enlargement; Moist mucous membranes, Good dentition, No lesions  NECK: Supple,   CHEST/LUNG: Clear to  auscultation bilaterally; No rales, rhonchi, wheezing, or rubs  bilaterally  HEART: Regular rate and rhythm; No murmurs, rubs, or gallops  ABDOMEN: Soft, Nontender, Nondistended; Bowel sounds present  EXTREMITIES:  2+ Peripheral Pulses, No clubbing, cyanosis, or edema BL LE  SKIN: No rashes or lesions  NERVOUS SYSTEM:  Alert & Oriented X3, Good concentration; Motor Strength 5/5 B/L upper and lower extremities;   DTRs 2+ intact and symmetric, sensation intact BL    LABS:                        12.4   10.10 )-----------( 351      ( 15 Mar 2024 20:00 )             38.5     03-15    142  |  105  |  15  ----------------------------<  107<H>  3.8   |  29  |  0.55    Ca    9.6      15 Mar 2024 20:00    TPro  6.0  /  Alb  2.4<L>  /  TBili  0.5  /  DBili  x   /  AST  29  /  ALT  43  /  AlkPhos  134<H>  03-15    PT/INR - ( 15 Mar 2024 20:00 )   PT: 10.4 sec;   INR: 0.86 ratio         PTT - ( 15 Mar 2024 20:00 )  PTT:28.8 sec  Urinalysis Basic - ( 15 Mar 2024 22:10 )    Color: Yellow / Appearance: Clear / S.020 / pH: x  Gluc: x / Ketone: Negative mg/dL  / Bili: Negative / Urobili: 1.0 mg/dL   Blood: x / Protein: Trace mg/dL / Nitrite: Negative   Leuk Esterase: Trace / RBC: 1 /HPF / WBC 4 /HPF   Sq Epi: x / Non Sq Epi: x / Bacteria: Few /HPF      Cultures;   CAPILLARY BLOOD GLUCOSE        Lipid panel:           RADIOLOGY & ADDITIONAL TESTS:      Imaging Personally Reviewed:  [ x] YES      Consultant(s) Notes Reviewed:  [x ] YES     Care Discussed with [x ] Consultants [X ] Patient [x ] Family  [x ]    [x ]  Other; RN

## 2024-03-17 NOTE — PROGRESS NOTE ADULT - PROBLEM SELECTOR PLAN 1
recent neuro[place] at [facility] [place] in 2/29/202 present with fever    ct heard ? developing abscess-   continue with antbx   obtain mri  with and with out contrast    attempt to contact [facility] [place] [facility]  f/u culture data  3/17/2024 - blood cx negative   urine cx negative   f/u vanco trough   f/u brain mri

## 2024-03-17 NOTE — CHART NOTE - NSCHARTNOTEFT_GEN_A_CORE
Vanco trough 9.  D/w pharmacist who recommends to maintain current dose of vanco and repeat trough in 24 hours.

## 2024-03-17 NOTE — PROGRESS NOTE ADULT - ASSESSMENT
78-year-old female with history of hypertension, small cell carcinoma of the lung, COPD, anxiety, paroxysmal A-fib on Xarelto, CML, right cerebellar mass, status post brain mass removal and 2/29/2024 at [facility] presents today accompanied with her daughter complaining of fever. Admit for post op fever unknown source.

## 2024-03-18 LAB
APPEARANCE UR: CLEAR — SIGNIFICANT CHANGE UP
BACTERIA # UR AUTO: ABNORMAL /HPF
BILIRUB UR-MCNC: NEGATIVE — SIGNIFICANT CHANGE UP
COLOR SPEC: SIGNIFICANT CHANGE UP
DIFF PNL FLD: NEGATIVE — SIGNIFICANT CHANGE UP
EPI CELLS # UR: PRESENT
GLUCOSE UR QL: NEGATIVE MG/DL — SIGNIFICANT CHANGE UP
KETONES UR-MCNC: NEGATIVE MG/DL — SIGNIFICANT CHANGE UP
LEUKOCYTE ESTERASE UR-ACNC: NEGATIVE — SIGNIFICANT CHANGE UP
NITRITE UR-MCNC: NEGATIVE — SIGNIFICANT CHANGE UP
PH UR: 5.5 — SIGNIFICANT CHANGE UP (ref 5–8)
PROT UR-MCNC: 30 MG/DL
RAPID RVP RESULT: SIGNIFICANT CHANGE UP
RBC CASTS # UR COMP ASSIST: 0 /HPF — SIGNIFICANT CHANGE UP (ref 0–4)
SARS-COV-2 RNA SPEC QL NAA+PROBE: SIGNIFICANT CHANGE UP
SP GR SPEC: 1.03 — SIGNIFICANT CHANGE UP (ref 1–1.03)
UROBILINOGEN FLD QL: 1 MG/DL — SIGNIFICANT CHANGE UP (ref 0.2–1)
VANCOMYCIN TROUGH SERPL-MCNC: 11.6 UG/ML — SIGNIFICANT CHANGE UP (ref 10–20)
WBC UR QL: 2 /HPF — SIGNIFICANT CHANGE UP (ref 0–5)

## 2024-03-18 PROCEDURE — 99222 1ST HOSP IP/OBS MODERATE 55: CPT

## 2024-03-18 PROCEDURE — 93970 EXTREMITY STUDY: CPT | Mod: 26

## 2024-03-18 PROCEDURE — 99232 SBSQ HOSP IP/OBS MODERATE 35: CPT

## 2024-03-18 RX ADMIN — VALSARTAN 160 MILLIGRAM(S): 80 TABLET ORAL at 06:31

## 2024-03-18 RX ADMIN — ENOXAPARIN SODIUM 40 MILLIGRAM(S): 100 INJECTION SUBCUTANEOUS at 17:29

## 2024-03-18 RX ADMIN — PIPERACILLIN AND TAZOBACTAM 25 GRAM(S): 4; .5 INJECTION, POWDER, LYOPHILIZED, FOR SOLUTION INTRAVENOUS at 06:31

## 2024-03-18 RX ADMIN — Medication 10 MILLIGRAM(S): at 14:05

## 2024-03-18 RX ADMIN — AMLODIPINE BESYLATE 10 MILLIGRAM(S): 2.5 TABLET ORAL at 06:31

## 2024-03-18 RX ADMIN — FLUTICASONE FUROATE, UMECLIDINIUM BROMIDE AND VILANTEROL TRIFENATATE 1 PUFF(S): 200; 62.5; 25 POWDER RESPIRATORY (INHALATION) at 14:06

## 2024-03-18 RX ADMIN — Medication 250 MILLIGRAM(S): at 14:04

## 2024-03-18 RX ADMIN — ATENOLOL 50 MILLIGRAM(S): 25 TABLET ORAL at 06:31

## 2024-03-18 RX ADMIN — Medication 250 MILLIGRAM(S): at 01:42

## 2024-03-18 RX ADMIN — POLYETHYLENE GLYCOL 3350 17 GRAM(S): 17 POWDER, FOR SOLUTION ORAL at 14:05

## 2024-03-18 NOTE — CONSULT NOTE ADULT - SUBJECTIVE AND OBJECTIVE BOX
HPI:  78-year-old female with history of hypertension, small cell carcinoma of the lung, COPD, anxiety, paroxysmal A-fib on Xarelto, CML, right cerebellar mass, status post brain mass removal and 2/29/2024 at [facility] presents today accompanied with her daughter complaining of fever which started yesterday today per daughter patient had Tmax of 101 at home, they did call [facility] and were seen for their postop visit.  Patient's wound was evaluated told everything was normal and had blood test done.  They were told everything was normal and discharged home, patient continued to have fever and given Tylenol every 6 hours.  Today patient had Tmax of 102, patient started attempt to call the neurosurgeon again today, were told to go to an urgent care was sent to the ER for further workup.      Pt denies any specific symptoms. Says she is doing well besides the fever.  (15 Mar 2024 23:37)      PAST MEDICAL & SURGICAL HISTORY:  CML (chronic myelocytic leukemia)      COPD (chronic obstructive pulmonary disease)      Hypertension      Solitary pulmonary nodule      Lung cancer      Atrial fibrillation      Hypertension      Leukemia      COPD (chronic obstructive pulmonary disease)      History of hysterectomy      Pilonidal cyst          Allergies    No Known Allergies    Intolerances        ANTIMICROBIALS:  piperacillin/tazobactam IVPB.. 3.375 every 8 hours  vancomycin  IVPB 1000 every 12 hours      OTHER MEDS:  acetaminophen     Tablet .. 650 milliGRAM(s) Oral every 6 hours PRN  albuterol    90 MICROgram(s) HFA Inhaler 2 Puff(s) Inhalation every 6 hours PRN  aluminum hydroxide/magnesium hydroxide/simethicone Suspension 30 milliLiter(s) Oral every 4 hours PRN  amLODIPine   Tablet 10 milliGRAM(s) Oral daily  atenolol  Tablet 50 milliGRAM(s) Oral daily  clonazePAM  Tablet 0.25 milliGRAM(s) Oral two times a day PRN  enoxaparin Injectable 40 milliGRAM(s) SubCutaneous every 24 hours  FLUoxetine 10 milliGRAM(s) Oral daily  fluticasone furoate/umeclidinium/vilanterol 100-62.5-25 MICROgram(s) Inhaler 1 Puff(s) Inhalation daily  melatonin 3 milliGRAM(s) Oral at bedtime PRN  ondansetron Injectable 4 milliGRAM(s) IV Push every 8 hours PRN  polyethylene glycol 3350 17 Gram(s) Oral daily  valsartan 160 milliGRAM(s) Oral daily      SOCIAL HISTORY:    Marital Status:    Occupation:   Lives with:     Substance Use (street drugs):   Tobacco Usage:    Alcohol Usage: Social EtOH    FAMILY HISTORY:  FH: Lucia Gehrig's disease (Father)        ROS:  Unobtainable because:   All other systems negative     Constitutional: no fever, no chills, no weight loss, no night sweats  Eye: no eye pain, no redness, no vision changes  ENT:  no sore throat, no rhinorrhea  Cardiovascular:  no chest pain, no palpitation  Respiratory:  no SOB, no cough  GI:  no abd pain, no vomiting, no diarrhea  urinary: no dysuria, no hematuria, no flank pain  : no  discharge or bleeding  musculoskeletal:  no joint pain, no joint swelling  skin:  no rash  neurology:  no headache, no seizure, no change in mental status  psych: no anxiety, no depression     Physical Exam:    General:    NAD, non toxic  Head: atraumatic, normocephalic  Eyes: normal sclera and conjunctiva  ENT:   no oropharyngeal lesions, no LAD, neck supple  Cardio:    regular S1,S2, no murmur  Respiratory:   clear to auscultation b/l, no wheezing  abd:   soft, BS +, not tender, no hepatosplenomegaly  :     no CVAT, no suprapubic tenderness, no rashid  Musculoskeletal : no joint swelling, no edema  Skin:    no rash  vascular:  normal pulses  Neurologic:     no focal deficits  psych: normal affect, no suicidal ideation      Drug Dosing Weight  Height (cm): 160 (16 Mar 2024 01:37)  Weight (kg): 60.4 (16 Mar 2024 01:37)  BMI (kg/m2): 23.6 (16 Mar 2024 01:37)  BSA (m2): 1.63 (16 Mar 2024 01:37)    Vital Signs Last 24 Hrs  T(F): 97.8 (03-18-24 @ 05:52), Max: 210 (03-15-24 @ 23:55)    Vital Signs Last 24 Hrs  HR: 84 (03-18-24 @ 05:52) (77 - 91)  BP: 161/72 (03-18-24 @ 05:52) (146/78 - 161/72)  RR: 18 (03-18-24 @ 05:52)  SpO2: 93% (03-18-24 @ 05:52) (92% - 95%)  Wt(kg): --                          13.3   9.75  )-----------( 393      ( 17 Mar 2024 08:05 )             41.7       03-17    137  |  102  |  8   ----------------------------<  120<H>  3.4<L>   |  29  |  0.62    Ca    8.9      17 Mar 2024 08:05  Phos  2.9     03-17  Mg     1.9     03-17        Urinalysis Basic - ( 17 Mar 2024 08:05 )    Color: x / Appearance: x / SG: x / pH: x  Gluc: 120 mg/dL / Ketone: x  / Bili: x / Urobili: x   Blood: x / Protein: x / Nitrite: x   Leuk Esterase: x / RBC: x / WBC x   Sq Epi: x / Non Sq Epi: x / Bacteria: x        MICROBIOLOGY:  Vancomycin Level, Trough: 9.0 ug/mL (03-17-24 @ 12:06)  v  Clean Catch Clean Catch (Midstream)  03-15-24   <10,000 CFU/mL Normal Urogenital Cristina  --  --      .Blood Blood-Peripheral  03-15-24   No growth at 48 Hours  --  --          Rapid RVP Result: NotDetec (03-15 @ 20:00)          RADIOLOGY:       HPI:  78-year-old female with history of hypertension, small cell carcinoma of the lung, COPD, anxiety, paroxysmal A-fib on Xarelto, CML, right cerebellar mass, status post brain mass removal and 2/29/2024 at [facility] presented on 3/15 accompanied with her daughter complaining of fever which started couple days prior to admission  had Tmax of 101 at home, they did call [facility] and were seen for their postop visit.  Patient's wound was evaluated told everything was normal and had blood test done.  They were told everything was normal and discharged home, patient continued to have fever and given Tylenol every 6 hours.  3/15/2024 patient had Tmax of 102, patient started attempt to call the neurosurgeon again today, were told to go to an urgent care was sent to the ER for further workup.      Id consult is called today because she was supposed to be d/c today and follow up with her [facility] doctors but she ahd fever again of 101.7 last night and 101.2 this morning.  patient denies any complaints and states she feels fine.  she denies any HA, denies any neck pain, denies any pian or discharge around her recent head surgical site, denies any cough or sob, denies any nausea or vomiting, denies any dysurea, denies any diarrhea, denies any abd. pain.        PAST MEDICAL & SURGICAL HISTORY:  CML (chronic myelocytic leukemia)      COPD (chronic obstructive pulmonary disease)      Hypertension      Solitary pulmonary nodule      Lung cancer      Atrial fibrillation      Hypertension      Leukemia      COPD (chronic obstructive pulmonary disease)      History of hysterectomy      Pilonidal cyst          Allergies    No Known Drug Allergies        ANTIMICROBIALS:  piperacillin/tazobactam IVPB.. 3.375 every 8 hours  vancomycin  IVPB 1000 every 12 hours      OTHER MEDS:  acetaminophen     Tablet .. 650 milliGRAM(s) Oral every 6 hours PRN  albuterol    90 MICROgram(s) HFA Inhaler 2 Puff(s) Inhalation every 6 hours PRN  aluminum hydroxide/magnesium hydroxide/simethicone Suspension 30 milliLiter(s) Oral every 4 hours PRN  amLODIPine   Tablet 10 milliGRAM(s) Oral daily  atenolol  Tablet 50 milliGRAM(s) Oral daily  clonazePAM  Tablet 0.25 milliGRAM(s) Oral two times a day PRN  enoxaparin Injectable 40 milliGRAM(s) SubCutaneous every 24 hours  FLUoxetine 10 milliGRAM(s) Oral daily  fluticasone furoate/umeclidinium/vilanterol 100-62.5-25 MICROgram(s) Inhaler 1 Puff(s) Inhalation daily  melatonin 3 milliGRAM(s) Oral at bedtime PRN  ondansetron Injectable 4 milliGRAM(s) IV Push every 8 hours PRN  polyethylene glycol 3350 17 Gram(s) Oral daily  valsartan 160 milliGRAM(s) Oral daily      SOCIAL HISTORY:      Tobacco Usage:  denies used to smoke many years ago  Alcohol Usage: Social EtOH    FAMILY HISTORY:  FH: Lucia Gehrig's disease (Father)        ROS:      Constitutional: + fever, no chills, no weight loss, no night sweats  Eye: no eye pain, no redness, no vision changes  ENT:  no sore throat, no rhinorrhea  Cardiovascular:  no chest pain, no palpitation  Respiratory:  no SOB, no cough  GI:  no abd pain, no vomiting, no diarrhea  urinary: no dysuria, no hematuria, no flank pain  : no  discharge or bleeding  musculoskeletal:  no joint pain, no joint swelling  skin:  no rash  neurology:  no headache    Physical Exam:    General:    NAD, non toxic  Head: atraumatic, normocephalic  Eyes: normal sclera and conjunctiva  ENT:   no oropharyngeal lesions, no LAD, neck supple, FROM  posterior right occipital region surgical site clean/dry/no erythema  Cardio:    regular S1,S2, no murmur  Respiratory:   clear to auscultation b/l, no wheezing  abd:   soft, BS +, not tender, no distention  :     no CVAT, no suprapubic tenderness, no rashid  Musculoskeletal : no joint swelling, no edema  Skin:    no rash  vascular:  normal pulses  Neurologic:     no focal deficits, AAO x 3  psych: normal affect      Drug Dosing Weight  Height (cm): 160 (16 Mar 2024 01:37)  Weight (kg): 60.4 (16 Mar 2024 01:37)  BMI (kg/m2): 23.6 (16 Mar 2024 01:37)  BSA (m2): 1.63 (16 Mar 2024 01:37)    Vital Signs Last 24 Hrs  T(F): 97.8 (03-18-24 @ 05:52), Max: 210 (03-15-24 @ 23:55)    Vital Signs Last 24 Hrs  HR: 84 (03-18-24 @ 05:52) (77 - 91)  BP: 161/72 (03-18-24 @ 05:52) (146/78 - 161/72)  RR: 18 (03-18-24 @ 05:52)  SpO2: 93% (03-18-24 @ 05:52) (92% - 95%)  Wt(kg): --                          13.3   9.75  )-----------( 393      ( 17 Mar 2024 08:05 )             41.7       03-17    137  |  102  |  8   ----------------------------<  120<H>  3.4<L>   |  29  |  0.62    Ca    8.9      17 Mar 2024 08:05  Phos  2.9     03-17  Mg     1.9     03-17        Urinalysis Basic - ( 17 Mar 2024 08:05 )    Color: x / Appearance: x / SG: x / pH: x  Gluc: 120 mg/dL / Ketone: x  / Bili: x / Urobili: x   Blood: x / Protein: x / Nitrite: x   Leuk Esterase: x / RBC: x / WBC x   Sq Epi: x / Non Sq Epi: x / Bacteria: x        MICROBIOLOGY:  Vancomycin Level, Trough: 9.0 ug/mL (03-17-24 @ 12:06)    Clean Catch Clean Catch (Midstream)  03-15-24   <10,000 CFU/mL Normal Urogenital Cristina  --  --      .Blood Blood-Peripheral  03-15-24   No growth at 48 Hours  --  --          Rapid RVP Result: NotDetec (03-15 @ 20:00)      RADIOLOGY:  < from: Xray Chest 1 View- PORTABLE-Urgent (Xray Chest 1 View- PORTABLE-Urgent .) (03.15.24 @ 22:05) >    ACC: [id number] EXAM:  XR CHEST PORTABLE URGENT 1V   ORDERED BY: FRANCO INGRAM     PROCEDURE DATE:  03/15/2024          INTERPRETATION:  Chest portable semierect    CLINICAL HISTORY: Fever/infiltrate, postop    Comparison:  2/17/2023    IMPRESSION: Normal sized heart again noted. Right perihilar mass is again   seen with adjacent surgical anastomotic staple line now apparent. Lungs   again reveal diffuse coarsened lung markings. Patchy left basilar   infiltrate/atelectasis is now seen. Angles sharp. Bones without acute   abnormality.    Recommend interval follow-up radiographs.    ED notified via electronic communication at 10:36 AM on 3/16/2024    --- End of Report ---            ARELIS PATE MD; Attending Radiologist  This document has been electronically signed. Mar 16 2024 10:36AM    < end of copied text >    < from: CT Neck Soft Tissue w/ IV Cont (03.15.24 @ 23:44) >  Neck CT:    A 3 cm nonspecific extracranial fluid collection is noted adjacent to the   right suboccipital craniotomy with thin peripheral enhancement.   Differential considerations include a pseudomeningocele, evolving   postoperative seroma-hematoma, or abscess (given the clinical history of   fevers). Consider follow-up brain MRI with and without contrast for   further workup if clinically warranted.    No neck mass or lymphadenopathy.    No radiopaque salivary calculi are visualized.    A large right perihilar lung mass is again noted, as demonstrated on the   outside chest CT from 11/22/2023, not fully evaluated with neck CT   technique. Serial chest CT imaging follow-up over time is recommended to   monitor for stability.    --- End of Report ---            JENNIFER GUTIERREZ MD; Attending Radiologist  This document has been electronically signed. Mar 16 2024  8:31AM    < end of copied text >  < from: MR Head w/wo IV Cont (03.17.24 @ 10:53) >    ACC: [id number] EXAM:  MR BRAIN WAW IC   ORDERED BY: ROXANN LAW     PROCEDURE DATE:  03/17/2024          INTERPRETATION:  Clinical indication: Small cell cancer. Fever.    MRI of the brain was performed sagittal T1 axial T1 and T2 T2 FLAIR   diffusion and gradient echo sequence. The patient was injected proxy 6.5   cc gadavist IV with 3.5 cc contrast discarded. Sagittal coronal and axial   T1-weighted sequence was performed    This exam is compared with prior head CT performed on March 15, 2024.    Postoperative changes compatible with a right suboccipital craniectomy   meshlike cranioplasty is identified. There is evidence of T1 shortening   identified in the posterior region which could be compatible areas of   hemorrhage and or posterior material. There is also adjacent areas of   enhancement seen in this region (9-41) this could be compatible with   postop changes though possibly of residual tumor cannot be entirely   excluded. Clinical correlation and continued close interval follow-up is   recommended.    Extra axial collection in the postop region is identified which likely   related to postop changes. This finding measures approximate 1 cm in   widest diameter. Small extradural collection is seen in the postop region   which measures approximately 2.8 mm in widest diameter. This could be   compatible with a tiny pseudomeningocele. Adjacent area of edema seen as   well.    Parenchymal volume loss and chronic microvessel ischemic changes are   identified    Evaluation ofthe diffusion weighted sequence demonstrates no abnormal   areas of restricted diffusion to suggest acute infarct.    The large vessels demonstrate normal flow voids    The visualized paranasal sinuses mastoid and middle ear regions appear   clear.    IMPRESSION: Postop changes as described above.    --- End of Report ---            SHANIA NAVARRETE MD; Attending Radiologist  This document has been electronically signed. Mar 17 2024 12:53PM    < end of copied text >

## 2024-03-18 NOTE — PROGRESS NOTE ADULT - SUBJECTIVE AND OBJECTIVE BOX
Patient is a 78y old  Female who presents with a chief complaint of Febrile, s/p brain surgery. (15 Mar 2024 23:37)      OVERNIGHT EVENTS:  fever    MEDICATIONS  (STANDING):  amLODIPine   Tablet 10 milliGRAM(s) Oral daily  atenolol  Tablet 50 milliGRAM(s) Oral daily  enoxaparin Injectable 40 milliGRAM(s) SubCutaneous every 24 hours  FLUoxetine 10 milliGRAM(s) Oral daily  fluticasone furoate/umeclidinium/vilanterol 100-62.5-25 MICROgram(s) Inhaler 1 Puff(s) Inhalation daily  piperacillin/tazobactam IVPB.. 3.375 Gram(s) IV Intermittent every 8 hours  polyethylene glycol 3350 17 Gram(s) Oral daily  valsartan 160 milliGRAM(s) Oral daily  vancomycin  IVPB 1000 milliGRAM(s) IV Intermittent every 12 hours    MEDICATIONS  (PRN):  acetaminophen     Tablet .. 650 milliGRAM(s) Oral every 6 hours PRN Temp greater or equal to 38C (100.4F), Mild Pain (1 - 3)  albuterol    90 MICROgram(s) HFA Inhaler 2 Puff(s) Inhalation every 6 hours PRN Shortness of Breath and/or Wheezing  aluminum hydroxide/magnesium hydroxide/simethicone Suspension 30 milliLiter(s) Oral every 4 hours PRN Dyspepsia  clonazePAM  Tablet 0.25 milliGRAM(s) Oral two times a day PRN anxiety  melatonin 3 milliGRAM(s) Oral at bedtime PRN Insomnia  ondansetron Injectable 4 milliGRAM(s) IV Push every 8 hours PRN Nausea and/or Vomiting  Allergies    No Known Allergies    Intolerances        SUBJECTIVE: in bed in NAD, no acute events overnight   Vital Signs Last 24 Hrs  T(C): 36.6 (18 Mar 2024 05:52), Max: 38.8 (17 Mar 2024 17:16)  T(F): 97.8 (18 Mar 2024 05:52), Max: 101.8 (17 Mar 2024 17:16)  HR: 84 (18 Mar 2024 05:52) (79 - 91)  BP: 161/72 (18 Mar 2024 05:52) (147/79 - 161/72)  BP(mean): --  RR: 18 (18 Mar 2024 05:52) (18 - 18)  SpO2: 93% (18 Mar 2024 05:52) (92% - 93%)    Parameters below as of 17 Mar 2024 17:16  Patient On (Oxygen Delivery Method): room air        PHYSICAL EXAM:  GENERAL: NAD, well-groomed, well-developed  HEAD:  post op surgical changes   EYES: EOMI, PERRLA, conjunctiva and sclera clear  ENMT: No tonsillar erythema, exudates, or enlargement; Moist mucous membranes, Good dentition, No lesions  NECK: Supple,   CHEST/LUNG: Clear to  auscultation bilaterally; No rales, rhonchi, wheezing, or rubs  bilaterally  HEART: Regular rate and rhythm; No murmurs, rubs, or gallops  ABDOMEN: Soft, Nontender, Nondistended; Bowel sounds present  EXTREMITIES:  2+ Peripheral Pulses, No clubbing, cyanosis, or edema BL LE  SKIN: No rashes or lesions  NERVOUS SYSTEM:  Alert & Oriented X3, Good concentration; Motor Strength 5/5 B/L upper and lower extremities;   DTRs 2+ intact and symmetric, sensation intact BL    LABS:                                   13.3   9.75  )-----------( 393      ( 17 Mar 2024 08:05 )             41.7     03-17    137  |  102  |  8   ----------------------------<  120<H>  3.4<L>   |  29  |  0.62    Ca    8.9      17 Mar 2024 08:05  Phos  2.9     03-17  Mg     1.9     03-17       PTT - ( 15 Mar 2024 20:00 )  PTT:28.8 sec  Urinalysis Basic - ( 15 Mar 2024 22:10 )    Color: Yellow / Appearance: Clear / S.020 / pH: x  Gluc: x / Ketone: Negative mg/dL  / Bili: Negative / Urobili: 1.0 mg/dL   Blood: x / Protein: Trace mg/dL / Nitrite: Negative   Leuk Esterase: Trace / RBC: 1 /HPF / WBC 4 /HPF   Sq Epi: x / Non Sq Epi: x / Bacteria: Few /HPF      Cultures;   CAPILLARY BLOOD GLUCOSE        Lipid panel:           RADIOLOGY & ADDITIONAL TESTS:  < from: MR Head w/wo IV Cont (24 @ 10:53) >    IMPRESSION: Postop changes as described above.    < end of copied text >      Imaging Personally Reviewed:  [ x] YES      Consultant(s) Notes Reviewed:  [x ] YES     Care Discussed with [x ] Consultants [X ] Patient [x ] Family  [x ]    [x ]  Other; RN

## 2024-03-18 NOTE — PROGRESS NOTE ADULT - PROBLEM SELECTOR PLAN 1
recent neurosurgery at [facility] for right cerebellar mass in 2/29/202 present with fever    ct heard ? developing abscess-   continue with antbx   obtain mri  with and with out contrast    [place] [facility]  f/u culture data  3/17/2024 - blood cx negative   urine cx negative   f/u vanco trough   f/u brain mri  3/18/18343 brain mri as above, had a fever overnight   consulting ID team case d/w Id. [place] [facility] [place] present with fever    ct heard ? developing abscess-   continue with antbx   obtain mri  with and with out contrast    attempt to contact [facility]  f/u culture data  3/17/2024 - blood cx negative   urine cx negative   f/u vanco trough   f/u brain mri  3/18/85990 brain mri as above, had a fever overnight   consulting ID team case d/w Id.   get rvp blood cx and dooplers

## 2024-03-18 NOTE — PHARMACOTHERAPY INTERVENTION NOTE - COMMENTS
Pt on vancomycin 1000mg q12h with two serum concentrations and calculated AUC of 506. Therapeutic AUC range is 400-600. Recommend to continue current dose and monitor SCr. If renal function changes, would repeat a trough before the next dose. If renal function remains stable, can monitor troughs once weekly. 
As per policy vancomycin level ordered. Pt on vancomycin 1000mg q12h with calculated AUC of 488. Therapeutic AUC range is 400-600. Will continue current dose and check a second trough before this afternoon's dose.

## 2024-03-18 NOTE — CONSULT NOTE ADULT - ASSESSMENT
A/P-  78-year-old female with history of hypertension, small cell carcinoma of the lung, COPD, anxiety, paroxysmal A-fib on Xarelto, CML, right cerebellar mass, status post brain mass removal and 2/29/2024 at [facility] presented on 3/15 accompanied with her daughter complaining of fever which started couple days prior to admission  had Tmax of 101 at home, they did call [facility] and were seen for their postop visit.  Patient's wound was evaluated told everything was normal and had blood test done.  They were told everything was normal and discharged home, patient continued to have fever and given Tylenol every 6 hours.  3/15/2024 patient had Tmax of 102, patient started attempt to call the neurosurgeon again today, were told to go to an urgent care was sent to the ER for further workup.      Id consult is called today because she was supposed to be d/c today and follow up with her [facility] doctors but she ahd fever again of 101.7 last night and 101.2 this morning.  patient denies any complaints and states she feels fine.    she denies any HA, denies any neck pain, denies any pian or discharge around her recent head surgical site, denies any cough or sob, denies any nausea or vomiting, denies any dysurea, denies any diarrhea, denies any abd. pain.    t-max-101.8  No leukocytosis  UA- negative  blood cx from admission 3/15- NGTD x 2    MRI of the neck reported as -  ostoperative changes compatible with a right suboccipital craniectomy   meshlike cranioplasty is identified. There is evidence of T1 shortening   identified in the posterior region which could be compatible areas of   hemorrhage and or posterior material. There is also adjacent areas of   enhancement seen in this region (9-41) this could be compatible with   postop changes though possibly of residual tumor cannot be entirely   excluded. Clinical correlation and continued close interval follow-up is   recommended.  Extra axial collection in the postop region is identified which likely   related to postop changes. This finding measures approximate 1 cm in   widest diameter. Small extradural collection is seen in the postop region   which measures approximately 2.8 mm in widest diameter. This could be   compatible with a tiny pseudomeningocele. Adjacent area of edema seen as   well.    plan-  advise to check RVP now  advise to check 2 blood cx today.  MRI of the neck as per report most likely post-op changes and or ? small hematoma around occipital surgical site    hold off on any antibiotics pending above results.    also would advise for medicine team to contact patient's neurosurgeon at [facility] to discuss above MRI findings and seeking their advise as well in light of her recent surgery.    also advise to check LE dopplers r/o DVT As part of fever work up.    All labs and imaging and chart notes reviewed.     All above discussed with patient and patient verbalizes full understanding of all above and agrees with above plan of care.    Thank you for this consultation.    d/w  the hospitalist as well.    Glen Ruiz MD  Infectious Disease Attending    for any questions please do not hesitate to contact me either via teams or by calling [phone]             A/P-  78-year-old female with history of hypertension, small cell carcinoma of the lung, COPD, anxiety, paroxysmal A-fib on Xarelto, CML, right cerebellar mass, status post brain mass removal and 2/29/2024 at [facility] presented on 3/15 accompanied with her daughter complaining of fever which started couple days prior to admission  had Tmax of 101 at home, they did call [facility] and were seen for their postop visit.  Patient's wound was evaluated told everything was normal and had blood test done.  They were told everything was normal and discharged home, patient continued to have fever and given Tylenol every 6 hours.  3/15/2024 patient had Tmax of 102, patient started attempt to call the neurosurgeon again today, were told to go to an urgent care was sent to the ER for further workup.      Id consult is called today because she was supposed to be d/c today and follow up with her [facility] doctors but she ahd fever again of 101.7 last night and 101.2 this morning.  patient denies any complaints and states she feels fine.    she denies any HA, denies any neck pain, denies any pian or discharge around her recent head surgical site, denies any cough or sob, denies any nausea or vomiting, denies any dysurea, denies any diarrhea, denies any abd. pain.    t-max-101.8  No leukocytosis  UA- negative  blood cx from admission 3/15- NGTD x 2    MRI of the neck reported as -  ostoperative changes compatible with a right suboccipital craniectomy   meshlike cranioplasty is identified. There is evidence of T1 shortening   identified in the posterior region which could be compatible areas of   hemorrhage and or posterior material. There is also adjacent areas of   enhancement seen in this region (9-41) this could be compatible with   postop changes though possibly of residual tumor cannot be entirely   excluded. Clinical correlation and continued close interval follow-up is   recommended.  Extra axial collection in the postop region is identified which likely   related to postop changes. This finding measures approximate 1 cm in   widest diameter. Small extradural collection is seen in the postop region   which measures approximately 2.8 mm in widest diameter. This could be   compatible with a tiny pseudomeningocele. Adjacent area of edema seen as   well.    of note- patient has been on empiric vanco and zosyn since 3/15.    plan-  advise to check RVP now  advise to check 2 blood cx today.  MRI of the neck as per report most likely post-op changes and or ? small hematoma around occipital surgical site    hold off on any antibiotics pending above results.    also would advise for medicine team to contact patient's neurosurgeon at [facility] to discuss above MRI findings and seeking their advise as well in light of her recent surgery.    also advise to check LE dopplers r/o DVT As part of fever work up.    All labs and imaging and chart notes reviewed.     All above discussed with patient and patient verbalizes full understanding of all above and agrees with above plan of care.    Thank you for this consultation.    d/w  the hospitalist as well.    Glen Ruiz MD  Infectious Disease Attending    for any questions please do not hesitate to contact me either via teams or by calling [phone]

## 2024-03-18 NOTE — PROGRESS NOTE ADULT - ASSESSMENT
78-year-old female with history of hypertension, small cell carcinoma of the lung, COPD, anxiety, paroxysmal A-fib on Xarelto, CML, right cerebellar mass, status post brain mass removal and 2/29/2024 at [facility] presents today accompanied with her daughter complaining of fever. Admit for post op fever unknown source.

## 2024-03-19 DIAGNOSIS — I48.0 PAROXYSMAL ATRIAL FIBRILLATION: ICD-10-CM

## 2024-03-19 DIAGNOSIS — E87.6 HYPOKALEMIA: ICD-10-CM

## 2024-03-19 LAB
ANION GAP SERPL CALC-SCNC: 6 MMOL/L — SIGNIFICANT CHANGE UP (ref 5–17)
BUN SERPL-MCNC: 15 MG/DL — SIGNIFICANT CHANGE UP (ref 7–23)
CALCIUM SERPL-MCNC: 8.9 MG/DL — SIGNIFICANT CHANGE UP (ref 8.5–10.1)
CHLORIDE SERPL-SCNC: 105 MMOL/L — SIGNIFICANT CHANGE UP (ref 96–108)
CO2 SERPL-SCNC: 29 MMOL/L — SIGNIFICANT CHANGE UP (ref 22–31)
CREAT SERPL-MCNC: 0.47 MG/DL — LOW (ref 0.5–1.3)
EGFR: 98 ML/MIN/1.73M2 — SIGNIFICANT CHANGE UP
GLUCOSE SERPL-MCNC: 95 MG/DL — SIGNIFICANT CHANGE UP (ref 70–99)
HCT VFR BLD CALC: 37 % — SIGNIFICANT CHANGE UP (ref 34.5–45)
HGB BLD-MCNC: 11.9 G/DL — SIGNIFICANT CHANGE UP (ref 11.5–15.5)
MAGNESIUM SERPL-MCNC: 1.8 MG/DL — SIGNIFICANT CHANGE UP (ref 1.6–2.6)
MCHC RBC-ENTMCNC: 27.7 PG — SIGNIFICANT CHANGE UP (ref 27–34)
MCHC RBC-ENTMCNC: 32.2 G/DL — SIGNIFICANT CHANGE UP (ref 32–36)
MCV RBC AUTO: 86.2 FL — SIGNIFICANT CHANGE UP (ref 80–100)
NRBC # BLD: 0 /100 WBCS — SIGNIFICANT CHANGE UP (ref 0–0)
PHOSPHATE SERPL-MCNC: 2.7 MG/DL — SIGNIFICANT CHANGE UP (ref 2.5–4.5)
PLATELET # BLD AUTO: 474 K/UL — HIGH (ref 150–400)
POTASSIUM SERPL-MCNC: 3.4 MMOL/L — LOW (ref 3.5–5.3)
POTASSIUM SERPL-SCNC: 3.4 MMOL/L — LOW (ref 3.5–5.3)
RBC # BLD: 4.29 M/UL — SIGNIFICANT CHANGE UP (ref 3.8–5.2)
RBC # FLD: 14.9 % — HIGH (ref 10.3–14.5)
SODIUM SERPL-SCNC: 140 MMOL/L — SIGNIFICANT CHANGE UP (ref 135–145)
WBC # BLD: 11.86 K/UL — HIGH (ref 3.8–10.5)
WBC # FLD AUTO: 11.86 K/UL — HIGH (ref 3.8–10.5)

## 2024-03-19 PROCEDURE — 71250 CT THORAX DX C-: CPT | Mod: 26

## 2024-03-19 PROCEDURE — 99232 SBSQ HOSP IP/OBS MODERATE 35: CPT

## 2024-03-19 PROCEDURE — 99231 SBSQ HOSP IP/OBS SF/LOW 25: CPT

## 2024-03-19 RX ORDER — POTASSIUM CHLORIDE 20 MEQ
40 PACKET (EA) ORAL ONCE
Refills: 0 | Status: COMPLETED | OUTPATIENT
Start: 2024-03-19 | End: 2024-03-19

## 2024-03-19 RX ADMIN — FLUTICASONE FUROATE, UMECLIDINIUM BROMIDE AND VILANTEROL TRIFENATATE 1 PUFF(S): 200; 62.5; 25 POWDER RESPIRATORY (INHALATION) at 11:52

## 2024-03-19 RX ADMIN — ENOXAPARIN SODIUM 40 MILLIGRAM(S): 100 INJECTION SUBCUTANEOUS at 17:52

## 2024-03-19 RX ADMIN — VALSARTAN 160 MILLIGRAM(S): 80 TABLET ORAL at 05:17

## 2024-03-19 RX ADMIN — Medication 650 MILLIGRAM(S): at 06:49

## 2024-03-19 RX ADMIN — AMLODIPINE BESYLATE 10 MILLIGRAM(S): 2.5 TABLET ORAL at 05:17

## 2024-03-19 RX ADMIN — Medication 40 MILLIEQUIVALENT(S): at 09:49

## 2024-03-19 RX ADMIN — Medication 650 MILLIGRAM(S): at 17:51

## 2024-03-19 RX ADMIN — Medication 650 MILLIGRAM(S): at 05:49

## 2024-03-19 RX ADMIN — ATENOLOL 50 MILLIGRAM(S): 25 TABLET ORAL at 05:17

## 2024-03-19 RX ADMIN — POLYETHYLENE GLYCOL 3350 17 GRAM(S): 17 POWDER, FOR SOLUTION ORAL at 11:54

## 2024-03-19 RX ADMIN — Medication 10 MILLIGRAM(S): at 11:54

## 2024-03-19 NOTE — PROGRESS NOTE ADULT - ASSESSMENT
A/P-  78-year-old female with history of hypertension, small cell carcinoma of the lung, COPD, anxiety, paroxysmal A-fib on Xarelto, CML, right cerebellar mass, status post brain mass removal and 2/29/2024 at [facility] presented on 3/15 accompanied with her daughter complaining of fever which started couple days prior to admission  had Tmax of 101 at home, they did call [facility] and were seen for their postop visit.  Patient's wound was evaluated told everything was normal and had blood test done.  They were told everything was normal and discharged home, patient continued to have fever and given Tylenol every 6 hours.  3/15/2024 patient had Tmax of 102, patient started attempt to call the neurosurgeon again today, were told to go to an urgent care was sent to the ER for further workup.           afebrile since yesterday   minimal eukocytosis of 11,000 today  UA- negative  blood cx from admission 3/15- NGTD x 2    MRI of the neck reported as -  ostoperative changes compatible with a right suboccipital craniectomy   meshlike cranioplasty is identified. There is evidence of T1 shortening   identified in the posterior region which could be compatible areas of   hemorrhage and or posterior material. There is also adjacent areas of   enhancement seen in this region (9-41) this could be compatible with   postop changes though possibly of residual tumor cannot be entirely   excluded. Clinical correlation and continued close interval follow-up is   recommended.  Extra axial collection in the postop region is identified which likely   related to postop changes. This finding measures approximate 1 cm in   widest diameter. Small extradural collection is seen in the postop region   which measures approximately 2.8 mm in widest diameter. This could be   compatible with a tiny pseudomeningocele. Adjacent area of edema seen as   well.    repeat blood cx 3/18- pending  repeat RVP from 3/18- negative  LE US- reported negative for DVT    plan-  await repeat blood cx results from yesterday.  if has fever today then check CXR as well as her O2 sat was slightly lower as well on RA.  MRI of the neck as per report most likely post-op changes and or ? small hematoma around occipital surgical site    advise to continue to hold antibiotics pending above results.    also would advise for medicine team to contact patient's neurosurgeon at [facility] to discuss above MRI findings and seeking their advise as well in light of her recent surgery.        All labs and imaging and chart notes reviewed.     All above discussed with patient and patient verbalizes full understanding of all above and agrees with above plan of care.      d/w  the hospitalist as well.    Glen Ruiz MD  Infectious Disease Attending    for any questions please do not hesitate to contact me either via teams or by calling [phone]

## 2024-03-19 NOTE — PROGRESS NOTE ADULT - PROBLEM SELECTOR PLAN 1
recent neurosurgery at [facility] for right cerebellar mass in 2/29/202 present with fever    ct heard ? developing abscess-   continue with antbx   obtain mri  with and with out contrast    attempt to contact [facility]  f/u culture data  3/17/2024 - blood cx negative   urine cx negative   f/u vanco trough   f/u brain mri  3/18/52899 brain mri as above, had a fever overnight   consulting ID team case d/w Id.   get rvp blood cx and dopplers  3/19/2024- fever yesterday, dopplers negative, RVP negative, follow up repeat blood culture,

## 2024-03-19 NOTE — PROGRESS NOTE ADULT - PROBLEM SELECTOR PLAN 3
bp stable continue with meds    monitor bp
bp stable continue with meds    monitor bp  3/17/2024 bp elevated- increase norvasc
bp stable continue with meds    monitor bp  3/17/2024 bp elevated- increase Norvasc  3/18/2024 may need increase in Valsartan for optimal BP control  3/19/2024-bp stable
bp stable continue with meds    monitor bp  3/17/2024 bp elevated- increase norvasc  3/18/2024 may need increase in Valsartan for optimal BP control

## 2024-03-19 NOTE — PROGRESS NOTE ADULT - PROBLEM SELECTOR PLAN 5
per pt to resume Ac on 3/14 however has some post op changes on mri brain that may be blood or  maybe post surgical material. gave copy of mri done here  to take to her Neurosurgeon  to get clearance when can resume AC

## 2024-03-19 NOTE — PROGRESS NOTE ADULT - ASSESSMENT
78-year-old female with history of hypertension, small cell carcinoma of the lung, COPD, anxiety, paroxysmal A-fib on Xarelto, CML, right cerebellar mass, status post brain mass removal and 2/29/2024 at [facility] presents today accompanied with her daughter complaining of fever. Admit for post op fever unknown source.

## 2024-03-19 NOTE — PROGRESS NOTE ADULT - SUBJECTIVE AND OBJECTIVE BOX
Patient is a 78y old  Female who presents with a chief complaint of Febrile, s/p brain surgery. (15 Mar 2024 23:37)      OVERNIGHT EVENTS:  fever    MEDICATIONS  (STANDING):  amLODIPine   Tablet 10 milliGRAM(s) Oral daily  atenolol  Tablet 50 milliGRAM(s) Oral daily  enoxaparin Injectable 40 milliGRAM(s) SubCutaneous every 24 hours  FLUoxetine 10 milliGRAM(s) Oral daily  fluticasone furoate/umeclidinium/vilanterol 100-62.5-25 MICROgram(s) Inhaler 1 Puff(s) Inhalation daily  polyethylene glycol 3350 17 Gram(s) Oral daily  valsartan 160 milliGRAM(s) Oral daily    MEDICATIONS  (PRN):  acetaminophen     Tablet .. 650 milliGRAM(s) Oral every 6 hours PRN Temp greater or equal to 38C (100.4F), Mild Pain (1 - 3)  albuterol    90 MICROgram(s) HFA Inhaler 2 Puff(s) Inhalation every 6 hours PRN Shortness of Breath and/or Wheezing  aluminum hydroxide/magnesium hydroxide/simethicone Suspension 30 milliLiter(s) Oral every 4 hours PRN Dyspepsia  clonazePAM  Tablet 0.25 milliGRAM(s) Oral two times a day PRN anxiety  melatonin 3 milliGRAM(s) Oral at bedtime PRN Insomnia  ondansetron Injectable 4 milliGRAM(s) IV Push every 8 hours PRN Nausea and/or Vomiting    Allergies    No Known Allergies    Intolerances        SUBJECTIVE: in bed in NAD, no acute events overnight     Vital Signs Last 24 Hrs  T(C): 37.2 (19 Mar 2024 05:12), Max: 38.4 (18 Mar 2024 11:10)  T(F): 98.9 (19 Mar 2024 05:12), Max: 101.2 (18 Mar 2024 11:10)  HR: 76 (19 Mar 2024 05:12) (76 - 94)  BP: 143/63 (19 Mar 2024 05:12) (105/58 - 144/66)  BP(mean): --  RR: 17 (19 Mar 2024 05:12) (17 - 19)  SpO2: 93% (19 Mar 2024 05:12) (92% - 97%)    Parameters below as of 18 Mar 2024 16:44  Patient On (Oxygen Delivery Method): room air        PHYSICAL EXAM:  GENERAL: NAD, well-groomed, well-developed  HEAD:  post op surgical changes   EYES: EOMI, PERRLA, conjunctiva and sclera clear  ENMT: No tonsillar erythema, exudates, or enlargement; Moist mucous membranes, Good dentition, No lesions  NECK: Supple,   CHEST/LUNG: Clear to  auscultation bilaterally; No rales, rhonchi, wheezing, or rubs  bilaterally  HEART: Regular rate and rhythm; No murmurs, rubs, or gallops  ABDOMEN: Soft, Nontender, Nondistended; Bowel sounds present  EXTREMITIES:  2+ Peripheral Pulses, No clubbing, cyanosis, or edema BL LE  SKIN: No rashes or lesions  NERVOUS SYSTEM:  Alert & Oriented X3, Good concentration; Motor Strength 5/5 B/L upper and lower extremities;   DTRs 2+ intact and symmetric, sensation intact BL    LABS:                                   13.3   9.75  )-----------( 393      ( 17 Mar 2024 08:05 )             41.7         137  |  102  |  8   ----------------------------<  120<H>  3.4<L>   |  29  |  0.62    Ca    8.9      17 Mar 2024 08:05  Phos  2.9       Mg     1.9            PTT - ( 15 Mar 2024 20:00 )  PTT:28.8 sec  Urinalysis Basic - ( 15 Mar 2024 22:10 )    Color: Yellow / Appearance: Clear / S.020 / pH: x  Gluc: x / Ketone: Negative mg/dL  / Bili: Negative / Urobili: 1.0 mg/dL   Blood: x / Protein: Trace mg/dL / Nitrite: Negative   Leuk Esterase: Trace / RBC: 1 /HPF / WBC 4 /HPF   Sq Epi: x / Non Sq Epi: x / Bacteria: Few /HPF      Cultures;   CAPILLARY BLOOD GLUCOSE        Lipid panel:           RADIOLOGY & ADDITIONAL TESTS:  < from: MR Head w/wo IV Cont (24 @ 10:53) >    IMPRESSION: Postop changes as described above.    < end of copied text >      Imaging Personally Reviewed:  [ x] YES      Consultant(s) Notes Reviewed:  [x ] YES     Care Discussed with [x ] Consultants [X ] Patient [x ] Family  [x ]    [x ]  Other; RN

## 2024-03-19 NOTE — PROGRESS NOTE ADULT - SUBJECTIVE AND OBJECTIVE BOX
[facility]  INFECTIOUS DISEASES   [address] [place] [address]  Tel: [phone]     Fax: [phone]  ==============================================================================  MD Bharathi Medeiros, DO Heidi Pretty, NP   ==============================================================================      KALA VALLEJO  MRN-[id number]  78y ([birth year])      Interval History:    ROS:    [ ] Unobtainable because:  [ ] All other systems negative except as noted    Constitutional: no fever, no chills  Head: no trauma  Eyes: no vision changes, no eye pain  ENT:  no sore throat, no rhinorrhea  Cardiovascular:  no chest pain, no palpitation  Respiratory:  no SOB, no cough  GI:  no abd pain, no vomiting, no diarrhea  urinary: no dysuria, no hematuria, no flank pain  musculoskeletal:  no joint pain, no joint swelling  skin:  no rash  neurology:  no headache, no seizure, no change in mental status  psych: no anxiety, no depression         Allergies  No Known Allergies        ANTIMICROBIALS:        Physical Exam:  Vital Signs Last 24 Hrs  T(C): 37.2 (19 Mar 2024 05:12), Max: 38.4 (18 Mar 2024 11:10)  T(F): 98.9 (19 Mar 2024 05:12), Max: 101.2 (18 Mar 2024 11:10)  HR: 76 (19 Mar 2024 05:12) (76 - 94)  BP: 143/63 (19 Mar 2024 05:12) (105/58 - 144/66)  BP(mean): --  RR: 17 (19 Mar 2024 05:12) (17 - 19)  SpO2: 93% (19 Mar 2024 05:12) (92% - 97%)    Parameters below as of 18 Mar 2024 16:44  Patient On (Oxygen Delivery Method): room air        03-18-24 @ 07:01  -  03-19-24 @ 07:00  --------------------------------------------------------  IN: 120 mL / OUT: 0 mL / NET: 120 mL      General:    NAD,  non toxic  Head: atraumatic, normocephalic  Eye: normal sclera and conjunctiva  ENT:    no oral lesions, neck supple  Cardio:     regular S1, S2,  no murmur  Respiratory:    clear b/l,    no wheezing  abd:     soft,   BS +,   no tenderness  :   no CVAT,  no suprapubic tenderness,   no  rashid  Musculoskeletal:   no joint swelling,   no edema  vascular: no central lines, +PIV   Skin:    no rash  Neurologic:     no focal deficit  psych: normal affect    WBC Count: 11.86 K/uL (03-19 @ 05:19)  WBC Count: 9.75 K/uL (03-17 @ 08:05)  WBC Count: 10.10 K/uL (03-15 @ 20:00)                            11.9   11.86 )-----------( 474      ( 19 Mar 2024 05:19 )             37.0       03-19    140  |  105  |  15  ----------------------------<  95  3.4<L>   |  29  |  0.47<L>    Ca    8.9      19 Mar 2024 05:19  Phos  2.7     03-19  Mg     1.8     03-19        Urinalysis Basic - ( 19 Mar 2024 05:19 )    Color: x / Appearance: x / SG: x / pH: x  Gluc: 95 mg/dL / Ketone: x  / Bili: x / Urobili: x   Blood: x / Protein: x / Nitrite: x   Leuk Esterase: x / RBC: x / WBC x   Sq Epi: x / Non Sq Epi: x / Bacteria: x          Creatinine Trend: 0.47<--, 0.62<--, 0.55<--, 0.65<--, 0.53<--, 0.49<--      MICROBIOLOGY:  Vancomycin Level, Trough: 11.6 ug/mL (03-18-24 @ 12:13)  v  Clean Catch Clean Catch (Midstream)  03-15-24   <10,000 CFU/mL Normal Urogenital Cristina  --  --      .Blood Blood-Peripheral  03-15-24   No growth at 72 Hours  --  --            Rapid RVP Result: NotDetec (03-18 @ 14:43)  Rapid RVP Result: NotDetec (03-15 @ 20:00)        C-Reactive Protein, Serum: 98 (03-14)          Procalcitonin, Serum: 0.12 (03-14-24 @ 20:00)    SARS-CoV-2: NotDetec (03-18-24 @ 14:43)  Rapid RVP Result: NotDetec (03-18-24 @ 14:43)  SARS-CoV-2: NotDetec (03-15-24 @ 20:00)  Rapid RVP Result: NotDetec (03-15-24 @ 20:00)        RADIOLOGY:   [facility]  INFECTIOUS DISEASES   [address] [place] [address]  Tel: [phone]     Fax: [phone]  ==============================================================================  MD Bharathi Medeiros, DO Heidi Pretty, NP   ==============================================================================      KAAL VALLEJO  MRN-[id number]  78y ([birth year])      Interval History:      Patient seen and examined today.  she denies  any chills.  last fever was yesterday   she does mentions light cough today and her O2 sat on RA was 93 % earlier but then it ping to 96%    she denies any HA, denies any sob, denies any abd pain, denies any nausea or vomiting    denies any diarrhea    ROS:      Constitutional: no fever, no chills  Head: no trauma  Eyes: no vision changes, no eye pain  ENT:  no sore throat, no rhinorrhea  Cardiovascular:  no chest pain, no palpitation  Respiratory:  no SOB, slight cough   GI:  no abd pain, no vomiting, no diarrhea  urinary: no dysuria, no hematuria, no flank pain  musculoskeletal:  no joint pain, no joint swelling  skin:  no rash  neurology:  no headache         Allergies  No Known Drug Allergies        ANTIMICROBIALS:        Physical Exam:  Vital Signs Last 24 Hrs  T(C): 37.2 (19 Mar 2024 05:12), Max: 38.4 (18 Mar 2024 11:10)  T(F): 98.9 (19 Mar 2024 05:12), Max: 101.2 (18 Mar 2024 11:10)  HR: 76 (19 Mar 2024 05:12) (76 - 94)  BP: 143/63 (19 Mar 2024 05:12) (105/58 - 144/66)  BP(mean): --  RR: 17 (19 Mar 2024 05:12) (17 - 19)  SpO2: 93% (19 Mar 2024 05:12) (92% - 97%)    Parameters below as of 18 Mar 2024 16:44  Patient On (Oxygen Delivery Method): room air        03-18-24 @ 07:01  -  03-19-24 @ 07:00  --------------------------------------------------------  IN: 120 mL / OUT: 0 mL / NET: 120 mL      General:    NAD,  non toxic  Head: atraumatic, normocephalic  Eye: normal sclera and conjunctiva  ENT:    no oral lesions, neck supple  Cardio:     regular S1, S2,  no murmur  Respiratory:     no wheezing, no crackles  abd:     soft,   BS +,   no tenderness, ND  :   no CVAT,  no suprapubic tenderness,   no  rashid  Musculoskeletal:   no joint swelling,   no edema  vascular: no central lines, +PIV   Skin:    no rash  Neurologic:     no focal deficit  psych: normal affect    WBC Count: 11.86 K/uL (03-19 @ 05:19)  WBC Count: 9.75 K/uL (03-17 @ 08:05)  WBC Count: 10.10 K/uL (03-15 @ 20:00)                            11.9   11.86 )-----------( 474      ( 19 Mar 2024 05:19 )             37.0       03-19    140  |  105  |  15  ----------------------------<  95  3.4<L>   |  29  |  0.47<L>    Ca    8.9      19 Mar 2024 05:19  Phos  2.7     03-19  Mg     1.8     03-19        Urinalysis Basic - ( 19 Mar 2024 05:19 )    Color: x / Appearance: x / SG: x / pH: x  Gluc: 95 mg/dL / Ketone: x  / Bili: x / Urobili: x   Blood: x / Protein: x / Nitrite: x   Leuk Esterase: x / RBC: x / WBC x   Sq Epi: x / Non Sq Epi: x / Bacteria: x          Creatinine Trend: 0.47<--, 0.62<--, 0.55<--, 0.65<--, 0.53<--, 0.49<--      MICROBIOLOGY:  Vancomycin Level, Trough: 11.6 ug/mL (03-18-24 @ 12:13)      Clean Catch Clean Catch (Midstream)  03-15-24   <10,000 CFU/mL Normal Urogenital Cristina  --  --      .Blood Blood-Peripheral  03-15-24   No growth at 72 Hours  --  --            Rapid RVP Result: NotDetec (03-18 @ 14:43)  Rapid RVP Result: NotDetec (03-15 @ 20:00)        C-Reactive Protein, Serum: 98 (03-14)          Procalcitonin, Serum: 0.12 (03-14-24 @ 20:00)    SARS-CoV-2: NotDetec (03-18-24 @ 14:43)  Rapid RVP Result: NotDetec (03-18-24 @ 14:43)  SARS-CoV-2: NotDetec (03-15-24 @ 20:00)  Rapid RVP Result: NotDetec (03-15-24 @ 20:00)        RADIOLOGY:  < from: US Duplex Venous Lower Ext Complete, Bilateral (03.18.24 @ 16:56) >    ACC: [id number] EXAM:  US DPLX LWR EXT VEINS COMPL BI   ORDERED BY: ROXANN LAW     PROCEDURE DATE:  03/18/2024          INTERPRETATION:  CLINICAL INFORMATION: Fever    COMPARISON: None available.    TECHNIQUE: Duplex sonography of the BILATERALLOWER extremity veins with   color and spectral Doppler, with and without compression.    FINDINGS:    RIGHT:  Normal compressibility of the RIGHT common femoral, femoral and popliteal   veins.  Doppler examination shows normal spontaneous and phasicflow.  No RIGHT calf vein thrombosis is detected.    LEFT:  Normal compressibility of the LEFT common femoral, femoral and popliteal   veins.  Doppler examination shows normal spontaneous and phasic flow.  No LEFT calf vein thrombosis is detected.    IMPRESSION:  No evidence of deep venous thrombosis in either lower extremity.            --- End of Report ---      JOCELYN CHE MD; Attending Radiologist  This document has been electronically signed. Mar 18 2024  5:10PM    < end of copied text >

## 2024-03-19 NOTE — PROGRESS NOTE ADULT - PROBLEM SELECTOR PLAN 2
- pt on trelogy does not want to us Symbicort  - Will bring in home medication   albuterol
- pt on Trelegy does not want to us Symbicort  - Will bring in home medication   albuterol   3/19/2024-continue trelogy

## 2024-03-20 ENCOUNTER — TRANSCRIPTION ENCOUNTER (OUTPATIENT)
Age: 79
End: 2024-03-20

## 2024-03-20 VITALS
OXYGEN SATURATION: 97 % | HEART RATE: 78 BPM | RESPIRATION RATE: 18 BRPM | SYSTOLIC BLOOD PRESSURE: 128 MMHG | DIASTOLIC BLOOD PRESSURE: 89 MMHG | TEMPERATURE: 98 F

## 2024-03-20 LAB
ANION GAP SERPL CALC-SCNC: 7 MMOL/L — SIGNIFICANT CHANGE UP (ref 5–17)
BUN SERPL-MCNC: 10 MG/DL — SIGNIFICANT CHANGE UP (ref 7–23)
CALCIUM SERPL-MCNC: 9.1 MG/DL — SIGNIFICANT CHANGE UP (ref 8.5–10.1)
CHLORIDE SERPL-SCNC: 106 MMOL/L — SIGNIFICANT CHANGE UP (ref 96–108)
CO2 SERPL-SCNC: 25 MMOL/L — SIGNIFICANT CHANGE UP (ref 22–31)
CREAT SERPL-MCNC: 0.34 MG/DL — LOW (ref 0.5–1.3)
EGFR: 106 ML/MIN/1.73M2 — SIGNIFICANT CHANGE UP
GLUCOSE SERPL-MCNC: 95 MG/DL — SIGNIFICANT CHANGE UP (ref 70–99)
HCT VFR BLD CALC: 41 % — SIGNIFICANT CHANGE UP (ref 34.5–45)
HGB BLD-MCNC: 13.6 G/DL — SIGNIFICANT CHANGE UP (ref 11.5–15.5)
MAGNESIUM SERPL-MCNC: 2.1 MG/DL — SIGNIFICANT CHANGE UP (ref 1.6–2.6)
MCHC RBC-ENTMCNC: 28.6 PG — SIGNIFICANT CHANGE UP (ref 27–34)
MCHC RBC-ENTMCNC: 33.2 G/DL — SIGNIFICANT CHANGE UP (ref 32–36)
MCV RBC AUTO: 86.1 FL — SIGNIFICANT CHANGE UP (ref 80–100)
NRBC # BLD: 0 /100 WBCS — SIGNIFICANT CHANGE UP (ref 0–0)
PHOSPHATE SERPL-MCNC: 2.9 MG/DL — SIGNIFICANT CHANGE UP (ref 2.5–4.5)
PLATELET # BLD AUTO: 576 K/UL — HIGH (ref 150–400)
POTASSIUM SERPL-MCNC: 3.6 MMOL/L — SIGNIFICANT CHANGE UP (ref 3.5–5.3)
POTASSIUM SERPL-SCNC: 3.6 MMOL/L — SIGNIFICANT CHANGE UP (ref 3.5–5.3)
RBC # BLD: 4.76 M/UL — SIGNIFICANT CHANGE UP (ref 3.8–5.2)
RBC # FLD: 14.8 % — HIGH (ref 10.3–14.5)
SODIUM SERPL-SCNC: 138 MMOL/L — SIGNIFICANT CHANGE UP (ref 135–145)
TSH SERPL-MCNC: 1.5 UIU/ML — SIGNIFICANT CHANGE UP (ref 0.36–3.74)
WBC # BLD: 15.28 K/UL — HIGH (ref 3.8–10.5)
WBC # FLD AUTO: 15.28 K/UL — HIGH (ref 3.8–10.5)

## 2024-03-20 PROCEDURE — 99232 SBSQ HOSP IP/OBS MODERATE 35: CPT

## 2024-03-20 RX ORDER — ALBUTEROL 90 UG/1
2 AEROSOL, METERED ORAL
Qty: 0 | Refills: 0 | DISCHARGE
Start: 2024-03-20

## 2024-03-20 RX ORDER — VALSARTAN 80 MG/1
1 TABLET ORAL
Qty: 0 | Refills: 0 | DISCHARGE
Start: 2024-03-20

## 2024-03-20 RX ORDER — ALBUTEROL 90 UG/1
2 AEROSOL, METERED ORAL
Qty: 0 | Refills: 0 | DISCHARGE

## 2024-03-20 RX ORDER — SERTRALINE 25 MG/1
1 TABLET, FILM COATED ORAL
Refills: 0 | DISCHARGE

## 2024-03-20 RX ADMIN — Medication 650 MILLIGRAM(S): at 01:03

## 2024-03-20 RX ADMIN — ATENOLOL 50 MILLIGRAM(S): 25 TABLET ORAL at 05:31

## 2024-03-20 RX ADMIN — Medication 650 MILLIGRAM(S): at 05:32

## 2024-03-20 RX ADMIN — FLUTICASONE FUROATE, UMECLIDINIUM BROMIDE AND VILANTEROL TRIFENATATE 1 PUFF(S): 200; 62.5; 25 POWDER RESPIRATORY (INHALATION) at 12:09

## 2024-03-20 RX ADMIN — Medication 650 MILLIGRAM(S): at 06:32

## 2024-03-20 RX ADMIN — Medication 10 MILLIGRAM(S): at 12:19

## 2024-03-20 RX ADMIN — AMLODIPINE BESYLATE 10 MILLIGRAM(S): 2.5 TABLET ORAL at 05:31

## 2024-03-20 RX ADMIN — Medication 650 MILLIGRAM(S): at 12:19

## 2024-03-20 RX ADMIN — ENOXAPARIN SODIUM 40 MILLIGRAM(S): 100 INJECTION SUBCUTANEOUS at 17:42

## 2024-03-20 RX ADMIN — Medication 650 MILLIGRAM(S): at 00:03

## 2024-03-20 RX ADMIN — VALSARTAN 160 MILLIGRAM(S): 80 TABLET ORAL at 05:31

## 2024-03-20 NOTE — PROGRESS NOTE ADULT - TIME BILLING
I have spent a total of 52 minutes to prepare to see the patient, obtaining and reviewing history, physical examination, explaining the diagnosis, prognosis and treatment plan with the patient/family/caregiver. I also have spent the time ordering studies and testing, interpreting results, medicine reconciliation, IDRs, subspecialty consultation and documentation as above.
38 min  spent reviewing chart, examining patient, discussing plan with patient and/or family, as well as other health care providers and team members.  Writing  progress notes , H&P or consult notes, as well as ordering possible labs, images and/or  interventional procedures.

## 2024-03-20 NOTE — PROGRESS NOTE ADULT - ASSESSMENT
78-year-old female with history of hypertension, small cell carcinoma of the lung, COPD, anxiety, paroxysmal A-fib on Xarelto, CML, right cerebellar mass, status post brain mass removal and 2/29/2024 at [facility] presents today accompanied with her daughter complaining of fever. Admit for post op fever unknown source.     ##Postsurgical fever  ##FUO   ·  recent neurosurgery at [facility] [place] in 2/29/202 present with fever    ct head reviewed concerning for developing abscess. follow up MRI of head and neck reviewed - Postoperative changes compatible with a right suboccipital craniectomy meshlike cranioplasty is identified. There is evidence of T1 shortening identified in the posterior region which could be compatible areas of   hemorrhage and or posterior material. There is also adjacent areas of enhancement seen in this region (9-41) this could be compatible with postop changes though possibly of residual tumor cannot be entirely   excluded. Continue to have fevers. last one 100.6F on tylenol, uptrending leukocytosis. RVP negative. 2 sets of blood cultures negative. Bilateral lower extremity doppler negative for DVT.   - Check Thyroid panel, HIV, RODRIGO, ESR, CRP, EBV  - Check Echo r/o vegetation   - ID following - monitoring off ABx   - Reached out to [facility] Dr. Sidney Bowman who performed R cerebellar mass removal. He is [place] but awaiting call back from office.     #COPD (chronic obstructive pulmonary disease).   ·  Patient brought in home trelogy    # Hypertension.   ·  C/w Norvasc and Valsartan     #Hypokalemia.   ·  will be replaced.    #Paroxysmal atrial fibrillation.   - C/w BB  ·   per pt to resume Ac on 3/14  - Per my colleague "has some post op changes on mri brain that may be blood or  maybe post surgical material. gave copy of mri done here  to take to her Neurosurgeon to get clearance when can resume AC."    Diet: regular   DVT prophylaxis: lovenox   Dispo: admit   Code Status: FC

## 2024-03-20 NOTE — DISCHARGE NOTE NURSING/CASE MANAGEMENT/SOCIAL WORK - PATIENT PORTAL LINK FT
You can access the FollowMyHealth Patient Portal offered by [facility] by registering at the following website: [email or web address]. By joining [employer]’s FollowMyHealth portal, you will also be able to view your health information using other applications (apps) compatible with our system.

## 2024-03-20 NOTE — PROGRESS NOTE ADULT - REASON FOR ADMISSION
Febrile, s/p brain surgery.

## 2024-03-20 NOTE — PROGRESS NOTE ADULT - SUBJECTIVE AND OBJECTIVE BOX
KALA VALLEJO  MRN-[id number]    Follow Up:  fever, hx of CML    Interval History: the pt was seen and examined earlier, not in acute distress, pt is awake and alert, appropriate, ambulates around the room without difficulty. Pt had a low grade temp yesterday, none today, RA, WBC elevated 15.28.     PAST MEDICAL & SURGICAL HISTORY:  CML (chronic myelocytic leukemia)      COPD (chronic obstructive pulmonary disease)      Hypertension      Solitary pulmonary nodule      Lung cancer      Atrial fibrillation      Hypertension      Leukemia      COPD (chronic obstructive pulmonary disease)      History of hysterectomy      Pilonidal cyst          ROS:    [ ] Unobtainable because:  [x ] All other systems negative    Constitutional: no fever, no chills  Head: no trauma  Eyes: no vision changes, no eye pain  ENT:  no sore throat, no rhinorrhea  Cardiovascular:  no chest pain, no palpitation  Respiratory:  no SOB, no cough  GI:  no abd pain, no vomiting, no diarrhea  urinary: no dysuria, no hematuria, no flank pain  musculoskeletal:  no joint pain, no joint swelling  skin:  no rash  neurology:  no headache, no seizure, no change in mental status  psych: no anxiety, no depression         Allergies  No Known Allergies        ANTIMICROBIALS:      OTHER MEDS:  acetaminophen     Tablet .. 650 milliGRAM(s) Oral every 6 hours PRN  albuterol    90 MICROgram(s) HFA Inhaler 2 Puff(s) Inhalation every 6 hours PRN  aluminum hydroxide/magnesium hydroxide/simethicone Suspension 30 milliLiter(s) Oral every 4 hours PRN  amLODIPine   Tablet 10 milliGRAM(s) Oral daily  atenolol  Tablet 50 milliGRAM(s) Oral daily  clonazePAM  Tablet 0.25 milliGRAM(s) Oral two times a day PRN  enoxaparin Injectable 40 milliGRAM(s) SubCutaneous every 24 hours  FLUoxetine 10 milliGRAM(s) Oral daily  fluticasone furoate/umeclidinium/vilanterol 100-62.5-25 MICROgram(s) Inhaler 1 Puff(s) Inhalation daily  melatonin 3 milliGRAM(s) Oral at bedtime PRN  ondansetron Injectable 4 milliGRAM(s) IV Push every 8 hours PRN  polyethylene glycol 3350 17 Gram(s) Oral daily  valsartan 160 milliGRAM(s) Oral daily      Vital Signs Last 24 Hrs  T(C): 36.8 (20 Mar 2024 11:03), Max: 38.1 (19 Mar 2024 17:44)  T(F): 98.2 (20 Mar 2024 11:03), Max: 100.6 (19 Mar 2024 17:44)  HR: 80 (20 Mar 2024 11:03) (78 - 101)  BP: 124/73 (20 Mar 2024 11:03) (124/73 - 165/78)  BP(mean): --  RR: 17 (20 Mar 2024 11:03) (17 - 18)  SpO2: 98% (20 Mar 2024 11:03) (92% - 98%)    Parameters below as of 20 Mar 2024 11:03  Patient On (Oxygen Delivery Method): room air        Physical Exam:  General:    NAD,  non toxic, RA  Head: atraumatic, normocephalic, right posterior scar - intact, no erythema or swelling, not tender, no drainage   Eye: normal sclera and conjunctiva  ENT:    no oral lesions, neck supple  Cardio:     regular S1, S2,  no murmur  Respiratory:     no wheezing, no crackles  abd:     soft,   BS +,   no tenderness, ND  :   no CVAT,  no suprapubic tenderness,   no  rashid  Musculoskeletal:   no joint swelling,   no edema, ambulates without difficulty   vascular: no central lines, +PIV   Skin:    no rash, some small bruising post injections on pt's abdomen   Neurologic: awake and alert, appropriate, no focal deficit  psych: normal affect      WBC Count: 15.28 K/uL (03-20 @ 08:04)  WBC Count: 11.86 K/uL (03-19 @ 05:19)  WBC Count: 9.75 K/uL (03-17 @ 08:05)  WBC Count: 10.10 K/uL (03-15 @ 20:00)                            13.6   15.28 )-----------( 576      ( 20 Mar 2024 08:04 )             41.0       03-20    138  |  106  |  10  ----------------------------<  95  3.6   |  25  |  0.34<L>    Ca    9.1      20 Mar 2024 08:04  Phos  2.9     03-20  Mg     2.1     03-20        Urinalysis Basic - ( 20 Mar 2024 08:04 )    Color: x / Appearance: x / SG: x / pH: x  Gluc: 95 mg/dL / Ketone: x  / Bili: x / Urobili: x   Blood: x / Protein: x / Nitrite: x   Leuk Esterase: x / RBC: x / WBC x   Sq Epi: x / Non Sq Epi: x / Bacteria: x        Creatinine Trend: 0.34<--, 0.47<--, 0.62<--, 0.55<--, 0.65<--, 0.53<--      MICROBIOLOGY:  v  .Blood Blood-Peripheral  03-18-24   No growth at 24 hours  --  --      .Blood Blood-Peripheral  03-18-24   No growth at 24 hours  --  --      Clean Catch Clean Catch (Midstream)  03-15-24   <10,000 CFU/mL Normal Urogenital Cristina  --  --      .Blood Blood-Peripheral  03-15-24   No growth at 4 days  --  --          Rapid RVP Result: NotDetec (03-18 @ 14:43)  Rapid RVP Result: NotDetec (03-15 @ 20:00)        C-Reactive Protein, Serum: 98 (03-14)          Procalcitonin, Serum: 0.12 (03-14-24 @ 20:00)    SARS-CoV-2: NotDetec (03-18-24 @ 14:43)  Rapid RVP Result: NotDetec (03-18-24 @ 14:43)  SARS-CoV-2: NotDetec (03-15-24 @ 20:00)  Rapid RVP Result: NotDetec (03-15-24 @ 20:00)    SARS-CoV-2: NotDetec (18 Mar 2024 14:43)  SARS-CoV-2: NotDetec (15 Mar 2024 20:00)    RADIOLOGY:

## 2024-03-20 NOTE — PROGRESS NOTE ADULT - NS ATTEND AMEND GEN_ALL_CORE FT
I have reviewed all pertinent clinical information and agree with the NP's note.  Labs, new imaging (if applicable) and vitals reviewed.  Agree with the above assessment and plan.    s/p brain surgery several weeks ago  today she reports feeling great having no symptoms despite the occasiona fever  she also has CML and showed me her outpatient portal where her baseline wbc is in the 14-15 range   her blood cultures are negative x2 as well as RVP x2   imaging shows post op changes, perhaps hematoma which can cause fevers  both patient and daughter feel they can manage at home   antibiotics have been off for 2 days and remains stable   ok to go home from infectious disease perspective     Discussed with Dr. Imam Bharathi Nova, DO  Chief, Infectious Disease at [facility]  Reachable via [employer] Teams or ID office: [phone]  Weekdays: After 5pm, please call [phone] for all inquiries and new consults  Weekends: Message on-call infectious disease physician via teams (jennifer Macias)

## 2024-03-20 NOTE — DISCHARGE NOTE PROVIDER - PROVIDER TOKENS
FREE:[LAST:[PCP],PHONE:[(   )    -],FAX:[(   )    -],FOLLOWUP:[1 week]],PROVIDER:[TOKEN:[[id number]],FOLLOWUP:[1 week]]

## 2024-03-20 NOTE — PROGRESS NOTE ADULT - SUBJECTIVE AND OBJECTIVE BOX
Patient is a 78y old  Female who presents with a chief complaint of Febrile, s/p brain surgery. (19 Mar 2024 10:23)    INTERVAL HPI/OVERNIGHT EVENTS: NAEON, upset about current admission     MEDICATIONS  (STANDING):  amLODIPine   Tablet 10 milliGRAM(s) Oral daily  atenolol  Tablet 50 milliGRAM(s) Oral daily  enoxaparin Injectable 40 milliGRAM(s) SubCutaneous every 24 hours  FLUoxetine 10 milliGRAM(s) Oral daily  fluticasone furoate/umeclidinium/vilanterol 100-62.5-25 MICROgram(s) Inhaler 1 Puff(s) Inhalation daily  polyethylene glycol 3350 17 Gram(s) Oral daily  valsartan 160 milliGRAM(s) Oral daily    MEDICATIONS  (PRN):  acetaminophen     Tablet .. 650 milliGRAM(s) Oral every 6 hours PRN Temp greater or equal to 38C (100.4F), Mild Pain (1 - 3)  albuterol    90 MICROgram(s) HFA Inhaler 2 Puff(s) Inhalation every 6 hours PRN Shortness of Breath and/or Wheezing  aluminum hydroxide/magnesium hydroxide/simethicone Suspension 30 milliLiter(s) Oral every 4 hours PRN Dyspepsia  clonazePAM  Tablet 0.25 milliGRAM(s) Oral two times a day PRN anxiety  melatonin 3 milliGRAM(s) Oral at bedtime PRN Insomnia  ondansetron Injectable 4 milliGRAM(s) IV Push every 8 hours PRN Nausea and/or Vomiting    Allergies    No Known Allergies    Intolerances      REVIEW OF SYSTEMS:  All other systems reviewed and are negative    Vital Signs Last 24 Hrs  T(C): 36.8 (20 Mar 2024 11:03), Max: 38.1 (19 Mar 2024 16:56)  T(F): 98.2 (20 Mar 2024 11:03), Max: 100.6 (19 Mar 2024 17:44)  HR: 80 (20 Mar 2024 11:03) (78 - 101)  BP: 124/73 (20 Mar 2024 11:03) (124/73 - 165/78)  BP(mean): --  RR: 17 (20 Mar 2024 11:03) (17 - 18)  SpO2: 98% (20 Mar 2024 11:03) (92% - 98%)    Parameters below as of 20 Mar 2024 11:03  Patient On (Oxygen Delivery Method): room air      Daily     Daily   I&O's Summary    19 Mar 2024 07:01  -  20 Mar 2024 07:00  --------------------------------------------------------  IN: 120 mL / OUT: 0 mL / NET: 120 mL      CAPILLARY BLOOD GLUCOSE        PHYSICAL EXAM:  GENERAL: NAD, well-groomed, well-developed  HEAD:  post op surgical changes   EYES: EOMI, PERRLA, conjunctiva and sclera clear  ENMT: No tonsillar erythema, exudates, or enlargement; Moist mucous membranes, Good dentition, No lesions  NECK: Supple,   CHEST/LUNG: Clear to  auscultation bilaterally; No rales, rhonchi, wheezing, or rubs  bilaterally  HEART: Regular rate and rhythm; No murmurs, rubs, or gallops  ABDOMEN: Soft, Nontender, Nondistended; Bowel sounds present  EXTREMITIES:  2+ Peripheral Pulses, No clubbing, cyanosis, or edema BL LE  SKIN: No rashes or lesions  NERVOUS SYSTEM:  Alert & Oriented X3, Good concentration; Motor Strength 5/5 B/L upper and lower extremities;   DTRs 2+ intact and symmetric, sensation intact BL    Labs                          13.6   15.28 )-----------( 576      ( 20 Mar 2024 08:04 )             41.0     03-20    138  |  106  |  10  ----------------------------<  95  3.6   |  25  |  0.34<L>    Ca    9.1      20 Mar 2024 08:04  Phos  2.9     03-20  Mg     2.1     03-20            Urinalysis Basic - ( 20 Mar 2024 08:04 )    Color: x / Appearance: x / SG: x / pH: x  Gluc: 95 mg/dL / Ketone: x  / Bili: x / Urobili: x   Blood: x / Protein: x / Nitrite: x   Leuk Esterase: x / RBC: x / WBC x   Sq Epi: x / Non Sq Epi: x / Bacteria: x        Culture - Blood (collected 18 Mar 2024 15:25)  Source: .Blood Blood-Peripheral  Preliminary Report (20 Mar 2024 01:03):    No growth at 24 hours    Culture - Blood (collected 18 Mar 2024 15:10)  Source: .Blood Blood-Peripheral  Preliminary Report (20 Mar 2024 01:03):    No growth at 24 hours                DVT prophylaxis: > Lovenox 40mg SQ daily  > Heparin   > SCD's

## 2024-03-20 NOTE — DISCHARGE NOTE PROVIDER - CARE PROVIDER_API CALL
PCP,   Phone: (   )    -  Fax: (   )    -  Follow Up Time: 1 week    Sidney Bowman  Follow Up Time: 1 week

## 2024-03-20 NOTE — DISCHARGE NOTE PROVIDER - NSDCCPCAREPLAN_GEN_ALL_CORE_FT
PRINCIPAL DISCHARGE DIAGNOSIS  Diagnosis: Fever  Assessment and Plan of Treatment: follow up with NSG      SECONDARY DISCHARGE DIAGNOSES  Diagnosis: Small cell carcinoma  Assessment and Plan of Treatment:

## 2024-03-20 NOTE — DISCHARGE NOTE PROVIDER - ATTENDING DISCHARGE PHYSICAL EXAMINATION:
GENERAL: NAD, well-groomed, well-developed  HEAD:  post op surgical changes   EYES: EOMI, PERRLA, conjunctiva and sclera clear  ENMT: No tonsillar erythema, exudates, or enlargement; Moist mucous membranes, Good dentition, No lesions  NECK: Supple,   CHEST/LUNG: Clear to  auscultation bilaterally; No rales, rhonchi, wheezing, or rubs  bilaterally  HEART: Regular rate and rhythm; No murmurs, rubs, or gallops  ABDOMEN: Soft, Nontender, Nondistended; Bowel sounds present  EXTREMITIES:  2+ Peripheral Pulses, No clubbing, cyanosis, or edema BL LE  SKIN: No rashes or lesions  NERVOUS SYSTEM:  Alert & Oriented X3, Good concentration; Motor Strength 5/5 B/L upper and lower extremities;   DTRs 2+ intact and symmetric, sensation intact BL

## 2024-03-20 NOTE — PROGRESS NOTE ADULT - ASSESSMENT
A/P-  78-year-old female with history of hypertension, small cell carcinoma of the lung, COPD, anxiety, paroxysmal A-fib on Xarelto, CML, right cerebellar mass, status post brain mass removal and 2/29/2024 at [facility] presented on 3/15 accompanied with her daughter complaining of fever which started couple days prior to admission  had Tmax of 101 at home, they did call [facility] and were seen for their postop visit.  Patient's wound was evaluated told everything was normal and had blood test done.  They were told everything was normal and discharged home, patient continued to have fever and given Tylenol every 6 hours.  3/15/2024 patient had Tmax of 102, patient started attempt to call the neurosurgeon again today, were told to go to an urgent care was sent to the ER for further workup.        low grade temps yesterday, none today  + leukocytosis 15.28  UA- negative  blood cx from admission 3/15- NGTD x 2  BCs 3/18 NGTD x 2  Cr ok    MRI of the neck reported as -  ostoperative changes compatible with a right suboccipital craniectomy   meshlike cranioplasty is identified. There is evidence of T1 shortening   identified in the posterior region which could be compatible areas of   hemorrhage and or posterior material. There is also adjacent areas of   enhancement seen in this region (9-41) this could be compatible with   postop changes though possibly of residual tumor cannot be entirely   excluded. Clinical correlation and continued close interval follow-up is   recommended.  Extra axial collection in the postop region is identified which likely   related to postop changes. This finding measures approximate 1 cm in   widest diameter. Small extradural collection is seen in the postop region   which measures approximately 2.8 mm in widest diameter. This could be   compatible with a tiny pseudomeningocele. Adjacent area of edema seen as   well.    repeat RVP from 3/18- negative  LE US- reported negative for DVT    CT chest from 3/19  - Findings suggestive of postradiation therapy changes in the right parahilar location. Appearance is unchanged when compared to previous exam.    plan-  continue monitoring off abx  follow all culture data   trend pt's temperatures and wbc  MRI of the neck as per report most likely post-op changes and or ? small hematoma around occipital surgical site    will discuss with Dr. Nova A/P-  78-year-old female with history of hypertension, small cell carcinoma of the lung, COPD, anxiety, paroxysmal A-fib on Xarelto, CML, right cerebellar mass, status post brain mass removal and 2/29/2024 at [facility] presented on 3/15 accompanied with her daughter complaining of fever which started couple days prior to admission  had Tmax of 101 at home, they did call [facility] and were seen for their postop visit.  Patient's wound was evaluated told everything was normal and had blood test done.  They were told everything was normal and discharged home, patient continued to have fever and given Tylenol every 6 hours.  3/15/2024 patient had Tmax of 102, patient started attempt to call the neurosurgeon again today, were told to go to an urgent care was sent to the ER for further workup.        low grade temps yesterday, none today  + leukocytosis 15.28  UA- negative  blood cx from admission 3/15- NGTD x 2  BCs 3/18 NGTD x 2  Cr ok    MRI of the neck reported as -  postoperative changes compatible with a right suboccipital craniectomy   meshlike cranioplasty is identified. There is evidence of T1 shortening   identified in the posterior region which could be compatible areas of   hemorrhage and or posterior material. There is also adjacent areas of   enhancement seen in this region (9-41) this could be compatible with   postop changes though possibly of residual tumor cannot be entirely   excluded. Clinical correlation and continued close interval follow-up is   recommended.  Extra axial collection in the postop region is identified which likely   related to postop changes. This finding measures approximate 1 cm in   widest diameter. Small extradural collection is seen in the postop region   which measures approximately 2.8 mm in widest diameter. This could be   compatible with a tiny pseudomeningocele. Adjacent area of edema seen as   well.    repeat RVP from 3/18- negative  LE US- reported negative for DVT    CT chest from 3/19  - Findings suggestive of postradiation therapy changes in the right parahilar location. Appearance is unchanged when compared to previous exam.  workup thus far negative    plan-  continue monitoring off abx  follow all culture data   trend pt's temperatures and wbc  MRI of the neck as per report most likely post-op changes and or ? small hematoma around occipital surgical site    will discuss with Dr. Nova

## 2024-03-20 NOTE — DISCHARGE NOTE PROVIDER - HOSPITAL COURSE
78-year-old female with history of hypertension, small cell carcinoma of the lung, COPD, anxiety, paroxysmal A-fib on Xarelto, CML, right cerebellar mass, status post brain mass removal and 2/29/2024 at [facility] presents today accompanied with her daughter complaining of fever. Admit for post op fever unknown source.     ##Postsurgical fever  #Neurogenic fever?   ·  recent neurosurgery at [facility] for right cerebellar mass in 2/29/202 present with fever. CT head reviewed concerning for developing abscess. follow up MRI of head and neck reviewed - no abscess. Postoperative changes compatible with a right suboccipital craniectomy meshlike cranioplasty is identified. There is evidence of T1 shortening identified in the posterior region which could be compatible areas of hemorrhage and or posterior material. There is also adjacent areas of enhancement seen in this region (9-41) this could be compatible with postop changes though possibly of residual tumor cannot be entirely excluded. Continue to have fevers. last one 100.6F on tylenol, uptrending leukocytosis. RVP negative. 2 sets of blood cultures negative. Bilateral lower extremity doppler negative for DVT. Infectious workup negative. ID evaluated patient.   - Reached out to NSG Dr. Sidney Bowman who performed R cerebellar mass removal. [place] but awaiting call back from office. Patient will follow up.     #COPD (chronic obstructive pulmonary disease).   ·  [facility].     # Hypertension.   ·  Stable, C/w Norvasc and Valsartan     #Paroxysmal atrial fibrillation.   - C/w BB  ·   per pt to resume Ac on 3/14. Given Postop changes will hold off on AC til she follow up with her NSG.    Stable for discharge with close followup with PCP and NSG. Patient and daughter in agreement with this plan.    78-year-old female with history of hypertension, small cell carcinoma of the lung, COPD, anxiety, paroxysmal A-fib on Xarelto, CML, right cerebellar mass, status post brain mass removal and 2/29/2024 at [facility] presents today accompanied with her daughter complaining of fever. Admit for post op fever unknown source.     ##Postsurgical fever  #Neurogenic fever?   ·  recent neurosurgery at [facility] for right cerebellar mass in 2/29/202 present with fever. CT head reviewed concerning for developing abscess. follow up MRI of head and neck reviewed - no abscess. Postoperative changes compatible with a right suboccipital craniectomy meshlike cranioplasty is identified. There is evidence of T1 shortening identified in the posterior region which could be compatible areas of hemorrhage and or posterior material. There is also adjacent areas of enhancement seen in this region (9-41) this could be compatible with postop changes though possibly of residual tumor cannot be entirely excluded. Continue to have fevers. last one 100.6F on tylenol, uptrending leukocytosis. RVP negative. 2 sets of blood cultures negative. Bilateral lower extremity doppler negative for DVT. Infectious workup negative. ID evaluated patient. she also has CML and showed me her outpatient portal where her baseline wbc is in the 14-15 range   - Reached out to NSG Dr. Sidney Bowman who performed R cerebellar mass removal. He is [place] of [place] but awaiting call back from office. Patient will follow up.     #COPD (chronic obstructive pulmonary disease).   ·  Patient brought in home trelogy. Continue at discharge.     # Hypertension.   ·  Stable, C/w Norvasc and Valsartan     #Paroxysmal atrial fibrillation.   - C/w BB  ·   per pt to resume Ac on 3/14. Given Postop changes will hold off on AC til she follow up with her NSG.    Stable for discharge with close followup with PCP and NSG. Patient and daughter in agreement with this plan.

## 2024-03-20 NOTE — DISCHARGE NOTE NURSING/CASE MANAGEMENT/SOCIAL WORK - NSDCPEFALRISK_GEN_ALL_CORE
For information on Fall & Injury Prevention, visit: [email or web address] OR  [email or web address] OR  https://www.cdc.gov/steadi/patient.html

## 2024-03-20 NOTE — DISCHARGE NOTE PROVIDER - NSDCFUADDAPPT_GEN_ALL_CORE_FT
APPTS ARE READY TO BE MADE: [ x] YES    Best Family or Patient Contact (if needed):    Additional Information about above appointments (if needed):    1:   2:   3:     Other comments or requests:    APPTS ARE READY TO BE MADE: [ x] YES    Best Family or Patient Contact (if needed):    Additional Information about above appointments (if needed):    1:   2:   3:     Other comments or requests:     Provided patient with provider referral information, however patient prefers to schedule the appointments on their own.

## 2024-03-20 NOTE — DISCHARGE NOTE PROVIDER - NSDCMRMEDTOKEN_GEN_ALL_CORE_FT
albuterol 90 mcg/inh inhalation aerosol: 2 puff(s) inhaled every 6 hours As needed Shortness of Breath and/or Wheezing  amLODIPine 2.5 mg oral tablet: 1 tab(s) orally once a day  atenolol 50 mg oral tablet: 1 tab(s) orally once a day  clonazePAM 0.25 mg oral tablet, disintegrating: orally 2 times a day, As Needed  PROzac 10 mg oral capsule: 1 cap(s) orally once a day  Trelegy Ellipta 100 mcg-62.5 mcg-25 mcg/inh inhalation powder: 1 puff(s) inhaled once a day  valsartan 160 mg oral tablet: 1 tab(s) orally once a day

## 2024-03-21 LAB
CULTURE RESULTS: SIGNIFICANT CHANGE UP
CULTURE RESULTS: SIGNIFICANT CHANGE UP
SPECIMEN SOURCE: SIGNIFICANT CHANGE UP
SPECIMEN SOURCE: SIGNIFICANT CHANGE UP
T3 SERPL-MCNC: 121 NG/DL — SIGNIFICANT CHANGE UP (ref 80–200)
T4 AB SER-ACNC: 9.3 UG/DL — SIGNIFICANT CHANGE UP (ref 4.6–12)

## 2024-03-24 LAB
CULTURE RESULTS: SIGNIFICANT CHANGE UP
CULTURE RESULTS: SIGNIFICANT CHANGE UP
SPECIMEN SOURCE: SIGNIFICANT CHANGE UP
SPECIMEN SOURCE: SIGNIFICANT CHANGE UP

## 2024-03-26 DIAGNOSIS — J44.9 CHRONIC OBSTRUCTIVE PULMONARY DISEASE, UNSPECIFIED: ICD-10-CM

## 2024-03-26 DIAGNOSIS — R50.82 POSTPROCEDURAL FEVER: ICD-10-CM

## 2024-03-26 DIAGNOSIS — E87.6 HYPOKALEMIA: ICD-10-CM

## 2024-03-26 DIAGNOSIS — C34.90 MALIGNANT NEOPLASM OF UNSPECIFIED PART OF UNSPECIFIED BRONCHUS OR LUNG: ICD-10-CM

## 2024-03-26 DIAGNOSIS — T81.89XA OTHER COMPLICATIONS OF PROCEDURES, NOT ELSEWHERE CLASSIFIED, INITIAL ENCOUNTER: ICD-10-CM

## 2024-03-26 DIAGNOSIS — I48.0 PAROXYSMAL ATRIAL FIBRILLATION: ICD-10-CM
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
## 2024-03-26 DIAGNOSIS — I10 ESSENTIAL (PRIMARY) HYPERTENSION: ICD-10-CM

## 2024-03-26 DIAGNOSIS — Z90.710 ACQUIRED ABSENCE OF BOTH CERVIX AND UTERUS: ICD-10-CM

## 2024-03-26 DIAGNOSIS — F41.9 ANXIETY DISORDER, UNSPECIFIED: ICD-10-CM

## 2024-03-26 DIAGNOSIS — C92.10 CHRONIC MYELOID LEUKEMIA, BCR/ABL-POSITIVE, NOT HAVING ACHIEVED REMISSION: ICD-10-CM
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
## 2024-04-03 RX ORDER — VALSARTAN 160 MG/1
160 TABLET, COATED ORAL
Qty: 90 | Refills: 3 | Status: ACTIVE | COMMUNITY
Start: 2023-03-19 | End: 1900-01-01

## 2024-04-22 ENCOUNTER — NON-APPOINTMENT (OUTPATIENT)
Age: 79
End: 2024-04-22

## 2024-04-24 ENCOUNTER — RX RENEWAL (OUTPATIENT)
Age: 79
End: 2024-04-24

## 2024-04-24 RX ORDER — ATENOLOL 50 MG/1
50 TABLET ORAL
Qty: 90 | Refills: 1 | Status: ACTIVE | COMMUNITY
Start: 2021-07-08 | End: 1900-01-01

## 2024-05-20 ENCOUNTER — APPOINTMENT (OUTPATIENT)
Dept: CARDIOLOGY | Facility: CLINIC | Age: 79
End: 2024-05-20
Payer: MEDICARE

## 2024-05-20 VITALS
TEMPERATURE: 98.4 F | HEART RATE: 67 BPM | DIASTOLIC BLOOD PRESSURE: 62 MMHG | HEIGHT: 61.4 IN | WEIGHT: 130.07 LBS | BODY MASS INDEX: 24.24 KG/M2 | SYSTOLIC BLOOD PRESSURE: 124 MMHG | OXYGEN SATURATION: 95 %

## 2024-05-20 DIAGNOSIS — I70.90 UNSPECIFIED ATHEROSCLEROSIS: ICD-10-CM

## 2024-05-20 DIAGNOSIS — C34.11 MALIGNANT NEOPLASM OF UPPER LOBE, RIGHT BRONCHUS OR LUNG: ICD-10-CM

## 2024-05-20 DIAGNOSIS — I48.0 PAROXYSMAL ATRIAL FIBRILLATION: ICD-10-CM

## 2024-05-20 DIAGNOSIS — Z87.898 PERSONAL HISTORY OF OTHER SPECIFIED CONDITIONS: ICD-10-CM

## 2024-05-20 DIAGNOSIS — Z01.810 ENCOUNTER FOR PREPROCEDURAL CARDIOVASCULAR EXAMINATION: ICD-10-CM

## 2024-05-20 DIAGNOSIS — C92.10 CHRONIC MYELOID LEUKEMIA, BCR/ABL-POSITIVE, NOT HAVING ACHIEVED REMISSION: ICD-10-CM

## 2024-05-20 DIAGNOSIS — I10 ESSENTIAL (PRIMARY) HYPERTENSION: ICD-10-CM

## 2024-05-20 DIAGNOSIS — J96.01 ACUTE RESPIRATORY FAILURE WITH HYPOXIA: ICD-10-CM

## 2024-05-20 DIAGNOSIS — E78.5 HYPERLIPIDEMIA, UNSPECIFIED: ICD-10-CM

## 2024-05-20 PROCEDURE — 99215 OFFICE O/P EST HI 40 MIN: CPT

## 2024-05-20 PROCEDURE — 93000 ELECTROCARDIOGRAM COMPLETE: CPT

## 2024-05-20 PROCEDURE — G2211 COMPLEX E/M VISIT ADD ON: CPT

## 2024-05-20 NOTE — ASSESSMENT
[FreeTextEntry1] : -------------- 78 year old woman with ASCVD risk factors, atherosclerotic calcifications on CT scan, CML and stage IV NSCLC s/p chemotherapy/radiation and consolidation immunotherapy with durvalumab. Now resuming treatment for CML with bosutinib. 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  # Paroxysmal AF: Patch monitoring done for irregular rhythm disclosed paroxysmal atrial fibrillation, mostly at night, rates controlled. - resume rivaroxaban 20 mg daily now - hold as needed for procedures, hold 3 days prior to tooth extraction/dental work - hold for platelet < 75 or bleeding - a follow up brain MRI is scheduled at [facility] this week to monitor  # Hypertension: ECG suggestive of long-standing hypertension. And echo showed normal LVEF and strain with concentric LVH. Controlled. - continue valsartan 160, atenolol 50, amlodipine 5 mg daily  # Atherosclerotic calcifications, CML, lung cancer. JAK2 negative. - Lipid panel 2/15/2023: LDL 65, non-HDL 83, HDL 88, total 171, after statin initiated - cont rosuvastatin 5 mg daily  Will obtain a follow up echocardiogram in August or September ~ one year from last study to monitor LV function on bosutinib given the patient's complex condition as noted above.  Above recommendations were discussed with the patient and all questions answered to the best of my ability and to her apparent satisfaction.  Follow up in 4 months with me

## 2024-05-20 NOTE — REVIEW OF SYSTEMS
[Diarrhea] : diarrhea [Negative] : Heme/Lymph [Dyspnea on exertion] : not dyspnea during exertion [Palpitations] : no palpitations [Dizziness] : dizziness [FreeTextEntry8] : increased urination [FreeTextEntry1] : gait imbalance

## 2024-05-20 NOTE — REASON FOR VISIT
[Other: _____] : [unfilled] [FreeTextEntry3] : [facility] (Dr. Leon) [FreeTextEntry1] : ------------------------------------------------------------------------ KALA VALLEJO is a 78 year old woman with a history of COPD diagnosed with concurrent CML and non-small cell lung carcinoma in 2022 who is seen for follow up of paroxysmal atrial fibrillation, HTN, and HLD.  Prior Cancer Treatments: ------------------------------------------------------------------------ Chemo/targeted therapy: 12/2022-2/2023: carbo/paclitaxel 3/2023-9/27/2023: durvalumab x8 5/2023-10/2023: hydroxyurea 12/7/2023-1/2023: bosutinib 400 mg daily 4/2024: bosutinib resumed (reduced dose) ------------------------------------------------------------------------ Surgery: 2/29/2024: resection of right cerebellar lesion ------------------------------------------------------------------------ Radiation: 12/2022-2/2022: 35 fractions to lung 4/8/2024-4/11/2024: to brain

## 2024-05-20 NOTE — HISTORY OF PRESENT ILLNESS
[FreeTextEntry1] : Interval History: Pat had an eventful few months. In February, she was found to have CNS metastasis of the lung cancer on scans performed due to gait imbalance. She underwent neurosurgical resection on 2024 at [facility] with Dr. Sidney Bowman. She subsequently received radiation. She was admitted to [facility] for fever and treated for pneumonia. They recommended she hold rivaroxaban given the CNS metastasis. She reports feeling overall pretty good. She resumed bosutinib in May at reduced dose. No evidence of hepatic toxicity thus far. The plan is to increase incrementally to 300 mg daily. No molecular data, but Lymphocyte count has improved with CML treatment. BP is controlled. She is back on rosuvastatin with normal ALT/AST on labs from [facility] this week.  History: This female smoker with a history of hypertension for many years found to have CML after routine blood tests done by her PCP. During workup for CML, imaging of the chest showed a 6.4 cm partially calcified right hilar mass in the right upper lobe.  Takes atenolol 50, amlodipine, and valsartan/HCTZ for hypertension, but at times BP uncontrolled.  2022: Biopsy of lung mass consistent with NSCL adenocarcinoma. She chose to go to [facility] for a second opinion and will begin chemo/radiation in a few weeks. Plan for 7 weeks of radiation 35 fractions total (5 days per week for 7 weeks) along with 7 weekly cycles of chemotherapy to be followed by immune-checkpoint inhibitor. CML to be managed conservatively with observation pending treatment of lung cancer.  Started apixaban and notes bleeding from lip, but able to achieve hemostasis with pressure and denies significant blood loss. Occasional palpitations when arguing with Ariana. No chest pain. No edema, orthopnea.  2023: Has difficulty affording apixaban, Xarelto preferred (tier 3). Feels well. No palpitations. No chest pain, edema. Completed carbo/taxol. BPs have been generally controlled on current regimen. To begin durvalumab for 6-12 months after follow up CT.  May 15, 2023: Admitted to the hospital in February for PNA following radiation and some cardiac medication adjustments were made. Since discharge, she has gone back on previous regimen.  Started on durvalumab 3/16 and s/p 3 cycles thus far, without side effects or apparent toxicity. Because of rising platelets, hydroxyurea was started for CML a few weeks ago. She is unsure about whether she should have TKI therapy, and current plan is to delay this, if possible, until further along in the course of immunotherapy. She remains on Xarelto. Feels good overall.  2023: Feels OK. Remains on immunotherapy, tolerating well with plans to complete one year. CML is being managed with hydroxyurea and serial monitoring of the blood count. She denies palpitations. She is on Trelegy for COPD, which has helped breathing. ECG today new inferior Q wave  2024: She completed maintenance durvalumab in September. She began bosutinib for CML on 23 and developed ALT/AST elevations, (ALT > 1000). Bosutinib was held, and she was advised to stop rosuvastatin as well. On follow up assessment , AST decreased to 327 and ALT to 623.  She feels well. She did have some GI side effects, diarrhea, but these since subsided. In this setting had recurrent AF but the rate was normal.   Cardiovascular Summary: ---------------------------------------------- EC2024: NSR 66 bpm, possible lateral infarct (no change) 2024: NSR, LAD, possible anterolateral infarct (no change) 2023: NSR 74 bpm, LAD, possible lateral infarct, possible inferior infarct vs LAFB 5/15/2023: NSR 68 bpm, possible lateral infarct, no change from prior 2023: NSR, possible lateral infarct, possible inferior infarct. No change from prior. 2022: NSR 60 bpm, possible lateral infarct, possible inferior infarct. No significant change. 10/14/2022: NSR, possible inferior infarct, possible anterior infarct. 9/15/2022: NSR, non-specific IVCD - similar to 10/14/2022 ---------------------------------------------- Echo: 2023: EF 65 %, GLS -19 10/20/2022: EF 65 %, GLS -22.6 % ---------------------------------------------- CT/MRI 2022: coronary/aortic calcifications, 6.4 cm right hilar mass as above ---------------------------------------------- Remote/ambulatory rhythm monitorin2022: Zio patch - atrial fibrillation

## 2024-06-17 ENCOUNTER — TRANSCRIPTION ENCOUNTER (OUTPATIENT)
Age: 79
End: 2024-06-17

## 2024-06-17 RX ORDER — AMLODIPINE BESYLATE 5 MG/1
5 TABLET ORAL
Qty: 90 | Refills: 3 | Status: ACTIVE | COMMUNITY
Start: 2021-07-08 | End: 1900-01-01

## 2024-06-27 ENCOUNTER — TRANSCRIPTION ENCOUNTER (OUTPATIENT)
Age: 79
End: 2024-06-27

## 2024-08-14 ENCOUNTER — TRANSCRIPTION ENCOUNTER (OUTPATIENT)
Age: 79
End: 2024-08-14

## 2024-08-27 ENCOUNTER — APPOINTMENT (OUTPATIENT)
Dept: CARDIOLOGY | Facility: HOSPITAL | Age: 79
End: 2024-08-27
Payer: MEDICARE

## 2024-08-27 DIAGNOSIS — I70.90 UNSPECIFIED ATHEROSCLEROSIS: ICD-10-CM

## 2024-08-27 DIAGNOSIS — I48.0 PAROXYSMAL ATRIAL FIBRILLATION: ICD-10-CM

## 2024-08-27 DIAGNOSIS — I10 ESSENTIAL (PRIMARY) HYPERTENSION: ICD-10-CM

## 2024-08-27 DIAGNOSIS — C34.11 MALIGNANT NEOPLASM OF UPPER LOBE, RIGHT BRONCHUS OR LUNG: ICD-10-CM

## 2024-08-27 DIAGNOSIS — C92.10 CHRONIC MYELOID LEUKEMIA, BCR/ABL-POSITIVE, NOT HAVING ACHIEVED REMISSION: ICD-10-CM

## 2024-08-27 PROCEDURE — 99214 OFFICE O/P EST MOD 30 MIN: CPT

## 2024-08-27 NOTE — REASON FOR VISIT
[FreeTextEntry3] : [facility] (Dr. Leon) [FreeTextEntry1] : ------------------------------------------------------------------------ KALA VALLEJO is a 78 year old woman with a history of COPD, HTN, HLD, diagnosed with concurrent CML and non-small cell lung carcinoma in 2022 who is seen for follow up of paroxysmal atrial fibrillation and cardiovascular risk factors.  Prior Cancer Treatments: ------------------------------------------------------------------------ Chemo/targeted therapy: 12/2022-2/2023: carbo/paclitaxel 3/2023-9/27/2023: durvalumab x8 5/2023-10/2023: hydroxyurea 12/7/2023-1/2023: bosutinib 400 mg daily 4/2024: bosutinib resumed (300 mg daily) ------------------------------------------------------------------------ Surgery: 2/29/2024: resection of right cerebellar lesion ------------------------------------------------------------------------ Radiation: 12/2022-2/2022: 35 fractions to lung 4/8/2024-4/11/2024: to brain

## 2024-08-27 NOTE — REVIEW OF SYSTEMS
[Dizziness] : dizziness [Negative] : Constitutional [Dyspnea on exertion] : not dyspnea during exertion [Palpitations] : no palpitations [Cough] : cough [Dysphagia] : dysphagia [Depression] : no depression [Easy Bleeding] : no tendency for easy bleeding [Easy Bruising] : no tendency for easy bruising [FreeTextEntry1] : gait imbalance

## 2024-08-27 NOTE — ASSESSMENT
[FreeTextEntry1] : ------------------------------- 78 year old woman with ASCVD risk factors, atherosclerotic calcifications on CT scan, CML and stage IV NSCLC s/p chemotherapy/radiation and consolidation immunotherapy with durvalumab. Now on treatment for CML with bosutinib (dose reduced). 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 Now with POD in CNS requiring additional radiation.   # Paroxysmal AF: Patch monitoring done for irregular rhythm disclosed paroxysmal atrial fibrillation, mostly at night, rates controlled. - continue rivaroxaban 20 mg daily, can continue during CNS radiation with monitoring - hold as needed for procedures, hold 3 days prior to tooth extraction/dental work - hold for platelet < 75 or bleeding  # Hypertension: ECG suggestive of long-standing hypertension. And echo showed normal LVEF and strain with concentric LVH. Controlled. - continue valsartan 160, atenolol 50, amlodipine 5 mg daily - control BP during CNS radiation given anticoagulation  # Atherosclerotic calcifications, CML, lung cancer. JAK2 negative. - Lipid panel 2/15/2023: LDL 65, non-HDL 83, HDL 88, total 171, after statin initiated - cont rosuvastatin 5 mg daily - continue Xarelto given use of bosutinib  # Cough - may be related to TKI?, will discuss with her oncologist given dysphagia with liquids.  Above recommendations were discussed with the patient and all questions answered to the best of my ability and to her apparent satisfaction.  Follow up in 4 months with me

## 2024-08-27 NOTE — END OF VISIT
[Time Spent: ___ minutes] : I have spent [unfilled] minutes of time on the encounter which excludes teaching and separately reported services.
98

## 2024-08-27 NOTE — HISTORY OF PRESENT ILLNESS
[FreeTextEntry1] : This visit was conducted using real-time two way audio and video technology. The patient, KALA VALLEJO, was located at [address] [place] [address] at the time of the visit. The provider, Antonio Horan MD was located at the medical office in [place], [place]. Verbal consent given on 2024 at 10:51 by KALA VALLEJO.  Interval History: Seen for follow up today to discuss anticoagulation management in view of new CNS disease progression and need for radiotherapy.  Follow up imaging showed interval increase in CNS metastases with new lesions and interval increase in size of known lesions. Additional radiation therapy is planned. Most recent CT showed stable disease in the lungs and no other sites of disease progression. She feels OK and reports not having new or concerning symptoms. She reports persistent dry cough, but no further hemoptysis. She also notes difficulty swallowing liquids, but not solid food.  She states BP is controlled on current regimen. She is currently taking rivaroxaban. She remains on bosutinib (reduced dose) with sustained molecular response per her hematologist at [facility].   History: This female smoker with a history of hypertension for many years found to have CML after routine blood tests done by her PCP. During workup for CML, imaging of the chest showed a 6.4 cm partially calcified right hilar mass in the right upper lobe.  Takes atenolol 50, amlodipine, and valsartan/HCTZ for hypertension, but at times BP uncontrolled.  2022: Biopsy of lung mass consistent with NSCL adenocarcinoma. She chose to go to [facility] for a second opinion and will begin chemo/radiation in a few weeks. Plan for 7 weeks of radiation 35 fractions total (5 days per week for 7 weeks) along with 7 weekly cycles of chemotherapy to be followed by immune-checkpoint inhibitor. CML to be managed conservatively with observation pending treatment of lung cancer.  Started apixaban and notes bleeding from lip, but able to achieve hemostasis with pressure and denies significant blood loss. Occasional palpitations when arguing with Ariana. No chest pain. No edema, orthopnea.  2023: Has difficulty affording apixaban, Xarelto preferred (tier 3). Feels well. No palpitations. No chest pain, edema. Completed carbo/taxol. BPs have been generally controlled on current regimen. To begin durvalumab for 6-12 months after follow up CT.  May 15, 2023: Admitted to the hospital in February for PNA following radiation and some cardiac medication adjustments were made. Since discharge, she has gone back on previous regimen.  Started on durvalumab 3/ and s/p 3 cycles thus far, without side effects or apparent toxicity. Because of rising platelets, hydroxyurea was started for CML a few weeks ago. She is unsure about whether she should have TKI therapy, and current plan is to delay this, if possible, until further along in the course of immunotherapy. She remains on Xarelto. Feels good overall.  2023: Feels OK. Remains on immunotherapy, tolerating well with plans to complete one year. CML is being managed with hydroxyurea and serial monitoring of the blood count. She denies palpitations. She is on Trelegy for COPD, which has helped breathing. ECG today new inferior Q wave  2024: She completed maintenance durvalumab in September. She began bosutinib for CML on 23 and developed ALT/AST elevations, (ALT > 1000). Bosutinib was held, and she was advised to stop rosuvastatin as well. On follow up assessment , AST decreased to 327 and ALT to 623.  She feels well. She did have some GI side effects, diarrhea, but these since subsided. In this setting had recurrent AF but the rate was normal.  2024: Pat had an eventful few months. In February, she was found to have CNS metastasis of the lung cancer on scans performed due to gait imbalance. She underwent neurosurgical resection on 2024 at [facility] with Dr. Sidney oBwman. She subsequently received radiation. She was admitted to [facility] for fever and treated for pneumonia. They recommended she hold rivaroxaban given the CNS metastasis. She reports feeling overall pretty good. She resumed bosutinib in May at reduced dose. No evidence of hepatic toxicity thus far. The plan is to increase incrementally to 300 mg daily. No molecular data, but Lymphocyte count has improved with CML treatment. BP is controlled. She is back on rosuvastatin with normal ALT/AST on labs from [facility] this week.   Cardiovascular Summary: ---------------------------------------------- EC2024: NSR 66 bpm, possible lateral infarct (no change) 2024: NSR, LAD, possible anterolateral infarct (no change) 2023: NSR 74 bpm, LAD, possible lateral infarct, possible inferior infarct vs LAFB 5/15/2023: NSR 68 bpm, possible lateral infarct, no change from prior 2023: NSR, possible lateral infarct, possible inferior infarct. No change from prior. 2022: NSR 60 bpm, possible lateral infarct, possible inferior infarct. No significant change. 10/14/2022: NSR, possible inferior infarct, possible anterior infarct. 9/15/2022: NSR, non-specific IVCD - similar to 10/14/2022 ---------------------------------------------- Echo: 2023: EF 65 %, GLS -19 10/20/2022: EF 65 %, GLS -22.6 % ---------------------------------------------- CT/MRI 2022: coronary/aortic calcifications, 6.4 cm right hilar mass as above ---------------------------------------------- Remote/ambulatory rhythm monitorin2022: Zio patch - atrial fibrillation

## 2025-05-29 NOTE — H&P ADULT - PROBLEM/PLAN-7
Karina Rabago came to the [facility], [address], for a fibroscan. I explained the procedure in detail and patient verbalized understanding. Patient confirmed being NPO since midnight Allergies reviewed and documented.Procedure started at 10:27AM and completed at 10:42AM.  Patient tolerated procedure well and was discharged home.   
DISPLAY PLAN FREE TEXT

## (undated) DEVICE — DRSG TAPE TRANSPORE 1"

## (undated) DEVICE — VALVE BIOPSY BRONCHOVIDEOSCOPE

## (undated) DEVICE — GLV 7 PROTEXIS (WHITE)

## (undated) DEVICE — SOL IRR NS 0.9% 250ML

## (undated) DEVICE — WARMING BLANKET LOWER ADULT

## (undated) DEVICE — DRAPE TOWEL BLUE 17" X 24"

## (undated) DEVICE — PROBE FIAPC APC 4.5FR 1.5MM 150CM

## (undated) DEVICE — SOL ANTI FOG

## (undated) DEVICE — LABELS BLANK W PEN

## (undated) DEVICE — SOL IRR POUR NS 0.9% 500ML

## (undated) DEVICE — SYR LUER SLIP TIP 30CC

## (undated) DEVICE — TRAP SPECIMEN SPUTUM 40CC

## (undated) DEVICE — FORCEP BIOPSY BRONCHOSCOPE DISP

## (undated) DEVICE — SYR LUER LOK 10CC

## (undated) DEVICE — BIOPSY FORCEP OLYMPUS ALLIGATOR 2.0MM

## (undated) DEVICE — SOL INJ NS 0.9% 100ML

## (undated) DEVICE — SOL IRR POUR H2O 500ML

## (undated) DEVICE — VALVE SUCTION EVIS 160/200/240

## (undated) DEVICE — DRAPE 3/4 SHEET 52X76"

## (undated) DEVICE — PACK BRONCHOSCOPY

## (undated) DEVICE — DRSG CURITY GAUZE SPONGE 4 X 4" 12-PLY

## (undated) DEVICE — LIJ/LIA-ESU ERBE APC2 062977: Type: DURABLE MEDICAL EQUIPMENT

## (undated) DEVICE — STOPCOCK 4-WAY (BLUE) DISCOFIX SPIN-LOCK CONNECTOR

## (undated) DEVICE — SYR LUER LOK 20CC

## (undated) DEVICE — GLV 8.5 PROTEXIS (WHITE)

## (undated) DEVICE — NDL ASPIRATION VIZISHOT2 22G

## (undated) DEVICE — VENODYNE/SCD SLEEVE CALF MEDIUM

## (undated) DEVICE — ADAPTER FIBEROPTIC BRONCHOSCOPE DUAL AXIS SWIVEL

## (undated) DEVICE — TUBING CANNULA SALTER LABS NASAL ADULT 7FT

## (undated) DEVICE — NDL ASPIRATION VIZISHOT2 21G

## (undated) DEVICE — FORCEP RADIAL JAW 4 HOT BIOPSY DISP

## (undated) DEVICE — SUTURE REMOVAL KIT

## (undated) DEVICE — PLATE NESSY 170

## (undated) DEVICE — BRUSH CYTO DISP

## (undated) DEVICE — BALLOON SINGLE FOR BF-UC160F

## (undated) DEVICE — FORCEP BIOPSY 1.8MM JAW X 100CM DISP

## (undated) DEVICE — MASK SURGICAL WITH EYESHIELD ANTIFOG (ORANGE)